# Patient Record
Sex: FEMALE | Race: OTHER | Employment: FULL TIME | URBAN - METROPOLITAN AREA
[De-identification: names, ages, dates, MRNs, and addresses within clinical notes are randomized per-mention and may not be internally consistent; named-entity substitution may affect disease eponyms.]

---

## 2017-01-05 ENCOUNTER — TELEPHONE (OUTPATIENT)
Dept: FAMILY MEDICINE CLINIC | Facility: CLINIC | Age: 52
End: 2017-01-05

## 2017-01-05 RX ORDER — ATORVASTATIN CALCIUM 20 MG/1
20 TABLET, FILM COATED ORAL NIGHTLY
Qty: 30 TABLET | Refills: 2 | Status: SHIPPED | OUTPATIENT
Start: 2017-01-05 | End: 2017-02-13

## 2017-02-13 ENCOUNTER — OFFICE VISIT (OUTPATIENT)
Dept: INTERNAL MEDICINE CLINIC | Facility: CLINIC | Age: 52
End: 2017-02-13

## 2017-02-13 VITALS
BODY MASS INDEX: 28.52 KG/M2 | DIASTOLIC BLOOD PRESSURE: 86 MMHG | HEART RATE: 70 BPM | WEIGHT: 155 LBS | SYSTOLIC BLOOD PRESSURE: 132 MMHG | OXYGEN SATURATION: 98 % | HEIGHT: 62 IN

## 2017-02-13 DIAGNOSIS — E78.2 MIXED HYPERLIPIDEMIA: ICD-10-CM

## 2017-02-13 DIAGNOSIS — Z00.00 ANNUAL PHYSICAL EXAM: Primary | ICD-10-CM

## 2017-02-13 DIAGNOSIS — K21.9 GASTROESOPHAGEAL REFLUX DISEASE, ESOPHAGITIS PRESENCE NOT SPECIFIED: ICD-10-CM

## 2017-02-13 DIAGNOSIS — Z12.39 SCREENING FOR BREAST CANCER: ICD-10-CM

## 2017-02-13 DIAGNOSIS — G43.109 MIGRAINE WITH AURA AND WITHOUT STATUS MIGRAINOSUS, NOT INTRACTABLE: ICD-10-CM

## 2017-02-13 DIAGNOSIS — Z12.11 COLON CANCER SCREENING: ICD-10-CM

## 2017-02-13 DIAGNOSIS — Z78.0 POST-MENOPAUSAL: ICD-10-CM

## 2017-02-13 PROCEDURE — 36415 COLL VENOUS BLD VENIPUNCTURE: CPT | Performed by: FAMILY MEDICINE

## 2017-02-13 PROCEDURE — 80061 LIPID PANEL: CPT | Performed by: FAMILY MEDICINE

## 2017-02-13 PROCEDURE — 99396 PREV VISIT EST AGE 40-64: CPT | Performed by: FAMILY MEDICINE

## 2017-02-13 PROCEDURE — 80053 COMPREHEN METABOLIC PANEL: CPT | Performed by: FAMILY MEDICINE

## 2017-02-13 RX ORDER — ALENDRONATE SODIUM 70 MG/1
70 TABLET ORAL WEEKLY
Qty: 12 TABLET | Refills: 3 | Status: SHIPPED | OUTPATIENT
Start: 2017-02-13 | End: 2018-03-12

## 2017-02-13 RX ORDER — LANSOPRAZOLE 30 MG/1
30 CAPSULE, DELAYED RELEASE ORAL DAILY PRN
Qty: 90 CAPSULE | Refills: 0 | Status: SHIPPED | OUTPATIENT
Start: 2017-02-13 | End: 2018-02-08

## 2017-02-13 RX ORDER — SUMATRIPTAN 100 MG/1
100 TABLET, FILM COATED ORAL EVERY 2 HOUR PRN
Qty: 9 TABLET | Refills: 5 | Status: SHIPPED | OUTPATIENT
Start: 2017-02-13 | End: 2018-02-10

## 2017-02-13 RX ORDER — AMITRIPTYLINE HYDROCHLORIDE 10 MG/1
10 TABLET, FILM COATED ORAL NIGHTLY
Qty: 90 TABLET | Refills: 3 | Status: SHIPPED | OUTPATIENT
Start: 2017-02-13 | End: 2018-01-19

## 2017-02-13 RX ORDER — ATORVASTATIN CALCIUM 20 MG/1
20 TABLET, FILM COATED ORAL NIGHTLY
Qty: 90 TABLET | Refills: 3 | Status: SHIPPED | OUTPATIENT
Start: 2017-02-13 | End: 2017-03-20

## 2017-02-13 NOTE — PROGRESS NOTES
HPI:   Isha Bhatti is a 45year old female who presents for a complete physical exam & BECOMING ESTABLISHED  Last pap:  2015-->NORMAL PER PATIENT  Last mammogram:  1/2016  Menses:  POST MENOPAUSAL X 2 YEARS  Previous colonoscopy:  NONE  Family hx of kaye Children:  Y .       REVIEW OF SYSTEMS:   GENERAL: feels well otherwise  SKIN: denies any unusual skin lesions  EYES:no vision problems--READING GLASSES  LUNGS: denies shortness of breath  CARDIOVASCULAR: denies chest pain  GI: denies abdominal pain,  N Take 1 tablet (20 mg total) by mouth nightly. SUMAtriptan Succinate (IMITREX) 100 MG Oral Tab 9 tablet 5      Sig: Take 1 tablet (100 mg total) by mouth every 2 (two) hours as needed for Migraine.  Use at onset; repeat once after 2 HRS-ONLY 2 IN 24 HR

## 2017-02-18 ENCOUNTER — NURSE ONLY (OUTPATIENT)
Dept: INTERNAL MEDICINE CLINIC | Facility: CLINIC | Age: 52
End: 2017-02-18

## 2017-02-18 DIAGNOSIS — E78.2 MIXED HYPERLIPIDEMIA: Primary | ICD-10-CM

## 2017-02-18 LAB
ALBUMIN SERPL BCP-MCNC: 4.3 G/DL (ref 3.5–4.8)
ALBUMIN/GLOB SERPL: 1.5 {RATIO} (ref 1–2)
ALP SERPL-CCNC: 108 U/L (ref 32–100)
ALT SERPL-CCNC: 19 U/L (ref 14–54)
ANION GAP SERPL CALC-SCNC: 9 MMOL/L (ref 0–18)
AST SERPL-CCNC: 19 U/L (ref 15–41)
BILIRUB SERPL-MCNC: 0.6 MG/DL (ref 0.3–1.2)
BUN SERPL-MCNC: 9 MG/DL (ref 8–20)
BUN/CREAT SERPL: 14.5 (ref 10–20)
CALCIUM SERPL-MCNC: 9.3 MG/DL (ref 8.5–10.5)
CHLORIDE SERPL-SCNC: 104 MMOL/L (ref 95–110)
CHOLEST SERPL-MCNC: 152 MG/DL (ref 110–200)
CO2 SERPL-SCNC: 27 MMOL/L (ref 22–32)
CREAT SERPL-MCNC: 0.62 MG/DL (ref 0.5–1.5)
GLOBULIN PLAS-MCNC: 2.9 G/DL (ref 2.5–3.7)
GLUCOSE SERPL-MCNC: 104 MG/DL (ref 70–99)
HDLC SERPL-MCNC: 51 MG/DL
LDLC SERPL CALC-MCNC: 82 MG/DL (ref 0–99)
NONHDLC SERPL-MCNC: 101 MG/DL
OSMOLALITY UR CALC.SUM OF ELEC: 289 MOSM/KG (ref 275–295)
POTASSIUM SERPL-SCNC: 4.2 MMOL/L (ref 3.3–5.1)
PROT SERPL-MCNC: 7.2 G/DL (ref 5.9–8.4)
SODIUM SERPL-SCNC: 140 MMOL/L (ref 136–144)
TRIGL SERPL-MCNC: 94 MG/DL (ref 1–149)

## 2017-02-18 PROCEDURE — 80053 COMPREHEN METABOLIC PANEL: CPT | Performed by: FAMILY MEDICINE

## 2017-02-18 PROCEDURE — 80061 LIPID PANEL: CPT | Performed by: FAMILY MEDICINE

## 2017-02-18 PROCEDURE — 36415 COLL VENOUS BLD VENIPUNCTURE: CPT | Performed by: FAMILY MEDICINE

## 2017-03-01 ENCOUNTER — HOSPITAL ENCOUNTER (OUTPATIENT)
Dept: MAMMOGRAPHY | Age: 52
Discharge: HOME | End: 2017-03-01
Attending: FAMILY MEDICINE
Payer: COMMERCIAL

## 2017-03-01 DIAGNOSIS — Z12.39 SCREENING FOR BREAST CANCER: ICD-10-CM

## 2017-03-01 PROCEDURE — 77067 SCR MAMMO BI INCL CAD: CPT

## 2017-03-20 ENCOUNTER — TELEPHONE (OUTPATIENT)
Dept: FAMILY MEDICINE CLINIC | Facility: CLINIC | Age: 52
End: 2017-03-20

## 2017-03-20 DIAGNOSIS — E78.2 MIXED HYPERLIPIDEMIA: Primary | ICD-10-CM

## 2017-03-20 RX ORDER — ATORVASTATIN CALCIUM 20 MG/1
20 TABLET, FILM COATED ORAL NIGHTLY
Qty: 90 TABLET | Refills: 1 | Status: SHIPPED | OUTPATIENT
Start: 2017-03-20 | End: 2017-03-22

## 2017-03-22 ENCOUNTER — TELEPHONE (OUTPATIENT)
Dept: FAMILY MEDICINE CLINIC | Facility: CLINIC | Age: 52
End: 2017-03-22

## 2017-03-22 DIAGNOSIS — E78.2 MIXED HYPERLIPIDEMIA: Primary | ICD-10-CM

## 2017-03-22 RX ORDER — ATORVASTATIN CALCIUM 20 MG/1
20 TABLET, FILM COATED ORAL NIGHTLY
Qty: 90 TABLET | Refills: 0 | Status: SHIPPED | OUTPATIENT
Start: 2017-03-22 | End: 2017-10-12

## 2017-05-22 ENCOUNTER — OFFICE VISIT (OUTPATIENT)
Dept: INTERNAL MEDICINE CLINIC | Facility: CLINIC | Age: 52
End: 2017-05-22

## 2017-05-22 VITALS
SYSTOLIC BLOOD PRESSURE: 132 MMHG | OXYGEN SATURATION: 97 % | HEART RATE: 80 BPM | BODY MASS INDEX: 29.08 KG/M2 | WEIGHT: 158 LBS | HEIGHT: 62 IN | TEMPERATURE: 98 F | DIASTOLIC BLOOD PRESSURE: 94 MMHG

## 2017-05-22 DIAGNOSIS — M26.609 TMJ DYSFUNCTION: Primary | ICD-10-CM

## 2017-05-22 PROCEDURE — 99212 OFFICE O/P EST SF 10 MIN: CPT | Performed by: FAMILY MEDICINE

## 2017-05-22 NOTE — PROGRESS NOTES
CC:  Ear Pain      Hx of CC:  3 WEEKS WITH LEFT JAW/EAR PAIN, WORSE WITH CHEWING OR MOUTH OPENING. NO TRAUMA.     Vitals:    05/22/17  1542   BP: 132/94   Pulse: 80   Temp: 98 °F (36.7 °C)   TempSrc: Oral   Height: 62\"   Weight: 158 lb   SpO2: 97%

## 2017-10-12 ENCOUNTER — OFFICE VISIT (OUTPATIENT)
Dept: INTERNAL MEDICINE CLINIC | Facility: CLINIC | Age: 52
End: 2017-10-12

## 2017-10-12 VITALS
BODY MASS INDEX: 29.04 KG/M2 | SYSTOLIC BLOOD PRESSURE: 132 MMHG | WEIGHT: 157.81 LBS | OXYGEN SATURATION: 98 % | DIASTOLIC BLOOD PRESSURE: 80 MMHG | TEMPERATURE: 98 F | HEART RATE: 69 BPM | HEIGHT: 62 IN | RESPIRATION RATE: 18 BRPM

## 2017-10-12 DIAGNOSIS — Z00.00 ANNUAL PHYSICAL EXAM: Primary | ICD-10-CM

## 2017-10-12 DIAGNOSIS — G43.109 MIGRAINE WITH AURA AND WITHOUT STATUS MIGRAINOSUS, NOT INTRACTABLE: ICD-10-CM

## 2017-10-12 DIAGNOSIS — Z23 INFLUENZA VACCINE NEEDED: ICD-10-CM

## 2017-10-12 DIAGNOSIS — H81.12 BENIGN POSITIONAL VERTIGO, LEFT: ICD-10-CM

## 2017-10-12 DIAGNOSIS — Z12.11 SCREENING FOR COLON CANCER: ICD-10-CM

## 2017-10-12 DIAGNOSIS — R73.01 ELEVATED FASTING GLUCOSE: ICD-10-CM

## 2017-10-12 DIAGNOSIS — E78.2 MIXED HYPERLIPIDEMIA: ICD-10-CM

## 2017-10-12 DIAGNOSIS — Z78.0 POST-MENOPAUSAL: ICD-10-CM

## 2017-10-12 PROCEDURE — 99396 PREV VISIT EST AGE 40-64: CPT | Performed by: FAMILY MEDICINE

## 2017-10-12 PROCEDURE — 90471 IMMUNIZATION ADMIN: CPT | Performed by: FAMILY MEDICINE

## 2017-10-12 PROCEDURE — 90686 IIV4 VACC NO PRSV 0.5 ML IM: CPT | Performed by: FAMILY MEDICINE

## 2017-10-12 RX ORDER — ATORVASTATIN CALCIUM 20 MG/1
20 TABLET, FILM COATED ORAL NIGHTLY
Qty: 90 TABLET | Refills: 3 | Status: SHIPPED | OUTPATIENT
Start: 2017-10-12 | End: 2018-10-31

## 2017-10-12 NOTE — PROGRESS NOTES
Pt's  verified  Per Dr Travon Tariq administered FLULAVAL in Pt's Rt arm. Pt tolerated injection well,signed consent sent to scanning.

## 2017-10-12 NOTE — PATIENT INSTRUCTIONS
Vértigo posicional paroxístico lucy  El vértigo posicional paroxístico lucy (“BPPV”, por aisha siglas en inglés) es un problema del oído interno. El oído interno contiene el sistema vestibular. Edith sistema es el que ayuda a mantener conrad equilibrio.  E El proveedor de [employer] preguntará sobre aisha síntomas e historia clínica (antecedentes de [facility]. Es posible que le romy pruebas de audición y equilibrio.  [place] del examen, conrad proveedor de atención médica puede hassan

## 2017-10-13 NOTE — PROGRESS NOTES
HPI:   Milo Logan is a 47year old female who presents for a complete physical exam.   Last pap:  2016 WITH OB/GYN-->NORMAL PER PATIENT  Last mammogram:  3/17 AT [facility]  Previous colonoscopy:  NONE  Family hx of breast, ovarian, cervical or colon CA: Smokeless tobacco: Never Used                      Alcohol use: No              Occ: . :Y . Children:Y .       REVIEW OF SYSTEMS:   GENERAL: feels well otherwise  SKIN: denies any unusual skin lesions  EYES:no vision problems--READING Sig: Take 1 tablet (20 mg total) by mouth nightly.            Imaging & Consults:  FLULAVAL INFLUENZA VACCINE QUAD PRESERVATIVE FREE 0.5 ML  GASTRO - INTERNAL

## 2017-10-21 ENCOUNTER — NURSE ONLY (OUTPATIENT)
Dept: INTERNAL MEDICINE CLINIC | Facility: CLINIC | Age: 52
End: 2017-10-21

## 2017-10-21 DIAGNOSIS — R73.01 ELEVATED FASTING GLUCOSE: ICD-10-CM

## 2017-10-21 DIAGNOSIS — Z00.00 ANNUAL PHYSICAL EXAM: ICD-10-CM

## 2017-10-21 DIAGNOSIS — E78.2 MIXED HYPERLIPIDEMIA: ICD-10-CM

## 2017-10-21 PROCEDURE — 36415 COLL VENOUS BLD VENIPUNCTURE: CPT | Performed by: FAMILY MEDICINE

## 2017-10-21 PROCEDURE — 80061 LIPID PANEL: CPT | Performed by: FAMILY MEDICINE

## 2017-10-21 PROCEDURE — 80053 COMPREHEN METABOLIC PANEL: CPT | Performed by: FAMILY MEDICINE

## 2017-11-16 ENCOUNTER — OFFICE VISIT (OUTPATIENT)
Dept: GASTROENTEROLOGY | Facility: CLINIC | Age: 52
End: 2017-11-16

## 2017-11-16 ENCOUNTER — TELEPHONE (OUTPATIENT)
Dept: GASTROENTEROLOGY | Facility: CLINIC | Age: 52
End: 2017-11-16

## 2017-11-16 VITALS
HEIGHT: 62 IN | SYSTOLIC BLOOD PRESSURE: 122 MMHG | BODY MASS INDEX: 29.01 KG/M2 | WEIGHT: 157.63 LBS | DIASTOLIC BLOOD PRESSURE: 80 MMHG | HEART RATE: 86 BPM

## 2017-11-16 DIAGNOSIS — Z12.12 SCREENING FOR COLORECTAL CANCER: Primary | ICD-10-CM

## 2017-11-16 DIAGNOSIS — K21.9 GASTROESOPHAGEAL REFLUX DISEASE, ESOPHAGITIS PRESENCE NOT SPECIFIED: ICD-10-CM

## 2017-11-16 DIAGNOSIS — Z12.11 SCREENING FOR COLORECTAL CANCER: Primary | ICD-10-CM

## 2017-11-16 PROCEDURE — 99203 OFFICE O/P NEW LOW 30 MIN: CPT | Performed by: INTERNAL MEDICINE

## 2017-11-16 NOTE — H&P
[address] - Gastroenterology                                                                                                  Clinic History and Physical needed for Migraine. Use at onset; repeat once after 2 HRS-ONLY 2 IN 24 HR MAX Disp: 9 tablet Rfl: 5   lansoprazole (PREVACID) 30 MG Oral Capsule Delayed Release Take 1 capsule (30 mg total) by mouth daily as needed (ACID REFLUX).  Disp: 90 capsule Rfl: 0 perforation, as well as the cardiopulmonary risks of anesthesia and all potentially leading to prolonged hospitalization or surgical intervention. I also mentioned the possibility of missed lesion. All questions were answered to the patient’s satisfaction.

## 2017-11-16 NOTE — TELEPHONE ENCOUNTER
Scheduled for:  Colon  Provider Name:   Date:  1-3-18  Location:  [facility]  Sedation:  IV sedation  Time:  Pt aware CF will call with arrival time the day prior to procedure  Prep: Suprep  Meds/Allergies Reconciled?:  yes  Diagnosis with codes:  Screening Z

## 2017-11-16 NOTE — PATIENT INSTRUCTIONS
1. Schedule colonoscopy with Iv/conscious sedation with Dr. Bobbi Johnston    2.  bowel prep from pharmacy ([employer] suprep )    3. Continue all medications for procedure    4. Read all bowel prep instructions carefully    5.  AVOID seeds, nuts, popcorn, raw

## 2018-01-03 ENCOUNTER — LAB REQUISITION (OUTPATIENT)
Dept: LAB | Facility: HOSPITAL | Age: 53
End: 2018-01-03
Payer: COMMERCIAL

## 2018-01-03 DIAGNOSIS — Z01.89 ENCOUNTER FOR OTHER SPECIFIED SPECIAL EXAMINATIONS: ICD-10-CM

## 2018-01-03 PROCEDURE — 88305 TISSUE EXAM BY PATHOLOGIST: CPT | Performed by: INTERNAL MEDICINE

## 2018-01-05 ENCOUNTER — TELEPHONE (OUTPATIENT)
Dept: GASTROENTEROLOGY | Facility: CLINIC | Age: 53
End: 2018-01-05

## 2018-01-05 NOTE — TELEPHONE ENCOUNTER
Juan Purdy MD  [facility] Clinical Staff             GI staff: please place recall for colonoscopy in 3 years     Letter mailed to pt    Recall colonoscopy entered in system for 1/3/2021

## 2018-01-19 DIAGNOSIS — G43.109 MIGRAINE WITH AURA AND WITHOUT STATUS MIGRAINOSUS, NOT INTRACTABLE: ICD-10-CM

## 2018-01-20 RX ORDER — AMITRIPTYLINE HYDROCHLORIDE 10 MG/1
10 TABLET, FILM COATED ORAL NIGHTLY
Qty: 90 TABLET | Refills: 3 | Status: SHIPPED | OUTPATIENT
Start: 2018-01-20 | End: 2019-01-31

## 2018-02-10 DIAGNOSIS — G43.109 MIGRAINE WITH AURA AND WITHOUT STATUS MIGRAINOSUS, NOT INTRACTABLE: ICD-10-CM

## 2018-02-12 RX ORDER — SUMATRIPTAN 100 MG/1
TABLET, FILM COATED ORAL
Qty: 9 TABLET | Refills: 5 | Status: SHIPPED | OUTPATIENT
Start: 2018-02-12 | End: 2019-01-03

## 2018-03-12 DIAGNOSIS — Z78.0 POST-MENOPAUSAL: ICD-10-CM

## 2018-03-12 RX ORDER — ALENDRONATE SODIUM 70 MG/1
70 TABLET ORAL WEEKLY
Qty: 12 TABLET | Refills: 1 | Status: SHIPPED | OUTPATIENT
Start: 2018-03-12 | End: 2018-08-01

## 2018-03-13 ENCOUNTER — OFFICE VISIT (OUTPATIENT)
Dept: INTERNAL MEDICINE CLINIC | Facility: CLINIC | Age: 53
End: 2018-03-13

## 2018-03-13 VITALS
OXYGEN SATURATION: 99 % | WEIGHT: 156 LBS | RESPIRATION RATE: 17 BRPM | SYSTOLIC BLOOD PRESSURE: 130 MMHG | HEIGHT: 62 IN | BODY MASS INDEX: 28.71 KG/M2 | DIASTOLIC BLOOD PRESSURE: 90 MMHG | HEART RATE: 81 BPM

## 2018-03-13 DIAGNOSIS — K62.5 BRIGHT RED RECTAL BLEEDING: ICD-10-CM

## 2018-03-13 DIAGNOSIS — G43.109 MIGRAINE WITH AURA AND WITHOUT STATUS MIGRAINOSUS, NOT INTRACTABLE: Primary | ICD-10-CM

## 2018-03-13 PROCEDURE — 99213 OFFICE O/P EST LOW 20 MIN: CPT | Performed by: FAMILY MEDICINE

## 2018-03-13 RX ORDER — POLYETHYLENE GLYCOL 3350 17 G/17G
17 POWDER, FOR SOLUTION ORAL DAILY
Qty: 100 EACH | Refills: 0 | Status: SHIPPED | OUTPATIENT
Start: 2018-03-13 | End: 2020-01-03 | Stop reason: ALTCHOICE

## 2018-03-13 RX ORDER — TOPIRAMATE 50 MG/1
50 TABLET, FILM COATED ORAL NIGHTLY
Qty: 90 TABLET | Refills: 0 | Status: SHIPPED | OUTPATIENT
Start: 2018-03-13 | End: 2018-06-09

## 2018-03-14 NOTE — PROGRESS NOTES
CC:  Rectal Bleeding (Pt c/o rectal bleeding x 2 wks with BM's-->recently had colonoscopy in January.) and Migraine (Worsening migraines-->Sumatriptan helps but only has 9 day supply per month. )      Hx of CC:  EPISODIC BRB WITH BM'S X 2 WEEKS. PAINLESS.

## 2018-06-09 DIAGNOSIS — G43.109 MIGRAINE WITH AURA AND WITHOUT STATUS MIGRAINOSUS, NOT INTRACTABLE: ICD-10-CM

## 2018-06-11 RX ORDER — TOPIRAMATE 50 MG/1
50 TABLET, FILM COATED ORAL NIGHTLY
Qty: 90 TABLET | Refills: 1 | Status: SHIPPED | OUTPATIENT
Start: 2018-06-11 | End: 2019-01-31

## 2018-08-01 DIAGNOSIS — Z78.0 POST-MENOPAUSAL: ICD-10-CM

## 2018-08-02 RX ORDER — ALENDRONATE SODIUM 70 MG/1
70 TABLET ORAL WEEKLY
Qty: 12 TABLET | Refills: 1 | Status: SHIPPED | OUTPATIENT
Start: 2018-08-02 | End: 2019-02-19

## 2018-09-25 ENCOUNTER — MED REC SCAN ONLY (OUTPATIENT)
Dept: INTERNAL MEDICINE CLINIC | Facility: CLINIC | Age: 53
End: 2018-09-25

## 2018-10-31 DIAGNOSIS — E78.2 MIXED HYPERLIPIDEMIA: ICD-10-CM

## 2018-10-31 RX ORDER — ATORVASTATIN CALCIUM 20 MG/1
TABLET, FILM COATED ORAL
Qty: 90 TABLET | Refills: 1 | Status: SHIPPED | OUTPATIENT
Start: 2018-10-31 | End: 2019-05-04

## 2019-01-03 ENCOUNTER — OFFICE VISIT (OUTPATIENT)
Dept: INTERNAL MEDICINE CLINIC | Facility: CLINIC | Age: 54
End: 2019-01-03
Payer: COMMERCIAL

## 2019-01-03 VITALS
RESPIRATION RATE: 17 BRPM | WEIGHT: 150 LBS | HEART RATE: 63 BPM | OXYGEN SATURATION: 99 % | SYSTOLIC BLOOD PRESSURE: 116 MMHG | DIASTOLIC BLOOD PRESSURE: 82 MMHG | BODY MASS INDEX: 27.6 KG/M2 | HEIGHT: 62 IN

## 2019-01-03 DIAGNOSIS — G43.109 MIGRAINE WITH AURA AND WITHOUT STATUS MIGRAINOSUS, NOT INTRACTABLE: ICD-10-CM

## 2019-01-03 DIAGNOSIS — Z12.39 BREAST CANCER SCREENING: ICD-10-CM

## 2019-01-03 DIAGNOSIS — E78.2 MIXED HYPERLIPIDEMIA: ICD-10-CM

## 2019-01-03 DIAGNOSIS — Z01.419 ENCOUNTER FOR GYNECOLOGICAL EXAMINATION WITHOUT ABNORMAL FINDING: ICD-10-CM

## 2019-01-03 DIAGNOSIS — Z00.00 ANNUAL PHYSICAL EXAM: Primary | ICD-10-CM

## 2019-01-03 PROCEDURE — 99396 PREV VISIT EST AGE 40-64: CPT | Performed by: FAMILY MEDICINE

## 2019-01-03 PROCEDURE — 88175 CYTOPATH C/V AUTO FLUID REDO: CPT | Performed by: FAMILY MEDICINE

## 2019-01-03 RX ORDER — SUMATRIPTAN 100 MG/1
TABLET, FILM COATED ORAL
Qty: 9 TABLET | Refills: 5 | Status: SHIPPED | OUTPATIENT
Start: 2019-01-03 | End: 2019-12-27

## 2019-01-04 LAB
LAST PAP RESULT: NORMAL
PAP HISTORY (OTHER THAN LAST PAP): NORMAL

## 2019-01-04 NOTE — PROGRESS NOTES
HPI:   Jose G Padgett is a 46year old female who presents for a complete physical exam.   Last pap:  3+ YEARS AGO  Last mammogram:  2 YEARS AGO    Previous colonoscopy:  NEVER  Family hx of breast, ovarian, cervical or colon CA:  NONE  Immunizations:  Tda Tobacco Use      Smoking status: Never Smoker      Smokeless tobacco: Never Used    Alcohol use: No      Alcohol/week: 0.0 oz    Drug use: No    Occ: . : Y. Children:Y .       REVIEW OF SYSTEMS:   GENERAL: feels well otherwise  SKIN: denies any unusu MIGRANE, REPEAT ONCE AFTER 2 HOURS, MAX 2 TABS IN 24 HOURS       Imaging & Consults:  [facility] SCREENING BILAT (CPT=77067)

## 2019-01-12 ENCOUNTER — NURSE ONLY (OUTPATIENT)
Dept: INTERNAL MEDICINE CLINIC | Facility: CLINIC | Age: 54
End: 2019-01-12
Payer: COMMERCIAL

## 2019-01-12 DIAGNOSIS — E78.2 MIXED HYPERLIPIDEMIA: ICD-10-CM

## 2019-01-12 DIAGNOSIS — Z00.00 ANNUAL PHYSICAL EXAM: ICD-10-CM

## 2019-01-12 LAB
ALBUMIN SERPL BCP-MCNC: 4 G/DL (ref 3.5–4.8)
ALBUMIN/GLOB SERPL: 1.7 {RATIO} (ref 1–2)
ALP SERPL-CCNC: 76 U/L (ref 32–100)
ALT SERPL-CCNC: 17 U/L (ref 14–54)
ANION GAP SERPL CALC-SCNC: 8 MMOL/L (ref 0–18)
AST SERPL-CCNC: 17 U/L (ref 15–41)
BILIRUB SERPL-MCNC: 0.7 MG/DL (ref 0.3–1.2)
BUN SERPL-MCNC: 14 MG/DL (ref 8–20)
BUN/CREAT SERPL: 21.2 (ref 10–20)
CALCIUM SERPL-MCNC: 9.2 MG/DL (ref 8.5–10.5)
CHLORIDE SERPL-SCNC: 112 MMOL/L (ref 95–110)
CHOLEST SERPL-MCNC: 164 MG/DL (ref 110–200)
CO2 SERPL-SCNC: 24 MMOL/L (ref 22–32)
CREAT SERPL-MCNC: 0.66 MG/DL (ref 0.5–1.5)
GLOBULIN PLAS-MCNC: 2.4 G/DL (ref 2.5–3.7)
GLUCOSE SERPL-MCNC: 81 MG/DL (ref 70–99)
HDLC SERPL-MCNC: 63 MG/DL
LDLC SERPL CALC-MCNC: 81 MG/DL (ref 0–99)
NONHDLC SERPL-MCNC: 101 MG/DL
OSMOLALITY UR CALC.SUM OF ELEC: 298 MOSM/KG (ref 275–295)
PATIENT FASTING: YES
POTASSIUM SERPL-SCNC: 3.4 MMOL/L (ref 3.3–5.1)
PROT SERPL-MCNC: 6.4 G/DL (ref 5.9–8.4)
SODIUM SERPL-SCNC: 144 MMOL/L (ref 136–144)
TRIGL SERPL-MCNC: 99 MG/DL (ref 1–149)

## 2019-01-12 PROCEDURE — 36415 COLL VENOUS BLD VENIPUNCTURE: CPT | Performed by: FAMILY MEDICINE

## 2019-01-12 PROCEDURE — 80053 COMPREHEN METABOLIC PANEL: CPT | Performed by: FAMILY MEDICINE

## 2019-01-12 PROCEDURE — 80061 LIPID PANEL: CPT | Performed by: FAMILY MEDICINE

## 2019-01-31 DIAGNOSIS — G43.109 MIGRAINE WITH AURA AND WITHOUT STATUS MIGRAINOSUS, NOT INTRACTABLE: ICD-10-CM

## 2019-01-31 RX ORDER — TOPIRAMATE 50 MG/1
50 TABLET, FILM COATED ORAL NIGHTLY
Qty: 90 TABLET | Refills: 3 | Status: SHIPPED | OUTPATIENT
Start: 2019-01-31 | End: 2020-01-03

## 2019-01-31 RX ORDER — AMITRIPTYLINE HYDROCHLORIDE 10 MG/1
10 TABLET, FILM COATED ORAL NIGHTLY
Qty: 90 TABLET | Refills: 3 | Status: SHIPPED | OUTPATIENT
Start: 2019-01-31 | End: 2019-12-27

## 2019-02-02 ENCOUNTER — HOSPITAL ENCOUNTER (OUTPATIENT)
Dept: MAMMOGRAPHY | Age: 54
Discharge: HOME | End: 2019-02-02
Attending: FAMILY MEDICINE
Payer: COMMERCIAL

## 2019-02-02 DIAGNOSIS — Z12.39 BREAST CANCER SCREENING: ICD-10-CM

## 2019-02-02 PROCEDURE — 77067 SCR MAMMO BI INCL CAD: CPT | Performed by: FAMILY MEDICINE

## 2019-02-02 PROCEDURE — 77063 BREAST TOMOSYNTHESIS BI: CPT | Performed by: FAMILY MEDICINE

## 2019-02-19 DIAGNOSIS — Z78.0 POST-MENOPAUSAL: ICD-10-CM

## 2019-02-19 RX ORDER — ALENDRONATE SODIUM 70 MG/1
70 TABLET ORAL WEEKLY
Qty: 12 TABLET | Refills: 1 | Status: SHIPPED | OUTPATIENT
Start: 2019-02-19 | End: 2019-09-16

## 2019-02-19 NOTE — TELEPHONE ENCOUNTER
Last OV 1/3/19. No future appt scheduled.       Osteoporosis Medication Protocol Failed2/19 12:52 AM   DEXA scan within past 2 years

## 2019-05-04 DIAGNOSIS — E78.2 MIXED HYPERLIPIDEMIA: ICD-10-CM

## 2019-05-04 RX ORDER — ATORVASTATIN CALCIUM 20 MG/1
TABLET, FILM COATED ORAL
Qty: 90 TABLET | Refills: 1 | Status: SHIPPED | OUTPATIENT
Start: 2019-05-04 | End: 2019-10-18

## 2019-09-16 DIAGNOSIS — Z78.0 POST-MENOPAUSAL: ICD-10-CM

## 2019-09-16 RX ORDER — ALENDRONATE SODIUM 70 MG/1
70 TABLET ORAL WEEKLY
Qty: 4 TABLET | Refills: 5 | Status: SHIPPED | OUTPATIENT
Start: 2019-09-16 | End: 2020-01-03

## 2019-10-18 DIAGNOSIS — E78.2 MIXED HYPERLIPIDEMIA: ICD-10-CM

## 2019-10-18 RX ORDER — ATORVASTATIN CALCIUM 20 MG/1
TABLET, FILM COATED ORAL
Qty: 30 TABLET | Refills: 5 | Status: SHIPPED | OUTPATIENT
Start: 2019-10-18 | End: 2020-01-03

## 2019-11-22 ENCOUNTER — OFFICE VISIT (OUTPATIENT)
Dept: INTERNAL MEDICINE CLINIC | Facility: CLINIC | Age: 54
End: 2019-11-22
Payer: OTHER MISCELLANEOUS

## 2019-11-22 VITALS
DIASTOLIC BLOOD PRESSURE: 82 MMHG | HEIGHT: 62 IN | SYSTOLIC BLOOD PRESSURE: 120 MMHG | OXYGEN SATURATION: 99 % | WEIGHT: 155 LBS | HEART RATE: 76 BPM | BODY MASS INDEX: 28.52 KG/M2

## 2019-11-22 DIAGNOSIS — S73.191A: ICD-10-CM

## 2019-11-22 DIAGNOSIS — G47.19 EXCESSIVE DAYTIME SLEEPINESS: ICD-10-CM

## 2019-11-22 DIAGNOSIS — J39.2 NARROW PHARYNGEAL AIRWAY: ICD-10-CM

## 2019-11-22 DIAGNOSIS — R51.9 MORNING HEADACHE: ICD-10-CM
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
## 2019-11-22 PROCEDURE — 99214 OFFICE O/P EST MOD 30 MIN: CPT | Performed by: FAMILY MEDICINE

## 2019-11-22 RX ORDER — MELOXICAM 15 MG/1
15 TABLET ORAL
Qty: 30 TABLET | Refills: 0 | Status: SHIPPED | OUTPATIENT
Start: 2019-11-22 | End: 2019-12-27

## 2019-11-22 RX ORDER — HYDROCODONE BITARTRATE AND ACETAMINOPHEN 5; 325 MG/1; MG/1
TABLET ORAL
Refills: 0 | COMMUNITY
Start: 2019-11-12 | End: 2020-01-03 | Stop reason: ALTCHOICE

## 2019-11-22 NOTE — PROGRESS NOTES
CC:  Back Pain (Pt presents to clinic for f/up on back pain post fall @ work on 11/12/19.)      Hx of CC:  SLIPPED AND FELL ON SLIPPERY PAVEMENT AT WORK WITH RIGHT BUTTOCK PAIN SINCE-->ABOUT 50% BETTER NOW    Had xr's at [facility] on 11/ [facility] REFERRAL HOME SLEEP APNEA TEST  - GENERAL SLEEP STUDY TRANSCRIPTION; Future    5. Narrow pharyngeal airway  SEE ABOVE  - [facility] REFERRAL HOME SLEEP APNEA TEST  - GENERAL SLEEP STUDY TRANSCRIPTION;  Future     [facility] [address] APN

## 2019-12-10 ENCOUNTER — TELEPHONE (OUTPATIENT)
Dept: INTERNAL MEDICINE CLINIC | Facility: CLINIC | Age: 54
End: 2019-12-10

## 2019-12-10 NOTE — TELEPHONE ENCOUNTER
Not A Covered Benefit Sleep Study     Patient made appt for January. Either cancel or pay out of pocket.

## 2019-12-27 DIAGNOSIS — G43.109 MIGRAINE WITH AURA AND WITHOUT STATUS MIGRAINOSUS, NOT INTRACTABLE: ICD-10-CM

## 2019-12-30 RX ORDER — SUMATRIPTAN 100 MG/1
TABLET, FILM COATED ORAL
Qty: 9 TABLET | Refills: 5 | Status: SHIPPED | OUTPATIENT
Start: 2019-12-30 | End: 2020-10-19

## 2019-12-30 RX ORDER — AMITRIPTYLINE HYDROCHLORIDE 10 MG/1
10 TABLET, FILM COATED ORAL NIGHTLY
Qty: 90 TABLET | Refills: 3 | Status: SHIPPED | OUTPATIENT
Start: 2019-12-30 | End: 2020-01-03 | Stop reason: ALTCHOICE

## 2019-12-30 RX ORDER — MELOXICAM 15 MG/1
15 TABLET ORAL
Qty: 30 TABLET | Refills: 0 | Status: SHIPPED | OUTPATIENT
Start: 2019-12-30 | End: 2021-01-08 | Stop reason: ALTCHOICE

## 2020-01-03 ENCOUNTER — OFFICE VISIT (OUTPATIENT)
Dept: INTERNAL MEDICINE CLINIC | Facility: CLINIC | Age: 55
End: 2020-01-03
Payer: COMMERCIAL

## 2020-01-03 VITALS
HEIGHT: 62 IN | DIASTOLIC BLOOD PRESSURE: 80 MMHG | BODY MASS INDEX: 28.59 KG/M2 | HEART RATE: 99 BPM | SYSTOLIC BLOOD PRESSURE: 124 MMHG | TEMPERATURE: 98 F | RESPIRATION RATE: 17 BRPM | WEIGHT: 155.38 LBS | OXYGEN SATURATION: 99 %

## 2020-01-03 DIAGNOSIS — Z78.0 POST-MENOPAUSAL: ICD-10-CM

## 2020-01-03 DIAGNOSIS — G43.109 MIGRAINE WITH AURA AND WITHOUT STATUS MIGRAINOSUS, NOT INTRACTABLE: ICD-10-CM

## 2020-01-03 DIAGNOSIS — Z00.00 ANNUAL PHYSICAL EXAM: Primary | ICD-10-CM

## 2020-01-03 DIAGNOSIS — I83.93 VARICOSE VEINS OF BOTH LOWER EXTREMITIES, UNSPECIFIED WHETHER COMPLICATED: ICD-10-CM

## 2020-01-03 DIAGNOSIS — E78.2 MIXED HYPERLIPIDEMIA: ICD-10-CM

## 2020-01-03 DIAGNOSIS — Z12.31 BREAST CANCER SCREENING BY MAMMOGRAM: ICD-10-CM

## 2020-01-03 LAB
ALBUMIN SERPL-MCNC: 3.9 G/DL (ref 3.4–5)
ALBUMIN/GLOB SERPL: 1.3 {RATIO} (ref 1–2)
ALP LIVER SERPL-CCNC: 87 U/L (ref 41–108)
ALT SERPL-CCNC: 22 U/L (ref 13–56)
ANION GAP SERPL CALC-SCNC: 4 MMOL/L (ref 0–18)
AST SERPL-CCNC: 7 U/L (ref 15–37)
BILIRUB SERPL-MCNC: 0.3 MG/DL (ref 0.1–2)
BUN BLD-MCNC: 17 MG/DL (ref 7–18)
BUN/CREAT SERPL: 21.5 (ref 10–20)
CALCIUM BLD-MCNC: 9.6 MG/DL (ref 8.5–10.1)
CHLORIDE SERPL-SCNC: 112 MMOL/L (ref 98–112)
CHOLEST SMN-MCNC: 212 MG/DL (ref ?–200)
CO2 SERPL-SCNC: 28 MMOL/L (ref 21–32)
CREAT BLD-MCNC: 0.79 MG/DL (ref 0.55–1.02)
DEPRECATED RDW RBC AUTO: 43.8 FL (ref 35.1–46.3)
ERYTHROCYTE [DISTWIDTH] IN BLOOD BY AUTOMATED COUNT: 12.7 % (ref 11–15)
GLOBULIN PLAS-MCNC: 3.1 G/DL (ref 2.8–4.4)
GLUCOSE BLD-MCNC: 82 MG/DL (ref 70–99)
HCT VFR BLD AUTO: 44.8 % (ref 35–48)
HDLC SERPL-MCNC: 65 MG/DL (ref 40–59)
HGB BLD-MCNC: 13.9 G/DL (ref 12–16)
LDLC SERPL CALC-MCNC: 113 MG/DL (ref ?–100)
M PROTEIN MFR SERPL ELPH: 7 G/DL (ref 6.4–8.2)
MCH RBC QN AUTO: 28.8 PG (ref 26–34)
MCHC RBC AUTO-ENTMCNC: 31 G/DL (ref 31–37)
MCV RBC AUTO: 92.9 FL (ref 80–100)
NONHDLC SERPL-MCNC: 147 MG/DL (ref ?–130)
OSMOLALITY SERPL CALC.SUM OF ELEC: 299 MOSM/KG (ref 275–295)
PATIENT FASTING Y/N/NP: YES
PATIENT FASTING Y/N/NP: YES
PLATELET # BLD AUTO: 322 10(3)UL (ref 150–450)
POTASSIUM SERPL-SCNC: 3.8 MMOL/L (ref 3.5–5.1)
RBC # BLD AUTO: 4.82 X10(6)UL (ref 3.8–5.3)
SODIUM SERPL-SCNC: 144 MMOL/L (ref 136–145)
T4 FREE SERPL-MCNC: 1.1 NG/DL (ref 0.8–1.7)
TRIGL SERPL-MCNC: 170 MG/DL (ref 30–149)
TSI SER-ACNC: 5.62 MIU/ML (ref 0.36–3.74)
VLDLC SERPL CALC-MCNC: 34 MG/DL (ref 0–30)
WBC # BLD AUTO: 7 X10(3) UL (ref 4–11)

## 2020-01-03 PROCEDURE — 99396 PREV VISIT EST AGE 40-64: CPT | Performed by: FAMILY MEDICINE

## 2020-01-03 PROCEDURE — 80061 LIPID PANEL: CPT | Performed by: FAMILY MEDICINE

## 2020-01-03 PROCEDURE — 80050 GENERAL HEALTH PANEL: CPT | Performed by: FAMILY MEDICINE

## 2020-01-03 PROCEDURE — 84439 ASSAY OF FREE THYROXINE: CPT | Performed by: FAMILY MEDICINE

## 2020-01-03 RX ORDER — ALENDRONATE SODIUM 70 MG/1
70 TABLET ORAL WEEKLY
Qty: 12 TABLET | Refills: 3 | Status: SHIPPED | OUTPATIENT
Start: 2020-01-03 | End: 2021-01-16

## 2020-01-03 RX ORDER — ATORVASTATIN CALCIUM 20 MG/1
TABLET, FILM COATED ORAL
Qty: 90 TABLET | Refills: 3 | Status: SHIPPED | OUTPATIENT
Start: 2020-01-03 | End: 2021-01-18

## 2020-01-03 RX ORDER — TOPIRAMATE 50 MG/1
50 TABLET, FILM COATED ORAL NIGHTLY
Qty: 90 TABLET | Refills: 3 | Status: SHIPPED | OUTPATIENT
Start: 2020-01-03 | End: 2021-01-08

## 2020-01-03 NOTE — PROGRESS NOTES
HPI:   Alivia Wall is a 53year old female who presents for a complete physical exam.   Last pap:  2019  Last mammogram:  1/2019  Previous colonoscopy:  2017 WITH POLYPECTOMY--DUE ON 2022  Family hx of breast, ovarian, cervical or colon CA:  Uriah Palacios Social History:   Social History    Tobacco Use      Smoking status: Never Smoker      Smokeless tobacco: Never Used    Alcohol use: No      Alcohol/week: 0.0 standard drinks    Drug use: No    Occ:  FACTORY WORK--STANDING ALL DAY . :Y .  Children: by mouth nightly. • atorvastatin 20 MG Oral Tab 90 tablet 3     Sig: TAKE 1 TABLET BY MOUTH AT BEDTIME   • alendronate 70 MG Oral Tab 12 tablet 3     Sig: Take 1 tablet (70 mg total) by mouth once a week.        Imaging & Consults:  DME - EXTERNAL   MARISOL S

## 2020-02-01 ENCOUNTER — HOSPITAL ENCOUNTER (OUTPATIENT)
Dept: MAMMOGRAPHY | Age: 55
Discharge: HOME | End: 2020-02-01
Attending: FAMILY MEDICINE
Payer: COMMERCIAL

## 2020-02-01 DIAGNOSIS — Z12.31 BREAST CANCER SCREENING BY MAMMOGRAM: ICD-10-CM

## 2020-02-01 PROCEDURE — 77063 BREAST TOMOSYNTHESIS BI: CPT | Performed by: FAMILY MEDICINE

## 2020-02-01 PROCEDURE — 77067 SCR MAMMO BI INCL CAD: CPT | Performed by: FAMILY MEDICINE

## 2020-08-23 DIAGNOSIS — G43.109 MIGRAINE WITH AURA AND WITHOUT STATUS MIGRAINOSUS, NOT INTRACTABLE: ICD-10-CM

## 2020-08-24 RX ORDER — SUMATRIPTAN 100 MG/1
TABLET, FILM COATED ORAL
Qty: 9 TABLET | Refills: 5 | OUTPATIENT
Start: 2020-08-24

## 2020-10-17 DIAGNOSIS — G43.109 MIGRAINE WITH AURA AND WITHOUT STATUS MIGRAINOSUS, NOT INTRACTABLE: ICD-10-CM

## 2020-10-19 RX ORDER — SUMATRIPTAN 100 MG/1
TABLET, FILM COATED ORAL
Qty: 9 TABLET | Refills: 2 | Status: SHIPPED | OUTPATIENT
Start: 2020-10-19 | End: 2021-01-16

## 2020-12-03 ENCOUNTER — TELEPHONE (OUTPATIENT)
Dept: GASTROENTEROLOGY | Facility: CLINIC | Age: 55
End: 2020-12-03

## 2020-12-03 NOTE — TELEPHONE ENCOUNTER
----- Message from Bakari Hughes, [address] sent at 11/5/2018  2:29 PM CST -----  Regarding: Recall Colon  Per TE 1/5/18 - recall colon for 3 years per MG.  Colon done 1/3/18

## 2021-01-08 ENCOUNTER — OFFICE VISIT (OUTPATIENT)
Dept: FAMILY MEDICINE CLINIC | Facility: CLINIC | Age: 56
End: 2021-01-08
Payer: COMMERCIAL

## 2021-01-08 VITALS
BODY MASS INDEX: 28.33 KG/M2 | HEIGHT: 61.5 IN | DIASTOLIC BLOOD PRESSURE: 64 MMHG | OXYGEN SATURATION: 99 % | WEIGHT: 152 LBS | HEART RATE: 68 BPM | SYSTOLIC BLOOD PRESSURE: 126 MMHG

## 2021-01-08 DIAGNOSIS — R06.83 SNORING: ICD-10-CM

## 2021-01-08 DIAGNOSIS — R51.9 NONINTRACTABLE HEADACHE, UNSPECIFIED CHRONICITY PATTERN, UNSPECIFIED HEADACHE TYPE: ICD-10-CM

## 2021-01-08 DIAGNOSIS — M81.0 OSTEOPOROSIS, UNSPECIFIED OSTEOPOROSIS TYPE, UNSPECIFIED PATHOLOGICAL FRACTURE PRESENCE: ICD-10-CM

## 2021-01-08 DIAGNOSIS — Z12.31 SCREENING MAMMOGRAM, ENCOUNTER FOR: ICD-10-CM

## 2021-01-08 DIAGNOSIS — IMO0002 CHRONIC MIGRAINE: Primary | ICD-10-CM

## 2021-01-08 DIAGNOSIS — Z11.59 NEED FOR HEPATITIS C SCREENING TEST: ICD-10-CM

## 2021-01-08 DIAGNOSIS — Z00.00 PREVENTATIVE HEALTH CARE: ICD-10-CM

## 2021-01-08 DIAGNOSIS — Z12.11 SCREENING FOR COLON CANCER: ICD-10-CM

## 2021-01-08 LAB
ALBUMIN SERPL-MCNC: 3.9 G/DL (ref 3.4–5)
ALBUMIN/GLOB SERPL: 1.1 {RATIO} (ref 1–2)
ALP LIVER SERPL-CCNC: 92 U/L
ALT SERPL-CCNC: 29 U/L
ANION GAP SERPL CALC-SCNC: 6 MMOL/L (ref 0–18)
AST SERPL-CCNC: 16 U/L (ref 15–37)
BASOPHILS # BLD AUTO: 0.03 X10(3) UL (ref 0–0.2)
BASOPHILS NFR BLD AUTO: 0.5 %
BILIRUB SERPL-MCNC: 0.5 MG/DL (ref 0.1–2)
BUN BLD-MCNC: 17 MG/DL (ref 7–18)
BUN/CREAT SERPL: 25 (ref 10–20)
CALCIUM BLD-MCNC: 9.2 MG/DL (ref 8.5–10.1)
CHLORIDE SERPL-SCNC: 112 MMOL/L (ref 98–112)
CHOLEST SMN-MCNC: 175 MG/DL (ref ?–200)
CO2 SERPL-SCNC: 26 MMOL/L (ref 21–32)
CREAT BLD-MCNC: 0.68 MG/DL
DEPRECATED RDW RBC AUTO: 44.7 FL (ref 35.1–46.3)
EOSINOPHIL # BLD AUTO: 0.17 X10(3) UL (ref 0–0.7)
EOSINOPHIL NFR BLD AUTO: 2.6 %
ERYTHROCYTE [DISTWIDTH] IN BLOOD BY AUTOMATED COUNT: 13.2 % (ref 11–15)
GLOBULIN PLAS-MCNC: 3.4 G/DL (ref 2.8–4.4)
GLUCOSE BLD-MCNC: 85 MG/DL (ref 70–99)
HCT VFR BLD AUTO: 42.2 %
HCV AB SERPL QL IA: NONREACTIVE
HDLC SERPL-MCNC: 75 MG/DL (ref 40–59)
HGB BLD-MCNC: 13.6 G/DL
IMM GRANULOCYTES # BLD AUTO: 0.01 X10(3) UL (ref 0–1)
IMM GRANULOCYTES NFR BLD: 0.2 %
LDLC SERPL CALC-MCNC: 78 MG/DL (ref ?–100)
LYMPHOCYTES # BLD AUTO: 2.38 X10(3) UL (ref 1–4)
LYMPHOCYTES NFR BLD AUTO: 36.7 %
M PROTEIN MFR SERPL ELPH: 7.3 G/DL (ref 6.4–8.2)
MCH RBC QN AUTO: 29.7 PG (ref 26–34)
MCHC RBC AUTO-ENTMCNC: 32.2 G/DL (ref 31–37)
MCV RBC AUTO: 92.1 FL
MONOCYTES # BLD AUTO: 0.41 X10(3) UL (ref 0.1–1)
MONOCYTES NFR BLD AUTO: 6.3 %
NEUTROPHILS # BLD AUTO: 3.48 X10 (3) UL (ref 1.5–7.7)
NEUTROPHILS # BLD AUTO: 3.48 X10(3) UL (ref 1.5–7.7)
NEUTROPHILS NFR BLD AUTO: 53.7 %
NONHDLC SERPL-MCNC: 100 MG/DL (ref ?–130)
OSMOLALITY SERPL CALC.SUM OF ELEC: 299 MOSM/KG (ref 275–295)
PATIENT FASTING Y/N/NP: YES
PATIENT FASTING Y/N/NP: YES
PLATELET # BLD AUTO: 305 10(3)UL (ref 150–450)
POTASSIUM SERPL-SCNC: 3.7 MMOL/L (ref 3.5–5.1)
RBC # BLD AUTO: 4.58 X10(6)UL
SODIUM SERPL-SCNC: 144 MMOL/L (ref 136–145)
TRIGL SERPL-MCNC: 108 MG/DL (ref 30–149)
VLDLC SERPL CALC-MCNC: 22 MG/DL (ref 0–30)
WBC # BLD AUTO: 6.5 X10(3) UL (ref 4–11)

## 2021-01-08 PROCEDURE — 99205 OFFICE O/P NEW HI 60 MIN: CPT | Performed by: INTERNAL MEDICINE

## 2021-01-08 PROCEDURE — 3008F BODY MASS INDEX DOCD: CPT | Performed by: INTERNAL MEDICINE

## 2021-01-08 PROCEDURE — 85025 COMPLETE CBC W/AUTO DIFF WBC: CPT | Performed by: INTERNAL MEDICINE

## 2021-01-08 PROCEDURE — 3074F SYST BP LT 130 MM HG: CPT | Performed by: INTERNAL MEDICINE

## 2021-01-08 PROCEDURE — 80053 COMPREHEN METABOLIC PANEL: CPT | Performed by: INTERNAL MEDICINE

## 2021-01-08 PROCEDURE — 36415 COLL VENOUS BLD VENIPUNCTURE: CPT | Performed by: INTERNAL MEDICINE

## 2021-01-08 PROCEDURE — 3078F DIAST BP <80 MM HG: CPT | Performed by: INTERNAL MEDICINE

## 2021-01-08 PROCEDURE — 86803 HEPATITIS C AB TEST: CPT | Performed by: INTERNAL MEDICINE

## 2021-01-08 PROCEDURE — 80061 LIPID PANEL: CPT | Performed by: INTERNAL MEDICINE

## 2021-01-08 RX ORDER — VENLAFAXINE 37.5 MG/1
37.5 TABLET ORAL NIGHTLY
Qty: 30 TABLET | Refills: 0 | Status: SHIPPED | OUTPATIENT
Start: 2021-01-08

## 2021-01-08 RX ORDER — NICOTINE POLACRILEX 4 MG/1
1 GUM, CHEWING ORAL DAILY
Qty: 30 TABLET | Refills: 1 | Status: SHIPPED | OUTPATIENT
Start: 2021-01-08 | End: 2021-01-25

## 2021-01-12 ENCOUNTER — TELEPHONE (OUTPATIENT)
Dept: GASTROENTEROLOGY | Facility: CLINIC | Age: 56
End: 2021-01-12

## 2021-01-12 DIAGNOSIS — Z86.010 HISTORY OF ADENOMATOUS POLYP OF COLON: Primary | ICD-10-CM

## 2021-01-12 NOTE — TELEPHONE ENCOUNTER
# [id number]  [address] [place] [address]. Please transfer to triage.     Last Procedure:  Colonoscopy 01/3/2018  Last Diagnosis:  Colon polyps  Recalled for (months or years): 3 years  Sedation used previously:  Versed and fentanyl  Last Prep Used (if known):  suprep  Q

## 2021-01-12 NOTE — TELEPHONE ENCOUNTER
Patient called in with [address] [facility]  to schedule the procedure. She does have a letter. Please follow up ( patient needs a ).  Please follow up

## 2021-01-15 ENCOUNTER — TELEPHONE (OUTPATIENT)
Dept: FAMILY MEDICINE CLINIC | Facility: CLINIC | Age: 56
End: 2021-01-15

## 2021-01-15 NOTE — TELEPHONE ENCOUNTER
Results were discussed with patient, she was also notified that her Mri was approved.     ----- Message from Demetri Kim MD sent at 1/15/2021  9:16 AM CST -----  Please inform her that all of her labs returned with no significant abnormality.

## 2021-01-16 DIAGNOSIS — Z78.0 POST-MENOPAUSAL: ICD-10-CM

## 2021-01-16 DIAGNOSIS — G43.109 MIGRAINE WITH AURA AND WITHOUT STATUS MIGRAINOSUS, NOT INTRACTABLE: ICD-10-CM

## 2021-01-16 DIAGNOSIS — E78.2 MIXED HYPERLIPIDEMIA: ICD-10-CM

## 2021-01-18 RX ORDER — ALENDRONATE SODIUM 70 MG/1
TABLET ORAL
Qty: 4 TABLET | Refills: 11 | OUTPATIENT
Start: 2021-01-18

## 2021-01-18 RX ORDER — ATORVASTATIN CALCIUM 20 MG/1
TABLET, FILM COATED ORAL
Qty: 90 TABLET | Refills: 1 | Status: SHIPPED | OUTPATIENT
Start: 2021-01-18 | End: 2021-08-17

## 2021-01-18 NOTE — TELEPHONE ENCOUNTER
Patient is calling because she needs the following medications refilled under her new PCP (Dr. Ellis De) and not Dr. Travon Tariq. The following medications are: atorvastatin 20 MG Oral Tab, alendronate 70 MG Oral Tab, SUMAtriptan Succinate 100 MG Oral Tab.  Addie

## 2021-01-18 NOTE — TELEPHONE ENCOUNTER
# [id number]    [facility]SFER TO TRI[facility]. NEED TO SPEAK WITH PATIENT WITH .  Gabriela Ordaz

## 2021-01-18 NOTE — TELEPHONE ENCOUNTER
Pt. Is returning the surgery sched call. I tried to reach but not avail., Pt. States that she is only avail. , btwn. 3-5pm. Every day.

## 2021-01-20 NOTE — TELEPHONE ENCOUNTER
Spoke with patient via language line . Screening questions answered below. Please advise on orders.     Last Procedure, Date, MD:  1/3/18, Eliza Catalan  Last Diagnosis:  polyps  Recalled for (mth/yrs): 3 years  Sedation used previously:  Fentanyl/

## 2021-01-23 DIAGNOSIS — Z78.0 POST-MENOPAUSAL: ICD-10-CM

## 2021-01-23 RX ORDER — ATORVASTATIN CALCIUM 20 MG/1
TABLET, FILM COATED ORAL
Qty: 90 TABLET | Refills: 3 | OUTPATIENT
Start: 2021-01-23

## 2021-01-23 RX ORDER — SUMATRIPTAN 100 MG/1
TABLET, FILM COATED ORAL
Qty: 9 TABLET | Refills: 2 | Status: SHIPPED | OUTPATIENT
Start: 2021-01-23 | End: 2021-04-08

## 2021-01-23 RX ORDER — ALENDRONATE SODIUM 70 MG/1
70 TABLET ORAL WEEKLY
Qty: 12 TABLET | Refills: 0 | Status: SHIPPED | OUTPATIENT
Start: 2021-01-23 | End: 2021-02-16

## 2021-01-23 NOTE — TELEPHONE ENCOUNTER
Please schedule patient for colonoscopy with MAC at [facility], [address] [facility], or the hospital    Diagnosis history of adenomatous colon polyps    Prep: suprep    Thanks    Yani Currie MD  [facility] - Gastroenterology  1/23/2021  9:11 AM

## 2021-01-23 NOTE — PROGRESS NOTES
[facility]  New Patient History and Physical      HPI:   Patient presents with:  Physical: yearly exam  Sleep Problem      Columbus Rubinstein is a 63year old female presenting for: establishment of care.     Has  has a past medical histo 92.1 80.0 - 100.0 fL    MCH 29.7 26.0 - 34.0 pg    MCHC 32.2 31.0 - 37.0 g/dL    RDW-SD 44.7 35.1 - 46.3 fL    RDW 13.2 11.0 - 15.0 %    .0 150.0 - 450.0 10(3)uL    Neutrophil Absolute Prelim 3.48 1.50 - 7.70 x10 (3) uL    Neutrophil Absolute 3.48 1 Dispense Refill   • Venlafaxine HCl 37.5 MG Oral Tab Take 1 tablet (37.5 mg total) by mouth nightly. 30 tablet 0   • Omeprazole 20 MG Oral Tab EC Take 1 tablet by mouth daily.  30 tablet 1   • SUMAtriptan Succinate 100 MG Oral Tab TAKE 1 TABLET BY MOUTH AT Negative for rash and wound. Allergic/Immunologic: Negative for food allergies and immunocompromised state. Neurological: Positive for headaches.  Negative for dizziness, seizures, facial asymmetry, speech difficulty, weakness, light-headedness and numb motion. She exhibits no tenderness. Lymphadenopathy:     She has no cervical adenopathy. Neurological: She is alert and oriented to person, place, and time. She has normal reflexes. No cranial nerve deficit. Skin: Skin is warm. No rash noted.  No eryt mouth daily.        Orders Placed This Encounter      CBC W Differential W Platelet [E]      Comp Metabolic Panel (14) [E]      Lipid Panel [E]      HCV Antibody [E]      Imaging & Consults:  PULMONARY - INTERNAL  GASTRO - INTERNAL  MRI BRAIN (CPT=70551)  M

## 2021-01-25 ENCOUNTER — TELEPHONE (OUTPATIENT)
Dept: FAMILY MEDICINE CLINIC | Facility: CLINIC | Age: 56
End: 2021-01-25

## 2021-01-25 RX ORDER — OMEPRAZOLE 20 MG/1
20 CAPSULE, DELAYED RELEASE ORAL DAILY
Qty: 30 CAPSULE | Refills: 1 | Status: SHIPPED | OUTPATIENT
Start: 2021-01-25 | End: 2021-03-25

## 2021-01-25 RX ORDER — ALENDRONATE SODIUM 70 MG/1
70 TABLET ORAL WEEKLY
Qty: 12 TABLET | Refills: 0 | OUTPATIENT
Start: 2021-01-25

## 2021-01-25 NOTE — TELEPHONE ENCOUNTER
A refill was authorized for a 90 day supply on 01/23/2021, she is to have a dexa scan before more refill can be authorized.

## 2021-01-25 NOTE — TELEPHONE ENCOUNTER
Omeprazole tablets not covered per fax sent by pharmacy. Will cover capsules. To please send alternative. Capsules e-rx to pharmacy.

## 2021-02-03 RX ORDER — VENLAFAXINE 37.5 MG/1
37.5 TABLET ORAL NIGHTLY
Qty: 30 TABLET | Refills: 0 | OUTPATIENT
Start: 2021-02-03

## 2021-02-09 NOTE — TELEPHONE ENCOUNTER
Patient returned call, please call with  [phone], or preferred to call daughter/Martha that speaks Samuel Matt at:[phone],thanks.

## 2021-02-10 RX ORDER — SODIUM, POTASSIUM,MAG SULFATES 17.5-3.13G
SOLUTION, RECONSTITUTED, ORAL ORAL
Qty: 1 BOTTLE | Refills: 0 | Status: ON HOLD | OUTPATIENT
Start: 2021-02-10 | End: 2021-03-23

## 2021-02-10 NOTE — TELEPHONE ENCOUNTER
Dr. Amanda Salcedo--    This patient is scheduled. Please send her Suprep to the pharmacy on file.  Thank you    You may close this TE when done :)

## 2021-02-10 NOTE — TELEPHONE ENCOUNTER
Scheduled for:  Colonoscopy 13827  Provider Name:  Dr. Fazal Esposito  Date:  3/23/21  Location:  [facility]    Sedation:  MAC  Time:  2746 (pt is aware to arrive at 1330)   Prep:  Suprep, sent Malaysian instructions via VHT  Meds/Allergies Reconciled?:  Physician

## 2021-02-12 ENCOUNTER — HOSPITAL ENCOUNTER (OUTPATIENT)
Dept: MRI IMAGING | Age: 56
Discharge: HOME | End: 2021-02-12
Attending: INTERNAL MEDICINE
Payer: COMMERCIAL

## 2021-02-12 DIAGNOSIS — IMO0002 CHRONIC MIGRAINE: ICD-10-CM

## 2021-02-12 PROCEDURE — 70551 MRI BRAIN STEM W/O DYE: CPT | Performed by: INTERNAL MEDICINE

## 2021-02-13 ENCOUNTER — HOSPITAL ENCOUNTER (OUTPATIENT)
Dept: BONE DENSITY | Age: 56
Discharge: HOME | End: 2021-02-13
Attending: INTERNAL MEDICINE
Payer: COMMERCIAL

## 2021-02-13 ENCOUNTER — HOSPITAL ENCOUNTER (OUTPATIENT)
Dept: MAMMOGRAPHY | Age: 56
Discharge: HOME | End: 2021-02-13
Attending: INTERNAL MEDICINE
Payer: COMMERCIAL

## 2021-02-13 DIAGNOSIS — Z12.31 SCREENING MAMMOGRAM, ENCOUNTER FOR: ICD-10-CM

## 2021-02-13 DIAGNOSIS — M81.0 OSTEOPOROSIS, UNSPECIFIED OSTEOPOROSIS TYPE, UNSPECIFIED PATHOLOGICAL FRACTURE PRESENCE: ICD-10-CM

## 2021-02-13 PROCEDURE — 77063 BREAST TOMOSYNTHESIS BI: CPT | Performed by: INTERNAL MEDICINE

## 2021-02-13 PROCEDURE — 77080 DXA BONE DENSITY AXIAL: CPT | Performed by: INTERNAL MEDICINE

## 2021-02-13 PROCEDURE — 77067 SCR MAMMO BI INCL CAD: CPT | Performed by: INTERNAL MEDICINE

## 2021-02-16 ENCOUNTER — TELEPHONE (OUTPATIENT)
Dept: FAMILY MEDICINE CLINIC | Facility: CLINIC | Age: 56
End: 2021-02-16

## 2021-02-16 DIAGNOSIS — Z78.0 POST-MENOPAUSAL: ICD-10-CM

## 2021-02-16 RX ORDER — ALENDRONATE SODIUM 70 MG/1
70 TABLET ORAL WEEKLY
Qty: 12 TABLET | Refills: 2 | Status: SHIPPED | OUTPATIENT
Start: 2021-02-16 | End: 2021-12-07

## 2021-02-16 NOTE — TELEPHONE ENCOUNTER
Please inform her that her MRI brain was reviewed. No actue intracranial abnormality. Pineal cyst unchanged sin eNov 2007. This does not explain her underlying symptoms. Mammogram with no sign findings. Recommendation for yearly screening.

## 2021-02-16 NOTE — TELEPHONE ENCOUNTER
Results were discussed with patient, she is aware of all of them and was looking to get a refill on her Alendronate that will be needing to fill in April, Rx sent in.  Valentina Sanchez was wondering about Venlafaxine, she is wondering about her side effects, nausea a

## 2021-03-08 NOTE — TELEPHONE ENCOUNTER
The diagnoses are history of colon polyp and colorectal cancer screening    I don't think typically a procedure like this its not covered at all its simply that there is difference on copayments/deductibles/insurance depending on how the patient's insuranc

## 2021-03-08 NOTE — TELEPHONE ENCOUNTER
I called [employer] - [phone] and spoke with Ash Black @9:57am to see if PA is needed. He stated that if CLN is for diagnostic - then pt's plan does not cover it. If for screening CLN, then No PA needed. Please advise.

## 2021-03-09 NOTE — TELEPHONE ENCOUNTER
Call conducted with Trudi Serrano  ID [id number], [address] [facility].   Left message to call back to discuss insurance coverage.

## 2021-03-09 NOTE — TELEPHONE ENCOUNTER
After reviewing pt's benefits, she has an unusual plan where services that are not preventative are not covered. Please let patient know that the procedure may not be fully covered.

## 2021-03-12 NOTE — TELEPHONE ENCOUNTER
[facility]   [place] [id number]. Left message to call back.

## 2021-03-13 DIAGNOSIS — Z23 NEED FOR VACCINATION: ICD-10-CM

## 2021-03-16 ENCOUNTER — TELEPHONE (OUTPATIENT)
Dept: GASTROENTEROLOGY | Facility: CLINIC | Age: 56
End: 2021-03-16

## 2021-03-16 NOTE — TELEPHONE ENCOUNTER
Pt states that she received a message from this office about a problem with her insurance for 3/23/21 procedure. See 1/12/21 closed TE. Please call.

## 2021-03-18 NOTE — PAT NURSING NOTE
I spoke toreviewed time and location of the procedure at [facility] with the patient.

## 2021-03-19 NOTE — TELEPHONE ENCOUNTER
[address] # [id number]    I gave the pt the CPT code and dx code for her procedure so she can call her insurance and be informed how much she may have to pay out of pocket.     She wants to proceed and she will set up payment plan for what

## 2021-03-20 ENCOUNTER — LAB ENCOUNTER (OUTPATIENT)
Dept: LAB | Age: 56
End: 2021-03-20
Attending: INTERNAL MEDICINE
Payer: COMMERCIAL

## 2021-03-20 DIAGNOSIS — Z01.818 PRE-OP TESTING: ICD-10-CM

## 2021-03-21 LAB — SARS-COV-2 RNA RESP QL NAA+PROBE: NOT DETECTED

## 2021-03-23 ENCOUNTER — HOSPITAL ENCOUNTER (OUTPATIENT)
Age: 56
Setting detail: HOSPITAL OUTPATIENT SURGERY
Discharge: HOME | End: 2021-03-23
Attending: INTERNAL MEDICINE | Admitting: INTERNAL MEDICINE
Payer: COMMERCIAL

## 2021-03-23 ENCOUNTER — ANESTHESIA EVENT (OUTPATIENT)
Dept: ENDOSCOPY | Age: 56
End: 2021-03-23
Payer: COMMERCIAL

## 2021-03-23 ENCOUNTER — ANESTHESIA (OUTPATIENT)
Dept: ENDOSCOPY | Age: 56
End: 2021-03-23
Payer: COMMERCIAL

## 2021-03-23 VITALS
BODY MASS INDEX: 28.7 KG/M2 | HEIGHT: 61 IN | RESPIRATION RATE: 19 BRPM | DIASTOLIC BLOOD PRESSURE: 62 MMHG | WEIGHT: 152 LBS | OXYGEN SATURATION: 100 % | TEMPERATURE: 98 F | SYSTOLIC BLOOD PRESSURE: 115 MMHG | HEART RATE: 60 BPM

## 2021-03-23 DIAGNOSIS — Z01.818 PRE-OP TESTING: Primary | ICD-10-CM

## 2021-03-23 DIAGNOSIS — Z86.010 HISTORY OF ADENOMATOUS POLYP OF COLON: ICD-10-CM

## 2021-03-23 PROCEDURE — 99070 SPECIAL SUPPLIES PHYS/QHP: CPT | Performed by: INTERNAL MEDICINE

## 2021-03-23 PROCEDURE — 45380 COLONOSCOPY AND BIOPSY: CPT | Performed by: INTERNAL MEDICINE

## 2021-03-23 PROCEDURE — 45385 COLONOSCOPY W/LESION REMOVAL: CPT | Performed by: INTERNAL MEDICINE

## 2021-03-23 PROCEDURE — 88305 TISSUE EXAM BY PATHOLOGIST: CPT | Performed by: INTERNAL MEDICINE

## 2021-03-23 RX ORDER — SODIUM CHLORIDE, SODIUM LACTATE, POTASSIUM CHLORIDE, CALCIUM CHLORIDE 600; 310; 30; 20 MG/100ML; MG/100ML; MG/100ML; MG/100ML
INJECTION, SOLUTION INTRAVENOUS CONTINUOUS
Status: DISCONTINUED | OUTPATIENT
Start: 2021-03-23 | End: 2021-03-23

## 2021-03-23 RX ORDER — LIDOCAINE HYDROCHLORIDE 10 MG/ML
INJECTION, SOLUTION EPIDURAL; INFILTRATION; INTRACAUDAL; PERINEURAL AS NEEDED
Status: DISCONTINUED | OUTPATIENT
Start: 2021-03-23 | End: 2021-03-23 | Stop reason: SURG

## 2021-03-23 RX ORDER — SODIUM CHLORIDE, SODIUM LACTATE, POTASSIUM CHLORIDE, CALCIUM CHLORIDE 600; 310; 30; 20 MG/100ML; MG/100ML; MG/100ML; MG/100ML
INJECTION, SOLUTION INTRAVENOUS CONTINUOUS
Status: CANCELLED | OUTPATIENT
Start: 2021-03-23

## 2021-03-23 RX ORDER — NALOXONE HYDROCHLORIDE 0.4 MG/ML
80 INJECTION, SOLUTION INTRAMUSCULAR; INTRAVENOUS; SUBCUTANEOUS AS NEEDED
Status: CANCELLED | OUTPATIENT
Start: 2021-03-23 | End: 2021-03-23

## 2021-03-23 RX ADMIN — SODIUM CHLORIDE, SODIUM LACTATE, POTASSIUM CHLORIDE, CALCIUM CHLORIDE: 600; 310; 30; 20 INJECTION, SOLUTION INTRAVENOUS at 14:49:00

## 2021-03-23 RX ADMIN — LIDOCAINE HYDROCHLORIDE 50 MG: 10 INJECTION, SOLUTION EPIDURAL; INFILTRATION; INTRACAUDAL; PERINEURAL at 14:31:00

## 2021-03-23 RX ADMIN — SODIUM CHLORIDE, SODIUM LACTATE, POTASSIUM CHLORIDE, CALCIUM CHLORIDE: 600; 310; 30; 20 INJECTION, SOLUTION INTRAVENOUS at 14:29:00

## 2021-03-23 NOTE — OPERATIVE REPORT
Colonoscopy Report    Donn Palomo     1965 Age 63year old   PCP An Durham MD Endoscopist Yousuf Sanderson MD     Date of procedure: 21    Procedure: Colonoscopy w/ biopsy and snare polypectomy    Pre-operative diagnosis: history monitored throughout the procedure and remained stable. Estimated blood loss: insignificant    Specimens collected:  Colon polyps    Complications: none     Colonoscopy findings:    Cecum: there was a 4-5 mm polyp removed by cold snare polypectomy.  Othe

## 2021-03-23 NOTE — ANESTHESIA PREPROCEDURE EVALUATION
Anesthesia PreOp Note    HPI:     Gerhardt Guile is a 63year old female who presents for preoperative consultation requested by: Yenifer Delarosa MD    Date of Surgery: 3/23/2021    Procedure(s):  COLONOSCOPY  Indication: History of adenomatous polyp o 3/18/2021  Na Sulfate-K Sulfate-Mg Sulf (SUPREP BOWEL PREP KIT) 17.5-3.13-1.6 GM/177ML Oral Solution, Take as directed, Disp: 1 Bottle, Rfl: 0  hydrocortisone 2.5 % Rectal Cream, Place 1 Application rectally 2 (two) times daily. , Disp: 28.35 g, Rfl: 1, prn Clubs or Organizations:       Attends Club or Organization Meetings:       Marital Status:   Intimate Partner Violence:       Fear of Current or Ex-Partner:       Emotionally Abused:       Physically Abused:       Sexually Abused:     Available pre-op labs  via language line of the nature of the anesthetic plan, benefits, risks including possible dental damage if relevant, major complications, and any alternative forms of anesthetic management.    All of the patient's questions were answered to the

## 2021-03-23 NOTE — H&P
History & Physical Examination    Patient Name: Brock Lara  MRN: [id number]  CSN: [id number]  YOB: 1965    Diagnosis: history of adenomatous colon polyps    alendronate 70 MG Oral Tab, Take 1 tablet (70 mg total) by mouth once a week., Leopold Martens the History and Physical done within the last 30 days. Any changes noted above.   Medina Rosa MD  [facility] - Gastroenterology  3/23/2021  2:29 PM

## 2021-03-23 NOTE — ANESTHESIA POSTPROCEDURE EVALUATION
Patient: Eulogio Dacosta    Procedure Summary     Date: 03/23/21 Room / Location: [facility] / [address] [facility] ENDO    Anesthesia Start: 5609 Anesthesia Stop: 9248    Procedure: COLONOSCOPY (N/A ) Diagnosis:       History of adenomatous polyp of colon      (p

## 2021-03-25 RX ORDER — OMEPRAZOLE 20 MG/1
CAPSULE, DELAYED RELEASE ORAL
Qty: 30 CAPSULE | Refills: 1 | Status: SHIPPED | OUTPATIENT
Start: 2021-03-25 | End: 2021-05-25

## 2021-03-26 ENCOUNTER — TELEPHONE (OUTPATIENT)
Dept: GASTROENTEROLOGY | Facility: CLINIC | Age: 56
End: 2021-03-26

## 2021-03-26 NOTE — TELEPHONE ENCOUNTER
I mailed out colonoscopy results letter (Libyan) to pt  Updated health maintenance  Entered into 5 yr CLN recall  Recall colon in 5 years per.  Colon done 3/23/2021    Therese Arauz MD  [facility] Clinical Staff  GI staff: please place recall for colono

## 2021-03-31 ENCOUNTER — TELEPHONE (OUTPATIENT)
Dept: FAMILY MEDICINE CLINIC | Facility: CLINIC | Age: 56
End: 2021-03-31

## 2021-03-31 NOTE — TELEPHONE ENCOUNTER
Patient is calling stating she still continues with bad migraines. She states the medication prescribed are not working and only received 9 tablets and finished the extra refills.      Patient wants to know if there is something strong she can be prescribed

## 2021-04-08 ENCOUNTER — OFFICE VISIT (OUTPATIENT)
Dept: FAMILY MEDICINE CLINIC | Facility: CLINIC | Age: 56
End: 2021-04-08
Payer: COMMERCIAL

## 2021-04-08 VITALS
SYSTOLIC BLOOD PRESSURE: 144 MMHG | OXYGEN SATURATION: 99 % | DIASTOLIC BLOOD PRESSURE: 70 MMHG | WEIGHT: 156 LBS | HEART RATE: 65 BPM | BODY MASS INDEX: 29 KG/M2

## 2021-04-08 DIAGNOSIS — G43.109 MIGRAINE WITH AURA AND WITHOUT STATUS MIGRAINOSUS, NOT INTRACTABLE: ICD-10-CM

## 2021-04-08 DIAGNOSIS — G43.809 OTHER MIGRAINE WITHOUT STATUS MIGRAINOSUS, NOT INTRACTABLE: Primary | ICD-10-CM

## 2021-04-08 PROCEDURE — 3077F SYST BP >= 140 MM HG: CPT | Performed by: INTERNAL MEDICINE

## 2021-04-08 PROCEDURE — 99214 OFFICE O/P EST MOD 30 MIN: CPT | Performed by: INTERNAL MEDICINE

## 2021-04-08 PROCEDURE — 3078F DIAST BP <80 MM HG: CPT | Performed by: INTERNAL MEDICINE

## 2021-04-08 RX ORDER — SUMATRIPTAN 100 MG/1
TABLET, FILM COATED ORAL
Qty: 9 TABLET | Refills: 2 | Status: SHIPPED | OUTPATIENT
Start: 2021-04-08 | End: 2021-12-07

## 2021-04-08 RX ORDER — BUTALBITAL, ACETAMINOPHEN AND CAFFEINE 300; 40; 50 MG/1; MG/1; MG/1
1 CAPSULE ORAL 3 TIMES DAILY PRN
Qty: 60 CAPSULE | Refills: 0 | Status: SHIPPED | OUTPATIENT
Start: 2021-04-08

## 2021-04-23 NOTE — PROGRESS NOTES
[facility]  New Patient History and Physical      HPI:   Patient presents with:  Headache      Louisa Bee is a 63year old female presenting for: establishment of care.     Has  has a past medical history of Colon adenomas (01/03/ A1.    Specimen B is submitted in formalin labeled “Livier, ascending colon polyp” and consists of two fragments of pink-tan soft tissue measuring in aggregate 0.3 x 0.2 x 0.2 cm.  The specimen is submitted entirely in cassette B1. (jq)    Keyon Messer M.D./ 30 capsule 1   • alendronate 70 MG Oral Tab Take 1 tablet (70 mg total) by mouth once a week.  12 tablet 2   • atorvastatin 20 MG Oral Tab TAKE 1 TABLET BY MOUTH EVERYDAY AT BEDTIME 90 tablet 1   • Venlafaxine HCl 37.5 MG Oral Tab Take 1 tablet (37.5 mg tot Positive for headaches. Negative for dizziness, seizures, facial asymmetry, speech difficulty, weakness, light-headedness and numbness. Hematological: Negative for adenopathy. Does not bruise/bleed easily.    Psychiatric/Behavioral: Negative for suicidal cranial nerve deficit. Skin: Skin is warm. No rash noted. No erythema. Psychiatric: She has a normal mood and affect.  Her behavior is normal. Judgment and thought content normal.           ASSESSMENT AND PLAN:   Patient is a 63year old female who pres answered. Notified to call with any questions, complications, allergies, or worsening or changing symptoms as well as any side effects or complications from the treatments . Red flags/ ER precautions discussed.       Mercedez Lancaster MD  Internal Medicine/

## 2021-05-25 RX ORDER — OMEPRAZOLE 20 MG/1
CAPSULE, DELAYED RELEASE ORAL
Qty: 30 CAPSULE | Refills: 1 | Status: SHIPPED | OUTPATIENT
Start: 2021-05-25

## 2021-08-15 DIAGNOSIS — E78.2 MIXED HYPERLIPIDEMIA: ICD-10-CM

## 2021-08-17 RX ORDER — ATORVASTATIN CALCIUM 20 MG/1
TABLET, FILM COATED ORAL
Qty: 90 TABLET | Refills: 0 | Status: SHIPPED | OUTPATIENT
Start: 2021-08-17 | End: 2021-11-15

## 2021-11-05 DIAGNOSIS — G43.109 MIGRAINE WITH AURA AND WITHOUT STATUS MIGRAINOSUS, NOT INTRACTABLE: ICD-10-CM

## 2021-11-05 RX ORDER — SUMATRIPTAN 100 MG/1
TABLET, FILM COATED ORAL
Qty: 9 TABLET | Refills: 2 | OUTPATIENT
Start: 2021-11-05

## 2021-11-10 DIAGNOSIS — E78.2 MIXED HYPERLIPIDEMIA: ICD-10-CM

## 2021-11-15 RX ORDER — ATORVASTATIN CALCIUM 20 MG/1
TABLET, FILM COATED ORAL
Qty: 30 TABLET | Refills: 2 | Status: SHIPPED | OUTPATIENT
Start: 2021-11-15

## 2021-12-03 DIAGNOSIS — Z78.0 POST-MENOPAUSAL: ICD-10-CM

## 2021-12-05 DIAGNOSIS — G43.109 MIGRAINE WITH AURA AND WITHOUT STATUS MIGRAINOSUS, NOT INTRACTABLE: ICD-10-CM

## 2021-12-07 RX ORDER — ALENDRONATE SODIUM 70 MG/1
70 TABLET ORAL WEEKLY
Qty: 12 TABLET | Refills: 0 | Status: SHIPPED | OUTPATIENT
Start: 2021-12-07 | End: 2022-03-16

## 2021-12-07 RX ORDER — SUMATRIPTAN 100 MG/1
TABLET, FILM COATED ORAL
Qty: 9 TABLET | Refills: 0 | Status: SHIPPED | OUTPATIENT
Start: 2021-12-07 | End: 2022-02-02

## 2022-01-08 DIAGNOSIS — G43.109 MIGRAINE WITH AURA AND WITHOUT STATUS MIGRAINOSUS, NOT INTRACTABLE: ICD-10-CM

## 2022-01-08 RX ORDER — SUMATRIPTAN 100 MG/1
TABLET, FILM COATED ORAL
Qty: 9 TABLET | Refills: 0 | OUTPATIENT
Start: 2022-01-08

## 2022-02-02 RX ORDER — SUMATRIPTAN 100 MG/1
TABLET, FILM COATED ORAL
Qty: 4 TABLET | Refills: 0 | Status: SHIPPED | OUTPATIENT
Start: 2022-02-02 | End: 2022-03-16

## 2022-02-02 NOTE — TELEPHONE ENCOUNTER
Pt called requesting refills for SUMAtriptan Succinate 100 MG Oral Tab  Pt is aware that she needs to be seen but is unable to come because she has a really bad debt due to having tests that where not covered by insurance and pt was never told  Pt wants to prevent getting charge more and just wants to get refills for this    Pt wants to be informed if doctor will be able to refill her prescription

## 2022-02-12 DIAGNOSIS — E78.2 MIXED HYPERLIPIDEMIA: ICD-10-CM

## 2022-02-15 RX ORDER — ATORVASTATIN CALCIUM 20 MG/1
TABLET, FILM COATED ORAL
Qty: 30 TABLET | Refills: 0 | Status: SHIPPED | OUTPATIENT
Start: 2022-02-15 | End: 2022-03-16

## 2022-02-20 DIAGNOSIS — Z78.0 POST-MENOPAUSAL: ICD-10-CM

## 2022-02-21 RX ORDER — ALENDRONATE SODIUM 70 MG/1
70 TABLET ORAL WEEKLY
Qty: 4 TABLET | Refills: 2 | OUTPATIENT
Start: 2022-02-21

## 2022-03-09 DIAGNOSIS — E78.2 MIXED HYPERLIPIDEMIA: ICD-10-CM

## 2022-03-09 RX ORDER — ATORVASTATIN CALCIUM 20 MG/1
TABLET, FILM COATED ORAL
Qty: 30 TABLET | Refills: 0 | OUTPATIENT
Start: 2022-03-09

## 2022-03-16 ENCOUNTER — OFFICE VISIT (OUTPATIENT)
Dept: FAMILY MEDICINE CLINIC | Facility: CLINIC | Age: 57
End: 2022-03-16
Payer: COMMERCIAL

## 2022-03-16 VITALS
HEIGHT: 61 IN | WEIGHT: 152 LBS | HEART RATE: 69 BPM | OXYGEN SATURATION: 99 % | TEMPERATURE: 98 F | BODY MASS INDEX: 28.7 KG/M2 | DIASTOLIC BLOOD PRESSURE: 90 MMHG | SYSTOLIC BLOOD PRESSURE: 150 MMHG

## 2022-03-16 DIAGNOSIS — E78.2 MIXED HYPERLIPIDEMIA: ICD-10-CM

## 2022-03-16 DIAGNOSIS — Z12.4 PAP SMEAR FOR CERVICAL CANCER SCREENING: ICD-10-CM

## 2022-03-16 DIAGNOSIS — Z00.00 PREVENTATIVE HEALTH CARE: ICD-10-CM

## 2022-03-16 DIAGNOSIS — Z12.31 SCREENING MAMMOGRAM FOR BREAST CANCER: ICD-10-CM

## 2022-03-16 DIAGNOSIS — M81.0 OSTEOPOROSIS, UNSPECIFIED OSTEOPOROSIS TYPE, UNSPECIFIED PATHOLOGICAL FRACTURE PRESENCE: ICD-10-CM

## 2022-03-16 DIAGNOSIS — G43.109 MIGRAINE WITH AURA AND WITHOUT STATUS MIGRAINOSUS, NOT INTRACTABLE: ICD-10-CM

## 2022-03-16 DIAGNOSIS — Z00.00 ANNUAL PHYSICAL EXAM: Primary | ICD-10-CM

## 2022-03-16 DIAGNOSIS — E03.8 SUBCLINICAL HYPOTHYROIDISM: ICD-10-CM

## 2022-03-16 PROCEDURE — 3008F BODY MASS INDEX DOCD: CPT | Performed by: INTERNAL MEDICINE

## 2022-03-16 PROCEDURE — 3080F DIAST BP >= 90 MM HG: CPT | Performed by: INTERNAL MEDICINE

## 2022-03-16 PROCEDURE — 3077F SYST BP >= 140 MM HG: CPT | Performed by: INTERNAL MEDICINE

## 2022-03-16 PROCEDURE — 99396 PREV VISIT EST AGE 40-64: CPT | Performed by: INTERNAL MEDICINE

## 2022-03-16 RX ORDER — ATORVASTATIN CALCIUM 20 MG/1
TABLET, FILM COATED ORAL
Qty: 30 TABLET | Refills: 0 | Status: SHIPPED | OUTPATIENT
Start: 2022-03-16

## 2022-03-16 RX ORDER — ALENDRONATE SODIUM 70 MG/1
70 TABLET ORAL WEEKLY
Qty: 12 TABLET | Refills: 0 | Status: SHIPPED | OUTPATIENT
Start: 2022-03-16

## 2022-03-16 RX ORDER — OMEPRAZOLE 20 MG/1
20 CAPSULE, DELAYED RELEASE ORAL DAILY
Qty: 90 CAPSULE | Refills: 1 | Status: SHIPPED | OUTPATIENT
Start: 2022-03-16

## 2022-03-16 RX ORDER — SUMATRIPTAN 100 MG/1
TABLET, FILM COATED ORAL
Qty: 4 TABLET | Refills: 0 | Status: SHIPPED | OUTPATIENT
Start: 2022-03-16

## 2022-04-11 RX ORDER — ATORVASTATIN CALCIUM 20 MG/1
TABLET, FILM COATED ORAL
Qty: 30 TABLET | Refills: 0 | Status: SHIPPED | OUTPATIENT
Start: 2022-04-11

## 2022-04-20 DIAGNOSIS — G43.109 MIGRAINE WITH AURA AND WITHOUT STATUS MIGRAINOSUS, NOT INTRACTABLE: ICD-10-CM

## 2022-04-21 RX ORDER — SUMATRIPTAN 100 MG/1
TABLET, FILM COATED ORAL
Qty: 10 TABLET | Refills: 0 | Status: SHIPPED | OUTPATIENT
Start: 2022-04-21 | End: 2022-05-17

## 2022-05-10 DIAGNOSIS — E78.2 MIXED HYPERLIPIDEMIA: ICD-10-CM

## 2022-05-11 RX ORDER — ATORVASTATIN CALCIUM 20 MG/1
TABLET, FILM COATED ORAL
Qty: 30 TABLET | Refills: 0 | OUTPATIENT
Start: 2022-05-11

## 2022-05-16 DIAGNOSIS — G43.109 MIGRAINE WITH AURA AND WITHOUT STATUS MIGRAINOSUS, NOT INTRACTABLE: ICD-10-CM

## 2022-05-17 RX ORDER — SUMATRIPTAN 100 MG/1
TABLET, FILM COATED ORAL
Qty: 10 TABLET | Refills: 0 | Status: SHIPPED | OUTPATIENT
Start: 2022-05-17

## 2022-05-28 DIAGNOSIS — E78.2 MIXED HYPERLIPIDEMIA: ICD-10-CM

## 2022-05-31 RX ORDER — ATORVASTATIN CALCIUM 20 MG/1
TABLET, FILM COATED ORAL
Qty: 30 TABLET | Refills: 0 | OUTPATIENT
Start: 2022-05-31

## 2022-06-07 DIAGNOSIS — M81.0 OSTEOPOROSIS, UNSPECIFIED OSTEOPOROSIS TYPE, UNSPECIFIED PATHOLOGICAL FRACTURE PRESENCE: ICD-10-CM

## 2022-06-07 RX ORDER — ALENDRONATE SODIUM 70 MG/1
70 TABLET ORAL WEEKLY
Qty: 4 TABLET | Refills: 2 | OUTPATIENT
Start: 2022-06-07

## 2022-07-01 DIAGNOSIS — G43.109 MIGRAINE WITH AURA AND WITHOUT STATUS MIGRAINOSUS, NOT INTRACTABLE: ICD-10-CM

## 2022-07-01 RX ORDER — SUMATRIPTAN 100 MG/1
TABLET, FILM COATED ORAL
Qty: 10 TABLET | Refills: 0 | Status: SHIPPED | OUTPATIENT
Start: 2022-07-01

## 2022-07-03 DIAGNOSIS — M81.0 OSTEOPOROSIS, UNSPECIFIED OSTEOPOROSIS TYPE, UNSPECIFIED PATHOLOGICAL FRACTURE PRESENCE: ICD-10-CM

## 2022-07-05 RX ORDER — ALENDRONATE SODIUM 70 MG/1
70 TABLET ORAL WEEKLY
Qty: 4 TABLET | Refills: 2 | Status: SHIPPED
Start: 2022-07-05

## 2022-12-03 ENCOUNTER — HOSPITAL ENCOUNTER (OUTPATIENT)
Facility: HOSPITAL | Age: 57
Setting detail: OBSERVATION
Discharge: HOME | End: 2022-12-05
Attending: EMERGENCY MEDICINE | Admitting: HOSPITALIST
Payer: COMMERCIAL

## 2022-12-03 DIAGNOSIS — K25.3 ACUTE GASTRIC ULCER, UNSPECIFIED WHETHER GASTRIC ULCER HEMORRHAGE OR PERFORATION PRESENT: Primary | ICD-10-CM

## 2022-12-03 RX ORDER — MECLIZINE HYDROCHLORIDE 25 MG/1
25 TABLET ORAL 3 TIMES DAILY PRN
COMMUNITY

## 2022-12-04 ENCOUNTER — ANESTHESIA EVENT (OUTPATIENT)
Dept: ENDOSCOPY | Facility: HOSPITAL | Age: 57
End: 2022-12-04
Payer: COMMERCIAL

## 2022-12-04 ENCOUNTER — APPOINTMENT (OUTPATIENT)
Dept: ULTRASOUND IMAGING | Facility: HOSPITAL | Age: 57
End: 2022-12-04
Attending: EMERGENCY MEDICINE
Payer: COMMERCIAL

## 2022-12-04 ENCOUNTER — APPOINTMENT (OUTPATIENT)
Dept: CT IMAGING | Facility: HOSPITAL | Age: 57
End: 2022-12-04
Attending: EMERGENCY MEDICINE
Payer: COMMERCIAL

## 2022-12-04 PROBLEM — K25.3 ACUTE GASTRIC ULCER, UNSPECIFIED WHETHER GASTRIC ULCER HEMORRHAGE OR PERFORATION PRESENT: Status: ACTIVE | Noted: 2022-12-04

## 2022-12-04 LAB
ALBUMIN SERPL-MCNC: 4.1 G/DL (ref 3.4–5)
ALP LIVER SERPL-CCNC: 103 U/L
ALT SERPL-CCNC: 25 U/L
ANION GAP SERPL CALC-SCNC: 5 MMOL/L (ref 0–18)
AST SERPL-CCNC: 13 U/L (ref 15–37)
BASOPHILS # BLD AUTO: 0.05 X10(3) UL (ref 0–0.2)
BASOPHILS NFR BLD AUTO: 0.5 %
BILIRUB DIRECT SERPL-MCNC: <0.1 MG/DL (ref 0–0.2)
BILIRUB SERPL-MCNC: 0.3 MG/DL (ref 0.1–2)
BILIRUB UR QL: NEGATIVE
BUN BLD-MCNC: 16 MG/DL (ref 7–18)
BUN/CREAT SERPL: 25.4 (ref 10–20)
CALCIUM BLD-MCNC: 9.2 MG/DL (ref 8.5–10.1)
CHLORIDE SERPL-SCNC: 107 MMOL/L (ref 98–112)
CLARITY UR: CLEAR
CO2 SERPL-SCNC: 29 MMOL/L (ref 21–32)
COLOR UR: YELLOW
CREAT BLD-MCNC: 0.63 MG/DL
DEPRECATED RDW RBC AUTO: 45 FL (ref 35.1–46.3)
EOSINOPHIL # BLD AUTO: 0.38 X10(3) UL (ref 0–0.7)
EOSINOPHIL NFR BLD AUTO: 4.2 %
ERYTHROCYTE [DISTWIDTH] IN BLOOD BY AUTOMATED COUNT: 13.2 % (ref 11–15)
GFR SERPLBLD BASED ON 1.73 SQ M-ARVRAT: 103 ML/MIN/1.73M2 (ref 60–?)
GLUCOSE BLD-MCNC: 111 MG/DL (ref 70–99)
GLUCOSE UR-MCNC: NEGATIVE MG/DL
HCT VFR BLD AUTO: 41.8 %
HGB BLD-MCNC: 13.3 G/DL
IMM GRANULOCYTES # BLD AUTO: 0.02 X10(3) UL (ref 0–1)
IMM GRANULOCYTES NFR BLD: 0.2 %
KETONES UR-MCNC: NEGATIVE MG/DL
LEUKOCYTE ESTERASE UR QL STRIP.AUTO: NEGATIVE
LIPASE SERPL-CCNC: 163 U/L (ref 73–393)
LYMPHOCYTES # BLD AUTO: 2.83 X10(3) UL (ref 1–4)
LYMPHOCYTES NFR BLD AUTO: 31 %
MCH RBC QN AUTO: 29.2 PG (ref 26–34)
MCHC RBC AUTO-ENTMCNC: 31.8 G/DL (ref 31–37)
MCV RBC AUTO: 91.9 FL
MONOCYTES # BLD AUTO: 0.59 X10(3) UL (ref 0.1–1)
MONOCYTES NFR BLD AUTO: 6.5 %
NEUTROPHILS # BLD AUTO: 5.25 X10 (3) UL (ref 1.5–7.7)
NEUTROPHILS # BLD AUTO: 5.25 X10(3) UL (ref 1.5–7.7)
NEUTROPHILS NFR BLD AUTO: 57.6 %
NITRITE UR QL STRIP.AUTO: NEGATIVE
OSMOLALITY SERPL CALC.SUM OF ELEC: 294 MOSM/KG (ref 275–295)
PH UR: 6 [PH] (ref 5–8)
PLATELET # BLD AUTO: 362 10(3)UL (ref 150–450)
POTASSIUM SERPL-SCNC: 3.5 MMOL/L (ref 3.5–5.1)
PROT SERPL-MCNC: 7.1 G/DL (ref 6.4–8.2)
PROT UR-MCNC: NEGATIVE MG/DL
RBC # BLD AUTO: 4.55 X10(6)UL
SARS-COV-2 RNA RESP QL NAA+PROBE: NOT DETECTED
SODIUM SERPL-SCNC: 141 MMOL/L (ref 136–145)
SP GR UR STRIP: 1 (ref 1–1.03)
UROBILINOGEN UR STRIP-ACNC: <2
VIT C UR-MCNC: NEGATIVE MG/DL
WBC # BLD AUTO: 9.1 X10(3) UL (ref 4–11)

## 2022-12-04 PROCEDURE — 99220 INITIAL OBSERVATION CARE,LEVL III: CPT | Performed by: HOSPITALIST

## 2022-12-04 PROCEDURE — 99244 OFF/OP CNSLTJ NEW/EST MOD 40: CPT | Performed by: INTERNAL MEDICINE

## 2022-12-04 PROCEDURE — 76705 ECHO EXAM OF ABDOMEN: CPT | Performed by: EMERGENCY MEDICINE

## 2022-12-04 PROCEDURE — 74177 CT ABD & PELVIS W/CONTRAST: CPT | Performed by: EMERGENCY MEDICINE

## 2022-12-04 RX ORDER — MORPHINE SULFATE 2 MG/ML
1 INJECTION, SOLUTION INTRAMUSCULAR; INTRAVENOUS EVERY 2 HOUR PRN
Status: DISCONTINUED | OUTPATIENT
Start: 2022-12-04 | End: 2022-12-05

## 2022-12-04 RX ORDER — ONDANSETRON 2 MG/ML
4 INJECTION INTRAMUSCULAR; INTRAVENOUS EVERY 6 HOURS PRN
Status: DISCONTINUED | OUTPATIENT
Start: 2022-12-04 | End: 2022-12-05

## 2022-12-04 RX ORDER — MORPHINE SULFATE 2 MG/ML
2 INJECTION, SOLUTION INTRAMUSCULAR; INTRAVENOUS EVERY 2 HOUR PRN
Status: DISCONTINUED | OUTPATIENT
Start: 2022-12-04 | End: 2022-12-05

## 2022-12-04 RX ORDER — ONDANSETRON 2 MG/ML
4 INJECTION INTRAMUSCULAR; INTRAVENOUS ONCE
Status: COMPLETED | OUTPATIENT
Start: 2022-12-04 | End: 2022-12-04

## 2022-12-04 RX ORDER — MORPHINE SULFATE 2 MG/ML
0.5 INJECTION, SOLUTION INTRAMUSCULAR; INTRAVENOUS EVERY 2 HOUR PRN
Status: DISCONTINUED | OUTPATIENT
Start: 2022-12-04 | End: 2022-12-05

## 2022-12-04 RX ORDER — ACETAMINOPHEN 325 MG/1
650 TABLET ORAL EVERY 6 HOURS PRN
Status: DISCONTINUED | OUTPATIENT
Start: 2022-12-04 | End: 2022-12-05

## 2022-12-04 RX ORDER — SODIUM CHLORIDE 9 MG/ML
INJECTION, SOLUTION INTRAVENOUS CONTINUOUS
Status: DISCONTINUED | OUTPATIENT
Start: 2022-12-04 | End: 2022-12-05

## 2022-12-04 RX ORDER — ONDANSETRON 2 MG/ML
4 INJECTION INTRAMUSCULAR; INTRAVENOUS EVERY 6 HOURS PRN
Status: DISCONTINUED | OUTPATIENT
Start: 2022-12-04 | End: 2022-12-04

## 2022-12-04 NOTE — ED QUICK NOTES
Orders for admission, patient is aware of plan and ready to go upstairs. Any questions, please call ED RN Juan Duenas  at extension [phone]. Type of COVID test sent: Rapid   COVID Suspicion level: Low    Titratable drug(s) infusing: NA  Rate:    LOC at time of transport:AOx4    Other pertinent information: Primarily Setswana speaking patient, yet understands some english and speaks some english. Patient adult children just left for the night.      CIWA score=  NIH score=

## 2022-12-04 NOTE — ED INITIAL ASSESSMENT (HPI)
Abdominal pain and nausea for 2 weeks, with the pain sometimes radiating to the flanks. Today the patient's pain increased, and she also had one episode of vomiting after eating. Patient is able to drink water without issues. Denies diarrhea, denies dysuria. Denies chest pain, denies difficulty breathing.

## 2022-12-04 NOTE — PLAN OF CARE
IVF infusing. Seen by GI, plans for EGD tomorrow. Consents signed. Started on clear liquids, tolerating well. NPO at midnight. Morphine given 1x for pain with relief. Ambulating independently, voiding freely . Vitals remain stable.      Problem: Patient Centered Care  Goal: Patient preferences are identified and integrated in the patient's plan of care  Description: Interventions:  - What would you like us to know as we care for you?   - Provide timely, complete, and accurate information to patient/family  - Incorporate patient and family knowledge, values, beliefs, and cultural backgrounds into the planning and delivery of care  - Encourage patient/family to participate in care and decision-making at the level they choose  - Honor patient and family perspectives and choices  12/4/2022 1011 by Aisha Prabhakar RN  Outcome: Progressing  12/4/2022 1009 by Aisha Prabhakar RN  Outcome: Progressing     Problem: Patient/Family Goals  Goal: Patient/Family Long Term Goal  Description: Patient's Long Term Goal: go home    Interventions:  - prn meds for nausea/pain  -scheduled acid reducer  -IVF  - See additional Care Plan goals for specific interventions  12/4/2022 1011 by Aisha Prabhakar RN  Outcome: Progressing  12/4/2022 1009 by Aisha Prabhakar RN  Outcome: Progressing  Goal: Patient/Family Short Term Goal  Description: Patient's Short Term Goal: resolve pain    Interventions:   - prn medications  - See additional Care Plan goals for specific interventions  12/4/2022 1011 by Aisha Prabhakar RN  Outcome: Progressing  12/4/2022 1009 by Aisha Prabhakar RN  Outcome: Progressing     Problem: PAIN - ADULT  Goal: Verbalizes/displays adequate comfort level or patient's stated pain goal  Description: INTERVENTIONS:  - Encourage pt to monitor pain and request assistance  - Assess pain using appropriate pain scale  - Administer analgesics based on type and severity of pain and evaluate response  - Implement non-pharmacological measures as appropriate and evaluate response  - Consider cultural and social influences on pain and pain management  - Manage/alleviate anxiety  - Utilize distraction and/or relaxation techniques  - Monitor for opioid side effects  - Notify MD/LIP if interventions unsuccessful or patient reports new pain  - Anticipate increased pain with activity and pre-medicate as appropriate  Outcome: Progressing     Problem: RISK FOR INFECTION - ADULT  Goal: Absence of fever/infection during anticipated neutropenic period  Description: INTERVENTIONS  - Monitor WBC  - Administer growth factors as ordered  - Implement neutropenic guidelines  Outcome: Progressing     Problem: SAFETY ADULT - FALL  Goal: Free from fall injury  Description: INTERVENTIONS:  - Assess pt frequently for physical needs  - Identify cognitive and physical deficits and behaviors that affect risk of falls.   - [place] fall precautions as indicated by assessment.  - Educate pt/family on patient safety including physical limitations  - Instruct pt to call for assistance with activity based on assessment  - Modify environment to reduce risk of injury  - Provide assistive devices as appropriate  - Consider OT/PT consult to assist with strengthening/mobility  - Encourage toileting schedule  Outcome: Progressing     Problem: GASTROINTESTINAL - ADULT  Goal: Minimal or absence of nausea and vomiting  Description: INTERVENTIONS:  - Maintain adequate hydration with IV or PO as ordered and tolerated  - Nasogastric tube to low intermittent suction as ordered  - Evaluate effectiveness of ordered antiemetic medications  - Provide nonpharmacologic comfort measures as appropriate  - Advance diet as tolerated, if ordered  - Obtain nutritional consult as needed  - Evaluate fluid balance  Outcome: Progressing  Goal: Maintains or returns to baseline bowel function  Description: INTERVENTIONS:  - Assess bowel function  - Maintain adequate hydration with IV or PO as ordered and tolerated  - Evaluate effectiveness of GI medications  - Encourage mobilization and activity  - Obtain nutritional consult as needed  - Establish a toileting routine/schedule  - Consider collaborating with pharmacy to review patient's medication profile  Outcome: Progressing  Goal: Maintains adequate nutritional intake (undernourished)  Description: INTERVENTIONS:  - Monitor percentage of each meal consumed  - Identify factors contributing to decreased intake, treat as appropriate  - Assist with meals as needed  - Monitor I&O, WT and lab values  - Obtain nutritional consult as needed  - Optimize oral hygiene and moisture  - Encourage food from home; allow for food preferences  - Enhance eating environment  Outcome: Progressing

## 2022-12-05 ENCOUNTER — TELEPHONE (OUTPATIENT)
Facility: CLINIC | Age: 57
End: 2022-12-05

## 2022-12-05 ENCOUNTER — ANESTHESIA (OUTPATIENT)
Dept: ENDOSCOPY | Facility: HOSPITAL | Age: 57
End: 2022-12-05
Payer: COMMERCIAL

## 2022-12-05 VITALS
TEMPERATURE: 98 F | HEART RATE: 74 BPM | BODY MASS INDEX: 25.93 KG/M2 | DIASTOLIC BLOOD PRESSURE: 84 MMHG | SYSTOLIC BLOOD PRESSURE: 144 MMHG | OXYGEN SATURATION: 100 % | WEIGHT: 146.31 LBS | RESPIRATION RATE: 20 BRPM | HEIGHT: 63 IN

## 2022-12-05 LAB
ANION GAP SERPL CALC-SCNC: 6 MMOL/L (ref 0–18)
BASOPHILS # BLD AUTO: 0.03 X10(3) UL (ref 0–0.2)
BASOPHILS NFR BLD AUTO: 0.5 %
BUN BLD-MCNC: 5 MG/DL (ref 7–18)
BUN/CREAT SERPL: 9.6 (ref 10–20)
CALCIUM BLD-MCNC: 8.6 MG/DL (ref 8.5–10.1)
CHLORIDE SERPL-SCNC: 111 MMOL/L (ref 98–112)
CO2 SERPL-SCNC: 28 MMOL/L (ref 21–32)
CREAT BLD-MCNC: 0.52 MG/DL
DEPRECATED RDW RBC AUTO: 44.1 FL (ref 35.1–46.3)
EOSINOPHIL # BLD AUTO: 0.15 X10(3) UL (ref 0–0.7)
EOSINOPHIL NFR BLD AUTO: 2.5 %
ERYTHROCYTE [DISTWIDTH] IN BLOOD BY AUTOMATED COUNT: 12.9 % (ref 11–15)
GFR SERPLBLD BASED ON 1.73 SQ M-ARVRAT: 108 ML/MIN/1.73M2 (ref 60–?)
GLUCOSE BLD-MCNC: 92 MG/DL (ref 70–99)
HCT VFR BLD AUTO: 41.8 %
HGB BLD-MCNC: 13.2 G/DL
IMM GRANULOCYTES # BLD AUTO: 0.02 X10(3) UL (ref 0–1)
IMM GRANULOCYTES NFR BLD: 0.3 %
LYMPHOCYTES # BLD AUTO: 1.61 X10(3) UL (ref 1–4)
LYMPHOCYTES NFR BLD AUTO: 26.9 %
MCH RBC QN AUTO: 29.4 PG (ref 26–34)
MCHC RBC AUTO-ENTMCNC: 31.6 G/DL (ref 31–37)
MCV RBC AUTO: 93.1 FL
MONOCYTES # BLD AUTO: 0.42 X10(3) UL (ref 0.1–1)
MONOCYTES NFR BLD AUTO: 7 %
NEUTROPHILS # BLD AUTO: 3.75 X10 (3) UL (ref 1.5–7.7)
NEUTROPHILS # BLD AUTO: 3.75 X10(3) UL (ref 1.5–7.7)
NEUTROPHILS NFR BLD AUTO: 62.8 %
OSMOLALITY SERPL CALC.SUM OF ELEC: 297 MOSM/KG (ref 275–295)
PLATELET # BLD AUTO: 303 10(3)UL (ref 150–450)
POTASSIUM SERPL-SCNC: 3.4 MMOL/L (ref 3.5–5.1)
RBC # BLD AUTO: 4.49 X10(6)UL
SODIUM SERPL-SCNC: 145 MMOL/L (ref 136–145)
WBC # BLD AUTO: 6 X10(3) UL (ref 4–11)

## 2022-12-05 PROCEDURE — 43239 EGD BIOPSY SINGLE/MULTIPLE: CPT | Performed by: INTERNAL MEDICINE

## 2022-12-05 PROCEDURE — 99217 OBSERVATION CARE DISCHARGE: CPT | Performed by: HOSPITALIST

## 2022-12-05 PROCEDURE — 0DB68ZX EXCISION OF STOMACH, VIA NATURAL OR ARTIFICIAL OPENING ENDOSCOPIC, DIAGNOSTIC: ICD-10-PCS | Performed by: INTERNAL MEDICINE

## 2022-12-05 PROCEDURE — 0DB78ZX EXCISION OF STOMACH, PYLORUS, VIA NATURAL OR ARTIFICIAL OPENING ENDOSCOPIC, DIAGNOSTIC: ICD-10-PCS | Performed by: INTERNAL MEDICINE

## 2022-12-05 RX ORDER — SODIUM CHLORIDE, SODIUM LACTATE, POTASSIUM CHLORIDE, CALCIUM CHLORIDE 600; 310; 30; 20 MG/100ML; MG/100ML; MG/100ML; MG/100ML
INJECTION, SOLUTION INTRAVENOUS CONTINUOUS
OUTPATIENT
Start: 2022-12-05

## 2022-12-05 RX ORDER — PROCHLORPERAZINE EDISYLATE 5 MG/ML
5 INJECTION INTRAMUSCULAR; INTRAVENOUS ONCE AS NEEDED
OUTPATIENT
Start: 2022-12-05 | End: 2022-12-05

## 2022-12-05 RX ORDER — LIDOCAINE HYDROCHLORIDE 10 MG/ML
INJECTION, SOLUTION EPIDURAL; INFILTRATION; INTRACAUDAL; PERINEURAL AS NEEDED
Status: DISCONTINUED | OUTPATIENT
Start: 2022-12-05 | End: 2022-12-05 | Stop reason: SURG

## 2022-12-05 RX ORDER — PANTOPRAZOLE SODIUM 40 MG/1
40 TABLET, DELAYED RELEASE ORAL
Qty: 60 TABLET | Refills: 0 | Status: SHIPPED | OUTPATIENT
Start: 2022-12-05 | End: 2023-01-04

## 2022-12-05 RX ORDER — NALOXONE HYDROCHLORIDE 0.4 MG/ML
80 INJECTION, SOLUTION INTRAMUSCULAR; INTRAVENOUS; SUBCUTANEOUS AS NEEDED
OUTPATIENT
Start: 2022-12-05 | End: 2022-12-05

## 2022-12-05 RX ORDER — SODIUM CHLORIDE, SODIUM LACTATE, POTASSIUM CHLORIDE, CALCIUM CHLORIDE 600; 310; 30; 20 MG/100ML; MG/100ML; MG/100ML; MG/100ML
INJECTION, SOLUTION INTRAVENOUS CONTINUOUS PRN
Status: DISCONTINUED | OUTPATIENT
Start: 2022-12-05 | End: 2022-12-05 | Stop reason: SURG

## 2022-12-05 RX ORDER — ONDANSETRON 2 MG/ML
4 INJECTION INTRAMUSCULAR; INTRAVENOUS ONCE AS NEEDED
OUTPATIENT
Start: 2022-12-05 | End: 2022-12-05

## 2022-12-05 RX ORDER — POTASSIUM CHLORIDE 20 MEQ/1
40 TABLET, EXTENDED RELEASE ORAL ONCE
Status: COMPLETED | OUTPATIENT
Start: 2022-12-05 | End: 2022-12-05

## 2022-12-05 RX ADMIN — LIDOCAINE HYDROCHLORIDE 80 MG: 10 INJECTION, SOLUTION EPIDURAL; INFILTRATION; INTRACAUDAL; PERINEURAL at 13:33:00

## 2022-12-05 RX ADMIN — SODIUM CHLORIDE, SODIUM LACTATE, POTASSIUM CHLORIDE, CALCIUM CHLORIDE: 600; 310; 30; 20 INJECTION, SOLUTION INTRAVENOUS at 13:33:00

## 2022-12-05 NOTE — OPERATIVE REPORT
ESOPHAGOGASTRODUODENOSCOPY (EGD) REPORT    Radha Pena     1965 Age 61year old   PCP Isadroa Rojas MD Endoscopist Blanche Bee MD     Date of procedure: 22    Procedure: EGD w/biopsy    Pre-operative diagnosis: epigastric abd pain    Post-operative diagnosis: see impression    Medications: MAC    Complications: none    Procedure:  Informed consent was obtained from the patient after the risks of the procedure were discussed, including but not limited to bleeding, perforation, aspiration, infection, or possibility of a missed lesion. After discussions of the risks/benefits and alternatives to this procedure, as well as the planned sedation, the patient was placed in the left lateral decubitus position and begun on continuous blood pressure pulse oximetry and EKG monitoring and this was maintained throughout the procedure. Once an adequate level of sedation was obtained a bite block was placed. Then the lubricated tip of the [id number] diagnostic video upper endoscope was inserted and advanced using direct visualization into the posterior pharynx and ultimately into the esophagus, stomach, and duodenum. Complications: None    Findings:      1. Esophagus: The squamocolumnar junction was noted at 35 cm and appeared normal. The diaphragmatic pinch was noted noted at 35 cm from the incisors. The esophagus appeared normal throughout its entire length with no evidence of rings, webs or luminal narrowing. There was no evidence of erosions, ulcerations, varices, or masses. 2. Stomach: We then entered the stomach. A 6-8 mm clean based ulcer in the antrum, non-bleeding. Remainder of gastric mucosa appeared normal. There was no evidence of any luminal or submucosal masses. A retroflexed view allowed examination of the angularis, cardia and fundus and these areas also appeared normal with a non-patulous cardia. No hiatal hernia seen.  Random biopsies of the stomach (body, antrum, cardia, and incisura) were taken with cold forceps for histology. 3. Duodenum: The duodenal mucosa appeared normal in the 1st and 2nd portion of the duodenum. We then withdrew the instrument from the patient who tolerated the procedure well. Impression:   1. Small clean based gastric ulcer. Gastric biopsies obtained    Recommend:  1. Continue ppi 40mg twice daily x 8 weeks  2. Follow-up pathology to exclude h pylori infection  3. Follow-up gi clinic upon discharge    >>>If tissue was sampled/removed and you have not received your pathology results either by phone or letter within 2 weeks, please call our office at [phone].     Specimens:  Gastric     Blood loss: <1 ml      Marie Bruno MD  [facility], [facility]astro[facility]

## 2022-12-05 NOTE — PROGRESS NOTES
Patient is tolerating diet and is ready to go home. Family at bedside to drive her. Patient leaving with all belongings. Discharge instructions and follow up instructions given and patient and family verbalized their understanding.

## 2022-12-05 NOTE — ANESTHESIA POSTPROCEDURE EVALUATION
Patient: Emeli Maria    Procedure Summary     Date: 12/05/22 Room / Location: [address] [place] [address] ENDOSCOPY 05 / [facility] ENDOSCOPY    Anesthesia Start: 3882 Anesthesia Stop: 1178    Procedure: ESOPHAGOGASTRODUODENOSCOPY (EGD) Diagnosis: (gastric ulcer; fundic gland polyps)    Surgeons: Harris Doan MD Anesthesiologist: Luis Alberto Patel MD    Anesthesia Type: MAC ASA Status: 2          Anesthesia Type: MAC    Vitals Value Taken Time   BP 94/77 12/05/22 1345   Temp 97.5F 12/05/22 1347   Pulse 82 12/05/22 1346   Resp 18 12/05/22 1346   SpO2 96 % 12/05/22 1346   Vitals shown include unvalidated device data.     [address] [place] [address] AN Post Evaluation:   Patient Evaluated in PACU  Patient Participation: complete - patient participated  Level of Consciousness: sleepy but conscious  Pain Management: satisfactory to patient  Airway Patency:patent  Dental exam unchanged from preop  Yes    Cardiovascular Status: hemodynamically stable  Respiratory Status: nonlabored ventilation and spontaneous ventilation  Postoperative Hydration balanced      Rex Lesch, MD  12/5/2022 1:47 PM

## 2022-12-05 NOTE — DISCHARGE INSTRUCTIONS
1. Continue ppi 40mg twice daily x 8 weeks  2. Follow-up pathology to exclude h pylori infection  3.  Follow-up gi clinic upon discharge     >>>If tissue was sampled/removed and you have not received your pathology results either by phone or letter within 2 weeks, please call our office at [phone]

## 2022-12-05 NOTE — INTERVAL H&P NOTE
Pre-op Diagnosis: Epigastric pain, abnormal CT abdomen    The above referenced H&P was reviewed by Artur Pittman MD on 12/5/2022, the patient was examined and no significant changes have occurred in the patient's condition since the H&P was performed. I discussed with the patient and/or legal representative the potential benefits, risks and side effects of this procedure; the likelihood of the patient achieving goals; and potential problems that might occur during recuperation. I discussed reasonable alternatives to the procedure, including risks, benefits and side effects related to the alternatives and risks related to not receiving this procedure. We will proceed with procedure as planned.

## 2022-12-05 NOTE — PLAN OF CARE
No acute changes overnight. C/o of headache pain managed with tylenol. No complaints if abd pain. NPO after midnight maintained. IVF running. Up independently. Voiding freely. Plan for EGD today.      Problem: Patient Centered Care  Goal: Patient preferences are identified and integrated in the patient's plan of care  Description: Interventions:  - What would you like us to know as we care for you?   - Provide timely, complete, and accurate information to patient/family  - Incorporate patient and family knowledge, values, beliefs, and cultural backgrounds into the planning and delivery of care  - Encourage patient/family to participate in care and decision-making at the level they choose  - Honor patient and family perspectives and choices  Outcome: Progressing     Problem: Patient/Family Goals  Goal: Patient/Family Long Term Goal  Description: Patient's Long Term Goal: go home    Interventions:  - prn meds for nausea/pain  -scheduled acid reducer  -IVF  - See additional Care Plan goals for specific interventions  Outcome: Progressing  Goal: Patient/Family Short Term Goal  Description: Patient's Short Term Goal: resolve pain    Interventions:   - prn medications  - See additional Care Plan goals for specific interventions  Outcome: Progressing     Problem: PAIN - ADULT  Goal: Verbalizes/displays adequate comfort level or patient's stated pain goal  Description: INTERVENTIONS:  - Encourage pt to monitor pain and request assistance  - Assess pain using appropriate pain scale  - Administer analgesics based on type and severity of pain and evaluate response  - Implement non-pharmacological measures as appropriate and evaluate response  - Consider cultural and social influences on pain and pain management  - Manage/alleviate anxiety  - Utilize distraction and/or relaxation techniques  - Monitor for opioid side effects  - Notify MD/LIP if interventions unsuccessful or patient reports new pain  - Anticipate increased pain with activity and pre-medicate as appropriate  Outcome: Progressing     Problem: RISK FOR INFECTION - ADULT  Goal: Absence of fever/infection during anticipated neutropenic period  Description: INTERVENTIONS  - Monitor WBC  - Administer growth factors as ordered  - Implement neutropenic guidelines  Outcome: Progressing     Problem: SAFETY ADULT - FALL  Goal: Free from fall injury  Description: INTERVENTIONS:  - Assess pt frequently for physical needs  - Identify cognitive and physical deficits and behaviors that affect risk of falls.   - [place] fall precautions as indicated by assessment.  - Educate pt/family on patient safety including physical limitations  - Instruct pt to call for assistance with activity based on assessment  - Modify environment to reduce risk of injury  - Provide assistive devices as appropriate  - Consider OT/PT consult to assist with strengthening/mobility  - Encourage toileting schedule  Outcome: Progressing     Problem: GASTROINTESTINAL - ADULT  Goal: Minimal or absence of nausea and vomiting  Description: INTERVENTIONS:  - Maintain adequate hydration with IV or PO as ordered and tolerated  - Nasogastric tube to low intermittent suction as ordered  - Evaluate effectiveness of ordered antiemetic medications  - Provide nonpharmacologic comfort measures as appropriate  - Advance diet as tolerated, if ordered  - Obtain nutritional consult as needed  - Evaluate fluid balance  Outcome: Progressing  Goal: Maintains or returns to baseline bowel function  Description: INTERVENTIONS:  - Assess bowel function  - Maintain adequate hydration with IV or PO as ordered and tolerated  - Evaluate effectiveness of GI medications  - Encourage mobilization and activity  - Obtain nutritional consult as needed  - Establish a toileting routine/schedule  - Consider collaborating with pharmacy to review patient's medication profile  Outcome: Progressing  Goal: Maintains adequate nutritional intake (undernourished)  Description: INTERVENTIONS:  - Monitor percentage of each meal consumed  - Identify factors contributing to decreased intake, treat as appropriate  - Assist with meals as needed  - Monitor I&O, WT and lab values  - Obtain nutritional consult as needed  - Optimize oral hygiene and moisture  - Encourage food from home; allow for food preferences  - Enhance eating environment  Outcome: Progressing

## 2022-12-06 ENCOUNTER — PATIENT OUTREACH (OUTPATIENT)
Dept: CASE MANAGEMENT | Age: 57
End: 2022-12-06

## 2022-12-06 NOTE — TELEPHONE ENCOUNTER
Pts daughter also called to schedule hosp follow up appt with Dr. Felicita Lopez. No appts available in one month.   Please call [phone] or call [phone]

## 2022-12-06 NOTE — TELEPHONE ENCOUNTER
Dr. Albert Conklin,    Per Dr. Hesham Khan, patient to follow up in 1 month for hospital follow up. Please advise, thank you.

## 2022-12-08 ENCOUNTER — MED REC SCAN ONLY (OUTPATIENT)
Facility: CLINIC | Age: 57
End: 2022-12-08

## 2022-12-08 NOTE — TELEPHONE ENCOUNTER
Attempted to reach daughter Brittany aDwkins, went straight to voicemail but unable to leave message.  used #[phone]  Left message for patient to call back. If patient/daughter returns call please offer appt on 12/20 at 9am with Dr. Katlyn Lynn.

## 2022-12-08 NOTE — TELEPHONE ENCOUNTER
Can offer at 9 AM appt    Thanks    Latoya Preston MD  [address] - Gastroenterology  12/8/2022  11:59 AM

## 2022-12-09 NOTE — TELEPHONE ENCOUNTER
Your appointments     Date & Time Appointment Department [facility])    Dec 20, 2022  9:00 AM CST Follow Up Visit with Aliya Christensen MD [address] [place], [address], [place] ([address])

## 2022-12-15 ENCOUNTER — HOSPITAL ENCOUNTER (OUTPATIENT)
Age: 57
Discharge: HOME | End: 2022-12-15
Payer: COMMERCIAL

## 2022-12-15 ENCOUNTER — TELEPHONE (OUTPATIENT)
Dept: CARDIOLOGY UNIT | Facility: HOSPITAL | Age: 57
End: 2022-12-15

## 2022-12-15 VITALS
TEMPERATURE: 98 F | SYSTOLIC BLOOD PRESSURE: 145 MMHG | RESPIRATION RATE: 18 BRPM | HEART RATE: 76 BPM | DIASTOLIC BLOOD PRESSURE: 77 MMHG | OXYGEN SATURATION: 99 %

## 2022-12-15 DIAGNOSIS — G43.809 OTHER MIGRAINE WITHOUT STATUS MIGRAINOSUS, NOT INTRACTABLE: Primary | ICD-10-CM

## 2022-12-15 RX ORDER — KETOROLAC TROMETHAMINE 30 MG/ML
30 INJECTION, SOLUTION INTRAMUSCULAR; INTRAVENOUS ONCE
Status: COMPLETED | OUTPATIENT
Start: 2022-12-15 | End: 2022-12-15

## 2022-12-15 RX ORDER — DIPHENHYDRAMINE HYDROCHLORIDE 50 MG/ML
25 INJECTION INTRAMUSCULAR; INTRAVENOUS ONCE
Status: COMPLETED | OUTPATIENT
Start: 2022-12-15 | End: 2022-12-15

## 2022-12-15 RX ORDER — SODIUM CHLORIDE 9 MG/ML
1000 INJECTION, SOLUTION INTRAVENOUS ONCE
Status: COMPLETED | OUTPATIENT
Start: 2022-12-15 | End: 2022-12-15

## 2022-12-15 NOTE — ED INITIAL ASSESSMENT (HPI)
Pt reports headache 10/10 pain beginning 10pm last night. Hx of migraines, took sumatriptan last night. Emesis at 10am. +dizziness.

## 2022-12-16 ENCOUNTER — TELEPHONE (OUTPATIENT)
Dept: INTERNAL MEDICINE CLINIC | Facility: CLINIC | Age: 57
End: 2022-12-16

## 2022-12-16 NOTE — TELEPHONE ENCOUNTER
Patient is requesting ED appt for this month. Patient is aware pcp does not have anything available until next year. Please call patient back.

## 2022-12-20 ENCOUNTER — OFFICE VISIT (OUTPATIENT)
Dept: GASTROENTEROLOGY | Facility: CLINIC | Age: 57
End: 2022-12-20
Payer: COMMERCIAL

## 2022-12-20 ENCOUNTER — TELEPHONE (OUTPATIENT)
Dept: GASTROENTEROLOGY | Facility: CLINIC | Age: 57
End: 2022-12-20

## 2022-12-20 VITALS
HEIGHT: 63 IN | WEIGHT: 154.19 LBS | BODY MASS INDEX: 27.32 KG/M2 | HEART RATE: 61 BPM | DIASTOLIC BLOOD PRESSURE: 78 MMHG | SYSTOLIC BLOOD PRESSURE: 145 MMHG

## 2022-12-20 DIAGNOSIS — K25.9 GASTRIC ULCER WITHOUT HEMORRHAGE OR PERFORATION, UNSPECIFIED CHRONICITY: Primary | ICD-10-CM

## 2022-12-20 PROCEDURE — 3008F BODY MASS INDEX DOCD: CPT | Performed by: INTERNAL MEDICINE

## 2022-12-20 PROCEDURE — 3077F SYST BP >= 140 MM HG: CPT | Performed by: INTERNAL MEDICINE

## 2022-12-20 PROCEDURE — 99214 OFFICE O/P EST MOD 30 MIN: CPT | Performed by: INTERNAL MEDICINE

## 2022-12-20 PROCEDURE — 3078F DIAST BP <80 MM HG: CPT | Performed by: INTERNAL MEDICINE

## 2022-12-20 NOTE — TELEPHONE ENCOUNTER
Scheduled for:  Yumiko Rincon   Provider Name:  Janey Shelton  Date:  2/22/2023  Location:  [facility]  Sedation:  Mac   Time:  1:00 Pm(pt is aware that Chad Levin will call the day before to confirm arrival time)     Prep:  Egd -Npo 3 hours before prior to procedure arrival Prep instructions were given to pt in the office, pt verbalized understanding. Meds/Allergies Reconciled?:  Physician reviewed     Diagnosis with codes:  Gastric ulcer w/o hemorrhage or perforation - K25.9  Was patient informed to call insurance with codes (Y/N):  Yes, I confirmed [address] [place] [address]  insurance with the patient. The patient also verbally understands to call her  insurance to check for pre-cert, codes were given on prep instructions. Referral sent?:  Yes  [facility] or [address] notified?: Electronic case request was sent to Chad Levin via [employer]. Medication Orders: This patient verbally confirmed that she is not taking:   Iron, blood thinners, BP meds, and is not diabetic   Not latex allergy, Not PCN allergy and does not have a pacemaker Pt is aware to NOT take iron pills, herbal meds and diet supplements for 7 days before exam. Also to NOT take any form of alcohol, recreational drugs and any forms of ED meds 24 hours before exam.    Misc Orders:  Patient was informed that they will need a COVID 19 test prior to their procedure. Patient verbally understood & will await a phone call from [facility] to schedule.       Further instructions given by staff:

## 2022-12-22 ENCOUNTER — OFFICE VISIT (OUTPATIENT)
Dept: INTERNAL MEDICINE CLINIC | Facility: CLINIC | Age: 57
End: 2022-12-22
Payer: COMMERCIAL

## 2022-12-22 VITALS
TEMPERATURE: 98 F | BODY MASS INDEX: 27 KG/M2 | HEART RATE: 74 BPM | SYSTOLIC BLOOD PRESSURE: 126 MMHG | OXYGEN SATURATION: 98 % | DIASTOLIC BLOOD PRESSURE: 70 MMHG | WEIGHT: 153 LBS

## 2022-12-22 DIAGNOSIS — G43.809 OTHER MIGRAINE WITHOUT STATUS MIGRAINOSUS, NOT INTRACTABLE: Primary | ICD-10-CM

## 2022-12-22 PROCEDURE — 3074F SYST BP LT 130 MM HG: CPT | Performed by: INTERNAL MEDICINE

## 2022-12-22 PROCEDURE — 3078F DIAST BP <80 MM HG: CPT | Performed by: INTERNAL MEDICINE

## 2022-12-22 PROCEDURE — 99214 OFFICE O/P EST MOD 30 MIN: CPT | Performed by: INTERNAL MEDICINE

## 2023-01-09 DIAGNOSIS — M81.0 OSTEOPOROSIS, UNSPECIFIED OSTEOPOROSIS TYPE, UNSPECIFIED PATHOLOGICAL FRACTURE PRESENCE: ICD-10-CM

## 2023-01-10 NOTE — TELEPHONE ENCOUNTER
Patient is requesting the following medication. Patient wants to know if pcp can send her medication for her fett states she feels a burning sensation. Please call and advise.              meclizine 25 MG Oral Tab

## 2023-01-12 RX ORDER — PANTOPRAZOLE SODIUM 40 MG/1
40 TABLET, DELAYED RELEASE ORAL
Qty: 60 TABLET | Refills: 2 | Status: SHIPPED | OUTPATIENT
Start: 2023-01-12

## 2023-01-12 NOTE — TELEPHONE ENCOUNTER
Dr. Kayleigh Breaux,    Patient is requesting refill for pantoprazole. Last filled by MD in ER. Pended orders below, please review and sign if appropriate.

## 2023-01-18 RX ORDER — ALENDRONATE SODIUM 70 MG/1
TABLET ORAL
Qty: 4 TABLET | Refills: 2 | Status: SHIPPED | OUTPATIENT
Start: 2023-01-18

## 2023-02-22 ENCOUNTER — MED REC SCAN ONLY (OUTPATIENT)
Facility: CLINIC | Age: 58
End: 2023-02-22

## 2023-02-22 ENCOUNTER — SURGERY CENTER DOCUMENTATION (OUTPATIENT)
Dept: SURGERY | Age: 58
End: 2023-02-22

## 2023-02-22 ENCOUNTER — LAB REQUISITION (OUTPATIENT)
Dept: SURGERY | Age: 58
End: 2023-02-22
Payer: COMMERCIAL

## 2023-02-22 DIAGNOSIS — K25.9 GASTRIC ULCER WITHOUT HEMORRHAGE OR PERFORATION, UNSPECIFIED CHRONICITY: ICD-10-CM

## 2023-02-22 PROCEDURE — 88312 SPECIAL STAINS GROUP 1: CPT | Performed by: INTERNAL MEDICINE

## 2023-02-22 PROCEDURE — 88305 TISSUE EXAM BY PATHOLOGIST: CPT | Performed by: INTERNAL MEDICINE

## 2023-02-22 PROCEDURE — 43239 EGD BIOPSY SINGLE/MULTIPLE: CPT | Performed by: INTERNAL MEDICINE

## 2023-02-22 NOTE — PROCEDURES
Esophagogastroduodenoscopy (EGD) Report    Burton Joel     1965 Age 61year old   PCP Ochoa Sales MD Endoscopist Ebony Leon MD     Date of procedure: 23    Procedure: EGD w/ biopsy     Pre-operative diagnosis: gastric ulcer    History of hospitalization in 2022 for abdominal pain found to have a sub-centimeter gastric ulcer on EGD    Post-operative diagnosis: see impression    Sedation:  monitored anesthesia care (MAC)    Consent: We discussed the risks/benefits and alternatives to this procedure, as well as the planned sedation. Informed consent was obtained from the patient after the risks of the procedure were discussed, including but not limited to bleeding, perforation, aspiration, infection, or possibility of a missed lesion as well as the risks of anesthesia including but not limited to cardiopulmonary complications. The patient signed informed consent and elected to proceed with EGD with intervention [i.e. Biopsy, control of bleeding, dilatation, polypectomy, endoscopic mucosal resection, etc.] as indicated. EGD procedure: The patient was placed in the left lateral decubitus position and begun on continuous blood pressure pulse oximetry and EKG monitoring and this was maintained throughout the procedure. Once an adequate level of sedation was obtained a bite block was placed. Then the lubricated tip of the [id number] diagnostic video upper endoscope was carefully inserted and advanced using direct visualization into the posterior pharynx and ultimately into the esophagus, stomach, and duodenum. Air was then withdrawn and the endoscope was removed. The patient tolerated the procedure well. Estimated blood loss: insignificant    Specimens collected: gastric biopsies    Complications: none    EGD findings:      1. Esophagus: The squamocolumnar junction was noted at 34 cm and appeared regular. The esophageal mucosa appeared unremarkable throughout.   2. Stomach: The stomach distended normally. Normal rugal folds were seen. The pylorus was patent. There were multiple superficial serpiginous and linear ulcerations in the antrum. Multiple cold forceps biopsies were obtained. The gastric mucosa appeared unremarkable otherwise. Retroflexion revealed a normal fundus and cardia. 3. Duodenum: The duodenal mucosa appeared normal in the 1st and 2nd portion of the duodenum. Impression:  1. Multiple superficial serpiginous and linear ulcerations in the antrum  2. Otherwise, unremarkable upper endoscopy    Recommend:  1. Await pathology results  2. Avoid NSAIDs  3. Continue proton pump inhibitor twice daily (after the procedure, she noted taking it sometimes)  4. Follow up in GI clinic on a routine basis    >>>If biopsies were performed and you have not received your pathology results either by phone or letter within 2 weeks, please call our office at [phone].     Dorien Mcburney, MD  [address] - Gastroenterology  2/22/2023

## 2023-02-26 ENCOUNTER — APPOINTMENT (OUTPATIENT)
Dept: CT IMAGING | Facility: HOSPITAL | Age: 58
End: 2023-02-26
Attending: EMERGENCY MEDICINE
Payer: COMMERCIAL

## 2023-02-26 ENCOUNTER — HOSPITAL ENCOUNTER (EMERGENCY)
Facility: HOSPITAL | Age: 58
Discharge: HOME | End: 2023-02-26
Attending: EMERGENCY MEDICINE
Payer: COMMERCIAL

## 2023-02-26 ENCOUNTER — HOSPITAL ENCOUNTER (OUTPATIENT)
Age: 58
Discharge: ANOTHER HOSPITAL | End: 2023-02-26
Payer: COMMERCIAL

## 2023-02-26 VITALS
DIASTOLIC BLOOD PRESSURE: 71 MMHG | HEART RATE: 56 BPM | BODY MASS INDEX: 29.27 KG/M2 | WEIGHT: 155 LBS | TEMPERATURE: 98 F | HEIGHT: 61 IN | SYSTOLIC BLOOD PRESSURE: 145 MMHG | OXYGEN SATURATION: 98 % | RESPIRATION RATE: 16 BRPM

## 2023-02-26 VITALS
RESPIRATION RATE: 18 BRPM | OXYGEN SATURATION: 99 % | SYSTOLIC BLOOD PRESSURE: 143 MMHG | DIASTOLIC BLOOD PRESSURE: 87 MMHG | HEART RATE: 66 BPM | TEMPERATURE: 98 F

## 2023-02-26 DIAGNOSIS — R10.9 ABDOMINAL PAIN OF UNKNOWN ETIOLOGY: Primary | ICD-10-CM

## 2023-02-26 DIAGNOSIS — R10.13 ABDOMINAL PAIN, EPIGASTRIC: Primary | ICD-10-CM

## 2023-02-26 LAB
ALBUMIN SERPL-MCNC: 4 G/DL (ref 3.4–5)
ALBUMIN/GLOB SERPL: 1.1 {RATIO} (ref 1–2)
ALP LIVER SERPL-CCNC: 122 U/L
ALT SERPL-CCNC: 26 U/L
ANION GAP SERPL CALC-SCNC: 5 MMOL/L (ref 0–18)
AST SERPL-CCNC: 17 U/L (ref 15–37)
ATRIAL RATE: 63 BPM
BASOPHILS # BLD AUTO: 0.03 X10(3) UL (ref 0–0.2)
BASOPHILS NFR BLD AUTO: 0.4 %
BILIRUB SERPL-MCNC: 0.5 MG/DL (ref 0.1–2)
BILIRUB UR QL: NEGATIVE
BUN BLD-MCNC: 8 MG/DL (ref 7–18)
BUN/CREAT SERPL: 12.5 (ref 10–20)
CALCIUM BLD-MCNC: 9.4 MG/DL (ref 8.5–10.1)
CHLORIDE SERPL-SCNC: 105 MMOL/L (ref 98–112)
CO2 SERPL-SCNC: 30 MMOL/L (ref 21–32)
CREAT BLD-MCNC: 0.64 MG/DL
DEPRECATED RDW RBC AUTO: 42.2 FL (ref 35.1–46.3)
EOSINOPHIL # BLD AUTO: 0.07 X10(3) UL (ref 0–0.7)
EOSINOPHIL NFR BLD AUTO: 0.9 %
ERYTHROCYTE [DISTWIDTH] IN BLOOD BY AUTOMATED COUNT: 12.8 % (ref 11–15)
GFR SERPLBLD BASED ON 1.73 SQ M-ARVRAT: 102 ML/MIN/1.73M2 (ref 60–?)
GLOBULIN PLAS-MCNC: 3.6 G/DL (ref 2.8–4.4)
GLUCOSE BLD-MCNC: 107 MG/DL (ref 70–99)
GLUCOSE UR-MCNC: NORMAL MG/DL
HCT VFR BLD AUTO: 45.2 %
HGB BLD-MCNC: 14.7 G/DL
HYALINE CASTS #/AREA URNS AUTO: PRESENT /LPF
IMM GRANULOCYTES # BLD AUTO: 0.02 X10(3) UL (ref 0–1)
IMM GRANULOCYTES NFR BLD: 0.2 %
KETONES UR-MCNC: NEGATIVE MG/DL
LEUKOCYTE ESTERASE UR QL STRIP.AUTO: NEGATIVE
LIPASE SERPL-CCNC: 105 U/L (ref 73–393)
LIPASE SERPL-CCNC: 27 U/L (ref 13–75)
LYMPHOCYTES # BLD AUTO: 1.54 X10(3) UL (ref 1–4)
LYMPHOCYTES NFR BLD AUTO: 19.2 %
MAGNESIUM SERPL-MCNC: 2.1 MG/DL (ref 1.6–2.6)
MCH RBC QN AUTO: 29.3 PG (ref 26–34)
MCHC RBC AUTO-ENTMCNC: 32.5 G/DL (ref 31–37)
MCV RBC AUTO: 90 FL
MONOCYTES # BLD AUTO: 0.39 X10(3) UL (ref 0.1–1)
MONOCYTES NFR BLD AUTO: 4.9 %
NEUTROPHILS # BLD AUTO: 5.96 X10 (3) UL (ref 1.5–7.7)
NEUTROPHILS # BLD AUTO: 5.96 X10(3) UL (ref 1.5–7.7)
NEUTROPHILS NFR BLD AUTO: 74.4 %
NITRITE UR QL STRIP.AUTO: NEGATIVE
OSMOLALITY SERPL CALC.SUM OF ELEC: 289 MOSM/KG (ref 275–295)
P AXIS: 14 DEGREES
P-R INTERVAL: 106 MS
PH UR: 6.5 [PH] (ref 5–8)
PLATELET # BLD AUTO: 355 10(3)UL (ref 150–450)
POTASSIUM SERPL-SCNC: 3.4 MMOL/L (ref 3.5–5.1)
PROT SERPL-MCNC: 7.6 G/DL (ref 6.4–8.2)
PROT UR-MCNC: NEGATIVE MG/DL
Q-T INTERVAL: 414 MS
QRS DURATION: 84 MS
QTC CALCULATION (BEZET): 423 MS
R AXIS: 62 DEGREES
RBC # BLD AUTO: 5.02 X10(6)UL
SODIUM SERPL-SCNC: 140 MMOL/L (ref 136–145)
SP GR UR STRIP: <1.005 (ref 1–1.03)
T AXIS: 68 DEGREES
UROBILINOGEN UR STRIP-ACNC: NORMAL
VENTRICULAR RATE: 63 BPM
WBC # BLD AUTO: 8 X10(3) UL (ref 4–11)

## 2023-02-26 PROCEDURE — S0028 INJECTION, FAMOTIDINE, 20 MG: HCPCS | Performed by: EMERGENCY MEDICINE

## 2023-02-26 PROCEDURE — 93010 ELECTROCARDIOGRAM REPORT: CPT

## 2023-02-26 PROCEDURE — 96374 THER/PROPH/DIAG INJ IV PUSH: CPT

## 2023-02-26 PROCEDURE — 99285 EMERGENCY DEPT VISIT HI MDM: CPT

## 2023-02-26 PROCEDURE — 74177 CT ABD & PELVIS W/CONTRAST: CPT | Performed by: EMERGENCY MEDICINE

## 2023-02-26 PROCEDURE — 85025 COMPLETE CBC W/AUTO DIFF WBC: CPT | Performed by: EMERGENCY MEDICINE

## 2023-02-26 PROCEDURE — 96375 TX/PRO/DX INJ NEW DRUG ADDON: CPT

## 2023-02-26 PROCEDURE — 93005 ELECTROCARDIOGRAM TRACING: CPT

## 2023-02-26 PROCEDURE — 81001 URINALYSIS AUTO W/SCOPE: CPT | Performed by: EMERGENCY MEDICINE

## 2023-02-26 PROCEDURE — 99212 OFFICE O/P EST SF 10 MIN: CPT | Performed by: PHYSICIAN ASSISTANT

## 2023-02-26 PROCEDURE — 83690 ASSAY OF LIPASE: CPT | Performed by: EMERGENCY MEDICINE

## 2023-02-26 PROCEDURE — 96361 HYDRATE IV INFUSION ADD-ON: CPT

## 2023-02-26 PROCEDURE — 80053 COMPREHEN METABOLIC PANEL: CPT | Performed by: EMERGENCY MEDICINE

## 2023-02-26 PROCEDURE — 83735 ASSAY OF MAGNESIUM: CPT | Performed by: EMERGENCY MEDICINE

## 2023-02-26 RX ORDER — FAMOTIDINE 10 MG/ML
20 INJECTION, SOLUTION INTRAVENOUS ONCE
Status: COMPLETED | OUTPATIENT
Start: 2023-02-26 | End: 2023-02-26

## 2023-02-26 RX ORDER — ONDANSETRON 2 MG/ML
4 INJECTION INTRAMUSCULAR; INTRAVENOUS ONCE
Status: COMPLETED | OUTPATIENT
Start: 2023-02-26 | End: 2023-02-26

## 2023-02-26 RX ORDER — RIBOFLAVIN (VITAMIN B2) 400 MG
1 TABLET ORAL DAILY
Qty: 30 TABLET | Refills: 0 | Status: SHIPPED | OUTPATIENT
Start: 2023-02-26 | End: 2023-03-28

## 2023-02-26 RX ORDER — MAGNESIUM OXIDE 400 MG/1
400 TABLET ORAL DAILY
Qty: 30 TABLET | Refills: 0 | Status: SHIPPED | OUTPATIENT
Start: 2023-02-26 | End: 2023-03-28

## 2023-02-26 NOTE — ED INITIAL ASSESSMENT (HPI)
Pt reports mid upper abdominal pain beginning yesterday with vomiting. Last emesis 1 hour PTA. Diarrhea x 5 times yesterday. Had endoscopy on Wednesday.

## 2023-02-26 NOTE — ED INITIAL ASSESSMENT (HPI)
Patient from [address] [place] [address] with c/o epigastric pain, nausea, vomiting, migraine and intermittent dizziness that began yesterday morning.

## 2023-02-26 NOTE — DISCHARGE INSTRUCTIONS
Please return to the emergency department for any worsening symptoms including but not limited to fevers of 100.4, worsening abdominal pain, decreased desire to eat, weakness, numbness, losing stool/urine on yourself, blood in vomit/stool, chest pain. Return to emergency department for worsening headache, vomiting, changes in mentation, weakness, numbness, losing stool/urine on yourself. Please follow with your primary care provider in the next few days for reevaluation  The Emergency Department is not intended to be a substitute for an effort to provide complete medical care. The imaging, if any, have often been interpreted on a preliminary basis pending final reading by the radiologist. If your blood pressure was greater than 140/90, please have this blood pressure rechecked by your primary care provider wihtin the next few days. You will benefit from a further discussion of lifestyle modifications that include Weight Reduction - Dietary Sodium Restriction - Increased Physical Activity and Moderation in alcohol (ETOH) Consumption. If possible check your pressure at home and keep a blood pressure log to bring to your physician.

## 2023-02-27 ENCOUNTER — PATIENT OUTREACH (OUTPATIENT)
Dept: CASE MANAGEMENT | Age: 58
End: 2023-02-27

## 2023-02-27 NOTE — PROGRESS NOTES
1st attempt; pt had recent ED visit, calling to offer PCP f/u apt (dc 2/26)      Dr. Kristie Mansfield PCP---appt scheduled 2/28 @ 3:00 PM   [address] [phone]      Dr. Abdirashid Vincent Santy---Pt declined at this time   [facility]  [phone]    Closing encounter

## 2023-02-28 ENCOUNTER — TELEPHONE (OUTPATIENT)
Dept: GASTROENTEROLOGY | Facility: CLINIC | Age: 58
End: 2023-02-28

## 2023-02-28 ENCOUNTER — OFFICE VISIT (OUTPATIENT)
Dept: INTERNAL MEDICINE CLINIC | Facility: CLINIC | Age: 58
End: 2023-02-28
Payer: COMMERCIAL

## 2023-02-28 ENCOUNTER — TELEPHONE (OUTPATIENT)
Dept: INTERNAL MEDICINE CLINIC | Facility: CLINIC | Age: 58
End: 2023-02-28

## 2023-02-28 VITALS
HEART RATE: 86 BPM | BODY MASS INDEX: 29 KG/M2 | OXYGEN SATURATION: 97 % | TEMPERATURE: 99 F | DIASTOLIC BLOOD PRESSURE: 70 MMHG | SYSTOLIC BLOOD PRESSURE: 136 MMHG | WEIGHT: 155 LBS

## 2023-02-28 DIAGNOSIS — M79.671 FOOT PAIN, BILATERAL: ICD-10-CM

## 2023-02-28 DIAGNOSIS — R51.9 FREQUENT HEADACHES: ICD-10-CM

## 2023-02-28 DIAGNOSIS — M79.672 FOOT PAIN, BILATERAL: ICD-10-CM

## 2023-02-28 DIAGNOSIS — R51.9 HEADACHE, WORSENING: ICD-10-CM

## 2023-02-28 DIAGNOSIS — K29.50 CHRONIC GASTRITIS WITHOUT BLEEDING, UNSPECIFIED GASTRITIS TYPE: ICD-10-CM

## 2023-02-28 DIAGNOSIS — G43.801 OTHER MIGRAINE WITH STATUS MIGRAINOSUS, NOT INTRACTABLE: Primary | ICD-10-CM

## 2023-02-28 PROCEDURE — 3075F SYST BP GE 130 - 139MM HG: CPT | Performed by: INTERNAL MEDICINE

## 2023-02-28 PROCEDURE — 99214 OFFICE O/P EST MOD 30 MIN: CPT | Performed by: INTERNAL MEDICINE

## 2023-02-28 PROCEDURE — 3078F DIAST BP <80 MM HG: CPT | Performed by: INTERNAL MEDICINE

## 2023-02-28 RX ORDER — PROPRANOLOL HYDROCHLORIDE 10 MG/1
10 TABLET ORAL 2 TIMES DAILY
Qty: 60 TABLET | Refills: 1 | Status: SHIPPED | OUTPATIENT
Start: 2023-02-28

## 2023-02-28 NOTE — TELEPHONE ENCOUNTER
GI staff:    Please let the patient know her biopsies were benign. She should continue to avoid NSAIDs and should be taking the pantoprazole twice a day every day. If she is out or needs more, please let me know so I can refill it. Because of the persistent ulcerations, I recommend we repeat her upper endoscopy in 2-3 months. Hopefully, if healed at that time we can go down on the medication and stop monitoring.     If she is ok with above, please send to scheduling for EGD with MAC at [address] or [place] [address] for gastric ulcers    Thanks    Jorge Luis Oneil MD  [address] - Gastroenterology  2/28/2023  2:44 PM

## 2023-02-28 NOTE — TELEPHONE ENCOUNTER
Faxed medical records release of information form to Dr Manjit Rodriguez at [facility] requesting pts mammogram/pap results/flu shot record    Confirmation received    2nd attempt sent 02/28/2023

## 2023-03-17 ENCOUNTER — HOSPITAL ENCOUNTER (OUTPATIENT)
Dept: MRI IMAGING | Facility: HOSPITAL | Age: 58
Discharge: HOME | End: 2023-03-17
Attending: INTERNAL MEDICINE
Payer: COMMERCIAL

## 2023-03-17 DIAGNOSIS — R51.9 FREQUENT HEADACHES: ICD-10-CM

## 2023-03-17 DIAGNOSIS — R51.9 HEADACHE, WORSENING: ICD-10-CM

## 2023-03-17 DIAGNOSIS — G43.801 OTHER MIGRAINE WITH STATUS MIGRAINOSUS, NOT INTRACTABLE: ICD-10-CM

## 2023-03-17 PROCEDURE — A9575 INJ GADOTERATE MEGLUMI 0.1ML: HCPCS | Performed by: INTERNAL MEDICINE

## 2023-03-17 PROCEDURE — 70553 MRI BRAIN STEM W/O & W/DYE: CPT | Performed by: INTERNAL MEDICINE

## 2023-03-17 PROCEDURE — 70544 MR ANGIOGRAPHY HEAD W/O DYE: CPT | Performed by: INTERNAL MEDICINE

## 2023-03-17 RX ORDER — GADOTERATE MEGLUMINE 376.9 MG/ML
15 INJECTION INTRAVENOUS
Status: COMPLETED | OUTPATIENT
Start: 2023-03-17 | End: 2023-03-17

## 2023-03-17 RX ADMIN — GADOTERATE MEGLUMINE 15 ML: 376.9 INJECTION INTRAVENOUS at 08:56:00

## 2023-03-17 NOTE — TELEPHONE ENCOUNTER
Patient called back with  [phone] to patient and reviewed note below. Patient verbalized understanding and ok with scheduling procedure     GI Schedulers--    Please schedule for EGD with MAC at [address] or [address] for gastric ulcers in the next 2-3 months. Patient prefers morning appt and on a Friday if possible.

## 2023-03-22 ENCOUNTER — OFFICE VISIT (OUTPATIENT)
Dept: INTERNAL MEDICINE CLINIC | Facility: CLINIC | Age: 58
End: 2023-03-22
Payer: COMMERCIAL

## 2023-03-22 VITALS
DIASTOLIC BLOOD PRESSURE: 70 MMHG | OXYGEN SATURATION: 98 % | BODY MASS INDEX: 29.27 KG/M2 | WEIGHT: 155 LBS | TEMPERATURE: 98 F | HEIGHT: 61 IN | HEART RATE: 76 BPM | SYSTOLIC BLOOD PRESSURE: 130 MMHG

## 2023-03-22 DIAGNOSIS — Z00.00 ANNUAL PHYSICAL EXAM: Primary | ICD-10-CM

## 2023-03-22 DIAGNOSIS — S86.891A RIGHT MEDIAL TIBIAL STRESS SYNDROME, INITIAL ENCOUNTER: ICD-10-CM

## 2023-03-22 DIAGNOSIS — M72.2 PLANTAR FASCIITIS: ICD-10-CM

## 2023-03-22 DIAGNOSIS — I67.1 POSTERIOR COMMUNICATING ARTERY ANEURYSM: ICD-10-CM

## 2023-03-22 DIAGNOSIS — Z12.31 SCREENING MAMMOGRAM FOR BREAST CANCER: ICD-10-CM

## 2023-03-22 PROCEDURE — 99213 OFFICE O/P EST LOW 20 MIN: CPT | Performed by: INTERNAL MEDICINE

## 2023-03-22 PROCEDURE — 3078F DIAST BP <80 MM HG: CPT | Performed by: INTERNAL MEDICINE

## 2023-03-22 PROCEDURE — 99396 PREV VISIT EST AGE 40-64: CPT | Performed by: INTERNAL MEDICINE

## 2023-03-22 PROCEDURE — 3008F BODY MASS INDEX DOCD: CPT | Performed by: INTERNAL MEDICINE

## 2023-03-22 PROCEDURE — 3075F SYST BP GE 130 - 139MM HG: CPT | Performed by: INTERNAL MEDICINE

## 2023-03-22 RX ORDER — CELECOXIB 200 MG/1
200 CAPSULE ORAL 2 TIMES DAILY PRN
Qty: 30 CAPSULE | Refills: 0 | Status: SHIPPED | OUTPATIENT
Start: 2023-03-22

## 2023-03-24 NOTE — TELEPHONE ENCOUNTER
CBLM using language line to schedule pt's procedure. Please transfer to [facility] at ext [phone] for scheduling.

## 2023-03-25 RX ORDER — PROPRANOLOL HYDROCHLORIDE 10 MG/1
TABLET ORAL
Qty: 60 TABLET | Refills: 1 | Status: SHIPPED | OUTPATIENT
Start: 2023-03-25

## 2023-03-28 NOTE — TELEPHONE ENCOUNTER
This is the third attempt to reach this pt in regards to scheduling with NO success scheduling Upper Endoscopy. Letter sent via [employer] and mail. TE closed.

## 2023-04-07 ENCOUNTER — OFFICE VISIT (OUTPATIENT)
Dept: NEUROLOGY | Facility: CLINIC | Age: 58
End: 2023-04-07
Payer: COMMERCIAL

## 2023-04-07 VITALS — WEIGHT: 155 LBS | HEIGHT: 61 IN | BODY MASS INDEX: 29.27 KG/M2 | HEART RATE: 76 BPM

## 2023-04-07 DIAGNOSIS — G43.009 MIGRAINE WITHOUT AURA AND WITHOUT STATUS MIGRAINOSUS, NOT INTRACTABLE: Primary | ICD-10-CM

## 2023-04-07 DIAGNOSIS — H81.12 BPPV (BENIGN PAROXYSMAL POSITIONAL VERTIGO), LEFT: ICD-10-CM

## 2023-04-07 DIAGNOSIS — R51.9 MORNING HEADACHE: ICD-10-CM

## 2023-04-07 PROCEDURE — 3008F BODY MASS INDEX DOCD: CPT | Performed by: OTHER

## 2023-04-07 PROCEDURE — 99205 OFFICE O/P NEW HI 60 MIN: CPT | Performed by: OTHER

## 2023-04-07 RX ORDER — PREDNISONE 20 MG/1
TABLET ORAL
Qty: 24 TABLET | Refills: 0 | Status: SHIPPED | OUTPATIENT
Start: 2023-04-07 | End: 2023-04-19

## 2023-04-07 RX ORDER — SUMATRIPTAN 100 MG/1
TABLET, FILM COATED ORAL
Qty: 9 TABLET | Refills: 11 | Status: SHIPPED | OUTPATIENT
Start: 2023-04-07

## 2023-04-07 RX ORDER — PROPRANOLOL HYDROCHLORIDE 20 MG/1
20 TABLET ORAL 2 TIMES DAILY
Qty: 180 TABLET | Refills: 0 | Status: SHIPPED | OUTPATIENT
Start: 2023-04-07 | End: 2023-07-06

## 2023-04-07 RX ORDER — DIVALPROEX SODIUM 500 MG/1
TABLET, EXTENDED RELEASE ORAL
Qty: 21 TABLET | Refills: 0 | Status: SHIPPED | OUTPATIENT
Start: 2023-04-07 | End: 2023-04-21

## 2023-04-21 ENCOUNTER — HOSPITAL ENCOUNTER (OUTPATIENT)
Dept: GENERAL RADIOLOGY | Facility: HOSPITAL | Age: 58
Discharge: HOME | End: 2023-04-21
Attending: PODIATRIST
Payer: COMMERCIAL

## 2023-04-21 ENCOUNTER — OFFICE VISIT (OUTPATIENT)
Dept: PODIATRY CLINIC | Facility: CLINIC | Age: 58
End: 2023-04-21

## 2023-04-21 DIAGNOSIS — M20.41 ACQUIRED HAMMERTOE OF RIGHT FOOT: Primary | ICD-10-CM

## 2023-04-21 DIAGNOSIS — M77.41 METATARSALGIA, RIGHT FOOT: ICD-10-CM

## 2023-04-21 DIAGNOSIS — M20.41 ACQUIRED HAMMERTOE OF RIGHT FOOT: ICD-10-CM

## 2023-04-21 DIAGNOSIS — M77.51 CAPSULITIS OF METATARSOPHALANGEAL (MTP) JOINT OF RIGHT FOOT: ICD-10-CM

## 2023-04-21 PROCEDURE — 99203 OFFICE O/P NEW LOW 30 MIN: CPT | Performed by: PODIATRIST

## 2023-04-21 PROCEDURE — 73630 X-RAY EXAM OF FOOT: CPT | Performed by: PODIATRIST

## 2023-05-08 RX ORDER — PANTOPRAZOLE SODIUM 40 MG/1
TABLET, DELAYED RELEASE ORAL
Qty: 60 TABLET | Refills: 2 | Status: SHIPPED | OUTPATIENT
Start: 2023-05-08

## 2023-05-08 NOTE — TELEPHONE ENCOUNTER
I've refilled the med    Was outlined back in February to schedule EGD which I don't see scheduled.      Please assist with this    Thanks    Miryam Lu MD  [address] - Gastroenterology  5/8/2023  3:10 PM

## 2023-05-09 ENCOUNTER — TELEPHONE (OUTPATIENT)
Dept: GASTROENTEROLOGY | Facility: CLINIC | Age: 58
End: 2023-05-09

## 2023-05-14 DIAGNOSIS — M81.0 OSTEOPOROSIS, UNSPECIFIED OSTEOPOROSIS TYPE, UNSPECIFIED PATHOLOGICAL FRACTURE PRESENCE: ICD-10-CM

## 2023-05-15 NOTE — TELEPHONE ENCOUNTER
#[id number]    [facility] Been [facility]ops[facility]?: has been previously biopsied

## 2023-05-16 RX ORDER — ALENDRONATE SODIUM 70 MG/1
70 TABLET ORAL WEEKLY
Qty: 4 TABLET | Refills: 0 | Status: SHIPPED | OUTPATIENT
Start: 2023-05-16

## 2023-06-01 ENCOUNTER — TELEPHONE (OUTPATIENT)
Dept: INTERNAL MEDICINE CLINIC | Facility: CLINIC | Age: 58
End: 2023-06-01

## 2023-06-01 DIAGNOSIS — M77.51 CAPSULITIS OF METATARSOPHALANGEAL (MTP) JOINT OF RIGHT FOOT: ICD-10-CM

## 2023-06-01 DIAGNOSIS — M77.41 METATARSALGIA, RIGHT FOOT: ICD-10-CM

## 2023-06-01 DIAGNOSIS — M20.41 ACQUIRED HAMMERTOE OF RIGHT FOOT: Primary | ICD-10-CM

## 2023-06-01 NOTE — TELEPHONE ENCOUNTER
Pt saw Dr. Addison Neal 2/28/23, referred to podiatry. Saw Dr. Taisha Marques 4/21/23.  Referral pended, dx per last visit with Dr. Taisha Marques.

## 2023-06-02 ENCOUNTER — TELEPHONE (OUTPATIENT)
Dept: PODIATRY CLINIC | Facility: CLINIC | Age: 58
End: 2023-06-02

## 2023-06-02 ENCOUNTER — OFFICE VISIT (OUTPATIENT)
Dept: PODIATRY CLINIC | Facility: CLINIC | Age: 58
End: 2023-06-02

## 2023-06-02 DIAGNOSIS — M77.51 CAPSULITIS OF METATARSOPHALANGEAL (MTP) JOINT OF RIGHT FOOT: ICD-10-CM

## 2023-06-02 DIAGNOSIS — M77.41 METATARSALGIA, RIGHT FOOT: ICD-10-CM

## 2023-06-02 DIAGNOSIS — M20.41 ACQUIRED HAMMERTOE OF RIGHT FOOT: Primary | ICD-10-CM

## 2023-06-02 PROCEDURE — 99213 OFFICE O/P EST LOW 20 MIN: CPT | Performed by: PODIATRIST

## 2023-06-02 NOTE — TELEPHONE ENCOUNTER
Patient in clinic today 6/2/23 inquiring about custom orthotics referral request placed 4/21/23 [facility] [address] [id number]     Looks like referral auto authorized-managed care can you please provide an update to status of referral.

## 2023-06-08 DIAGNOSIS — S86.891A RIGHT MEDIAL TIBIAL STRESS SYNDROME, INITIAL ENCOUNTER: ICD-10-CM

## 2023-06-08 DIAGNOSIS — M72.2 PLANTAR FASCIITIS: ICD-10-CM

## 2023-06-11 DIAGNOSIS — M81.0 OSTEOPOROSIS, UNSPECIFIED OSTEOPOROSIS TYPE, UNSPECIFIED PATHOLOGICAL FRACTURE PRESENCE: ICD-10-CM

## 2023-06-12 RX ORDER — CELECOXIB 200 MG/1
200 CAPSULE ORAL 2 TIMES DAILY PRN
Qty: 30 CAPSULE | Refills: 0 | Status: SHIPPED | OUTPATIENT
Start: 2023-06-12

## 2023-06-13 RX ORDER — ALENDRONATE SODIUM 70 MG/1
70 TABLET ORAL WEEKLY
Qty: 4 TABLET | Refills: 0 | Status: SHIPPED | OUTPATIENT
Start: 2023-06-13

## 2023-06-14 ENCOUNTER — OFFICE VISIT (OUTPATIENT)
Dept: PULMONOLOGY | Facility: CLINIC | Age: 58
End: 2023-06-14

## 2023-06-14 VITALS
DIASTOLIC BLOOD PRESSURE: 83 MMHG | OXYGEN SATURATION: 98 % | SYSTOLIC BLOOD PRESSURE: 133 MMHG | HEART RATE: 61 BPM | BODY MASS INDEX: 29 KG/M2 | RESPIRATION RATE: 20 BRPM | WEIGHT: 156 LBS

## 2023-06-14 DIAGNOSIS — G47.33 OSA (OBSTRUCTIVE SLEEP APNEA): Primary | ICD-10-CM

## 2023-06-14 PROCEDURE — 3079F DIAST BP 80-89 MM HG: CPT | Performed by: INTERNAL MEDICINE

## 2023-06-14 PROCEDURE — 3075F SYST BP GE 130 - 139MM HG: CPT | Performed by: INTERNAL MEDICINE

## 2023-06-14 PROCEDURE — 99204 OFFICE O/P NEW MOD 45 MIN: CPT | Performed by: INTERNAL MEDICINE

## 2023-06-22 ENCOUNTER — OFFICE VISIT (OUTPATIENT)
Dept: SLEEP CENTER | Age: 58
End: 2023-06-22
Attending: INTERNAL MEDICINE
Payer: COMMERCIAL

## 2023-06-22 DIAGNOSIS — R51.9 MORNING HEADACHE: ICD-10-CM

## 2023-06-22 DIAGNOSIS — G43.009 MIGRAINE WITHOUT AURA AND WITHOUT STATUS MIGRAINOSUS, NOT INTRACTABLE: ICD-10-CM

## 2023-06-22 PROCEDURE — 95806 SLEEP STUDY UNATT&RESP EFFT: CPT

## 2023-06-23 DIAGNOSIS — G47.33 OSA (OBSTRUCTIVE SLEEP APNEA): Primary | ICD-10-CM

## 2023-06-24 NOTE — PROCEDURES
[address] [facility]  Accredited by the [facility] [employer] [facility] ([facility])    PATIENT'S NAME: Junior Hartmann   ATTENDING PHYSICIAN: Callie Burns MD   REFERRING PHYSICIAN: Dimitris Choi DO   PATIENT ACCOUNT #: [de-identified] LOCATION: [address] #: [id number] YOB: 1965   DATE OF STUDY: 06/22/2023       SLEEP STUDY REPORT    STUDY TYPE:  Home sleep test.    INDICATION:  Suspected obstructive sleep apnea (ICD-10 code G47.33) in patient who has headache, nocturnal awakenings, snoring such that others complain, constant wakening gasping for breath in the middle of the night, an [place] Sleepiness Scale score of 2/24 and a body mass index 29.5. RESULTS:  The patient underwent home sleep test with measurement of her nasal air flow, nasal air pressure, snoring, chest and abdominal wall motion, oximetry, and body position. I have reviewed the entirety of the raw data of this study. During this study, the total recording time is 536 minutes. The lights-out clock time is 10:03 p.m., lights-on clock time is 6:59 a.m. There were 80 obstructive apneic events for an apnea index of 9 events per hour. There were 117 hypopneic events for a hypopnea index of 13.1 events per hour. The combined apnea plus hypopnea index is 22.1 events per hour. There was no significant hypoventilation, Cheyne-Katz breathing, or periodic breathing. The average oxygen saturation is 97%, the lowest oxygen saturation is 81%, the average desaturation is 5%. The oxygen desaturation index is 20.7 events per hour. The patient spent 137 minutes in the supine position, equivalent to 26% of the testing and 397 minutes in the nonsupine position, equivalent to 74% of the testing. The average heart rate is 66 beats per minute. INTERPRETATION:  The data generated from this study is consistent with moderate obstructive sleep apnea (ICD-10 G47.33). RECOMMENDATIONS:    1. CPAP titration.    2. Weight loss. 3.   Avoid alcohol. 4.   Avoid sedating drug. 5.   The patient should not drive if at all sleepy. Please do not hesitate to contact me if there is any question whatsoever regarding interpretation of this study.     Dictated By Odette Byrne MD  d: 06/23/2023 19:27:28  t: 06/23/2023 19:32:04  [facility] [id number]  KIB/    cc: MD Albina Ulloa, DO

## 2023-06-26 ENCOUNTER — TELEPHONE (OUTPATIENT)
Dept: INTERNAL MEDICINE CLINIC | Facility: CLINIC | Age: 58
End: 2023-06-26

## 2023-06-26 ENCOUNTER — TELEPHONE (OUTPATIENT)
Dept: NEUROLOGY | Facility: CLINIC | Age: 58
End: 2023-06-26

## 2023-06-26 DIAGNOSIS — M20.41 ACQUIRED HAMMER TOE OF RIGHT FOOT: Primary | ICD-10-CM

## 2023-06-26 DIAGNOSIS — M77.41 METATARSALGIA OF RIGHT FOOT: ICD-10-CM

## 2023-06-26 DIAGNOSIS — M77.51 CAPSULITIS OF METATARSOPHALANGEAL (MTP) JOINT OF RIGHT FOOT: ICD-10-CM

## 2023-06-26 NOTE — TELEPHONE ENCOUNTER
----- Message from Faheem Patel DO sent at 6/23/2023 10:27 PM CDT -----  Hi,  Please call the patient and let them know that they have sleep apnea. This means that they stop breathing while they are sleeping. They would benefit from having a CPAP mask. They need to go for another study to determine the right settings for the mask. I have placed an order for CPAP titration. Please give him the phone number to call to schedule it. They will need to follow-up with pulmonology if they have any questions. I have ordered a pulmonology referral   If the sleep center tells them they need to contact neurology then please inform the patient that they need to speak to the pulmonologists/lung doctors. Any issues regarding scheduling, questions about CPAP, and neck steps will be dealt with pulmonology.

## 2023-06-28 ENCOUNTER — TELEPHONE (OUTPATIENT)
Dept: INTERNAL MEDICINE CLINIC | Facility: CLINIC | Age: 58
End: 2023-06-28

## 2023-06-28 NOTE — TELEPHONE ENCOUNTER
Pt is calling stating that has been having bodyache for about 2 weeks. Pt states having more pain on hands. Pt also mention is taking tylenol but is not helping.       Please call and advise

## 2023-07-02 DIAGNOSIS — M72.2 PLANTAR FASCIITIS: ICD-10-CM

## 2023-07-02 DIAGNOSIS — G43.009 MIGRAINE WITHOUT AURA AND WITHOUT STATUS MIGRAINOSUS, NOT INTRACTABLE: ICD-10-CM

## 2023-07-02 DIAGNOSIS — S86.891A RIGHT MEDIAL TIBIAL STRESS SYNDROME, INITIAL ENCOUNTER: ICD-10-CM

## 2023-07-03 RX ORDER — CELECOXIB 200 MG/1
200 CAPSULE ORAL 2 TIMES DAILY PRN
Qty: 30 CAPSULE | Refills: 0 | OUTPATIENT
Start: 2023-07-03

## 2023-07-03 NOTE — TELEPHONE ENCOUNTER
LOV 4/7/23   NOV 7/5/23    Refill request for pt  propranolol 20 MG Oral Tab, reviewed by RN and routed to provider for review.

## 2023-07-05 ENCOUNTER — OFFICE VISIT (OUTPATIENT)
Dept: INTERNAL MEDICINE CLINIC | Facility: CLINIC | Age: 58
End: 2023-07-05
Payer: COMMERCIAL

## 2023-07-05 VITALS
WEIGHT: 153 LBS | OXYGEN SATURATION: 98 % | BODY MASS INDEX: 29 KG/M2 | SYSTOLIC BLOOD PRESSURE: 120 MMHG | DIASTOLIC BLOOD PRESSURE: 70 MMHG | HEART RATE: 84 BPM | TEMPERATURE: 98 F

## 2023-07-05 DIAGNOSIS — E78.5 HYPERLIPIDEMIA, UNSPECIFIED HYPERLIPIDEMIA TYPE: ICD-10-CM

## 2023-07-05 DIAGNOSIS — M79.10 MYALGIA: Primary | ICD-10-CM

## 2023-07-05 PROCEDURE — 3078F DIAST BP <80 MM HG: CPT | Performed by: INTERNAL MEDICINE

## 2023-07-05 PROCEDURE — 99214 OFFICE O/P EST MOD 30 MIN: CPT | Performed by: INTERNAL MEDICINE

## 2023-07-05 PROCEDURE — 3074F SYST BP LT 130 MM HG: CPT | Performed by: INTERNAL MEDICINE

## 2023-07-05 RX ORDER — MELOXICAM 15 MG/1
15 TABLET ORAL DAILY
COMMUNITY
Start: 2023-01-11

## 2023-07-06 RX ORDER — PROPRANOLOL HYDROCHLORIDE 20 MG/1
20 TABLET ORAL 2 TIMES DAILY
Qty: 60 TABLET | Refills: 2 | Status: SHIPPED | OUTPATIENT
Start: 2023-07-06

## 2023-07-09 DIAGNOSIS — M81.0 OSTEOPOROSIS, UNSPECIFIED OSTEOPOROSIS TYPE, UNSPECIFIED PATHOLOGICAL FRACTURE PRESENCE: ICD-10-CM

## 2023-07-11 RX ORDER — ALENDRONATE SODIUM 70 MG/1
70 TABLET ORAL WEEKLY
Qty: 4 TABLET | Refills: 0 | Status: SHIPPED | OUTPATIENT
Start: 2023-07-11

## 2023-07-12 DIAGNOSIS — G47.33 OSA (OBSTRUCTIVE SLEEP APNEA): Primary | ICD-10-CM

## 2023-07-12 DIAGNOSIS — G43.009 MIGRAINE WITHOUT AURA AND WITHOUT STATUS MIGRAINOSUS, NOT INTRACTABLE: ICD-10-CM

## 2023-07-13 ENCOUNTER — TELEPHONE (OUTPATIENT)
Dept: NEUROLOGY | Facility: CLINIC | Age: 58
End: 2023-07-13

## 2023-07-13 NOTE — TELEPHONE ENCOUNTER
----- Message from [employer] DO Sarath sent at 7/12/2023  4:30 PM CDT -----  Hi,  Please call the patient and let them know that they have sleep apnea. This means that they stop breathing while they are sleeping. They would benefit from having a CPAP mask. They need to go for another study to determine the right settings for the mask. I have placed an order for CPAP titration. Please give him the phone number to call to schedule it. They will need to follow-up with pulmonology if they have any questions. I have ordered a pulmonology referral   If the sleep center tells them they need to contact neurology then please inform the patient that they need to speak to the pulmonologists/lung doctors. Any issues regarding scheduling, questions about CPAP, and neck steps will be dealt with pulmonology.

## 2023-08-01 RX ORDER — PANTOPRAZOLE SODIUM 40 MG/1
40 TABLET, DELAYED RELEASE ORAL
Qty: 60 TABLET | Refills: 2 | Status: SHIPPED | OUTPATIENT
Start: 2023-08-01

## 2023-08-01 NOTE — TELEPHONE ENCOUNTER
Requested Prescriptions     Pending Prescriptions Disp Refills    PANTOPRAZOLE 40 MG Oral Tab EC [Pharmacy Med Name: PANTOPRAZOLE SOD DR 40 MG TAB] 60 tablet 2     Sig: TAKE 1 TABLET BY MOUTH TWICE A DAY BEFORE MEALS        LOV  10/20/2022    LR   5/8/2023    Follow up - 10/23/2023    sb

## 2023-08-11 ENCOUNTER — OFFICE VISIT (OUTPATIENT)
Dept: SLEEP CENTER | Age: 58
End: 2023-08-11
Attending: Other
Payer: COMMERCIAL

## 2023-08-11 DIAGNOSIS — M81.0 OSTEOPOROSIS, UNSPECIFIED OSTEOPOROSIS TYPE, UNSPECIFIED PATHOLOGICAL FRACTURE PRESENCE: ICD-10-CM

## 2023-08-11 DIAGNOSIS — G47.33 OSA (OBSTRUCTIVE SLEEP APNEA): ICD-10-CM

## 2023-08-11 PROCEDURE — 95811 POLYSOM 6/>YRS CPAP 4/> PARM: CPT

## 2023-08-11 RX ORDER — ALENDRONATE SODIUM 70 MG/1
70 TABLET ORAL WEEKLY
Qty: 4 TABLET | Refills: 0 | OUTPATIENT
Start: 2023-08-11

## 2023-09-01 ENCOUNTER — TELEPHONE (OUTPATIENT)
Dept: PULMONOLOGY | Facility: CLINIC | Age: 58
End: 2023-09-01

## 2023-09-01 DIAGNOSIS — G47.33 OSA (OBSTRUCTIVE SLEEP APNEA): Primary | ICD-10-CM

## 2023-09-01 NOTE — TELEPHONE ENCOUNTER
Spoke with patient's daughter, Kiara Sánchez states she would like Dr. Tahmina Landon to order patient's CPAP machine, daughter aware Dr. Tahmina Landon not in office until next week, verbalized understanding.     Dr. Tahmina Landon - Please review/sign pended CPAP order per CPAP titration results

## 2023-09-08 NOTE — TELEPHONE ENCOUNTER
CPAP order, face sheet, home sleep study, CPAP titration & office visit notes faxed to [facility] at [phone]. Fax confirmation received. [facility] message sent to patient with HME's phone number and to return to clinic 1-3 months of starting CPAP therapy.

## 2023-09-13 ENCOUNTER — OFFICE VISIT (OUTPATIENT)
Dept: PULMONOLOGY | Facility: CLINIC | Age: 58
End: 2023-09-13

## 2023-09-13 VITALS
BODY MASS INDEX: 29.07 KG/M2 | WEIGHT: 154 LBS | RESPIRATION RATE: 14 BRPM | DIASTOLIC BLOOD PRESSURE: 77 MMHG | OXYGEN SATURATION: 97 % | SYSTOLIC BLOOD PRESSURE: 128 MMHG | HEIGHT: 61 IN | HEART RATE: 66 BPM

## 2023-09-13 DIAGNOSIS — G47.33 OSA ON CPAP: Primary | ICD-10-CM

## 2023-09-13 PROCEDURE — 99213 OFFICE O/P EST LOW 20 MIN: CPT | Performed by: INTERNAL MEDICINE

## 2023-09-13 PROCEDURE — 3078F DIAST BP <80 MM HG: CPT | Performed by: INTERNAL MEDICINE

## 2023-09-13 PROCEDURE — 3008F BODY MASS INDEX DOCD: CPT | Performed by: INTERNAL MEDICINE

## 2023-09-13 PROCEDURE — 3074F SYST BP LT 130 MM HG: CPT | Performed by: INTERNAL MEDICINE

## 2023-09-27 ENCOUNTER — TELEPHONE (OUTPATIENT)
Dept: PULMONOLOGY | Facility: CLINIC | Age: 58
End: 2023-09-27

## 2023-09-27 NOTE — TELEPHONE ENCOUNTER
Kayley Santizo daughter states pt got a referral from Dr. Franky Webster to make an appt but the number is invalid please follow up

## 2023-10-04 ENCOUNTER — TELEPHONE (OUTPATIENT)
Dept: PULMONOLOGY | Facility: CLINIC | Age: 58
End: 2023-10-04

## 2023-10-04 ENCOUNTER — OFFICE VISIT (OUTPATIENT)
Dept: INTERNAL MEDICINE CLINIC | Facility: CLINIC | Age: 58
End: 2023-10-04
Payer: COMMERCIAL

## 2023-10-04 VITALS
SYSTOLIC BLOOD PRESSURE: 110 MMHG | TEMPERATURE: 99 F | DIASTOLIC BLOOD PRESSURE: 70 MMHG | BODY MASS INDEX: 29 KG/M2 | OXYGEN SATURATION: 98 % | HEART RATE: 69 BPM | WEIGHT: 152 LBS

## 2023-10-04 DIAGNOSIS — E78.5 HYPERLIPIDEMIA, UNSPECIFIED HYPERLIPIDEMIA TYPE: Primary | ICD-10-CM

## 2023-10-04 DIAGNOSIS — Z12.31 ENCOUNTER FOR SCREENING MAMMOGRAM FOR BREAST CANCER: ICD-10-CM

## 2023-10-04 PROCEDURE — 3074F SYST BP LT 130 MM HG: CPT | Performed by: INTERNAL MEDICINE

## 2023-10-04 PROCEDURE — 3078F DIAST BP <80 MM HG: CPT | Performed by: INTERNAL MEDICINE

## 2023-10-04 PROCEDURE — 90686 IIV4 VACC NO PRSV 0.5 ML IM: CPT | Performed by: INTERNAL MEDICINE

## 2023-10-04 PROCEDURE — 99214 OFFICE O/P EST MOD 30 MIN: CPT | Performed by: INTERNAL MEDICINE

## 2023-10-04 NOTE — TELEPHONE ENCOUNTER
Detailed written order for CPAP supplies received from [facility] and placed in Dr. Odilon Greenfield folder for signature.

## 2023-10-06 ENCOUNTER — LAB ENCOUNTER (OUTPATIENT)
Dept: LAB | Age: 58
End: 2023-10-06
Attending: INTERNAL MEDICINE
Payer: COMMERCIAL

## 2023-10-06 DIAGNOSIS — E78.5 HYPERLIPIDEMIA, UNSPECIFIED HYPERLIPIDEMIA TYPE: ICD-10-CM

## 2023-10-06 LAB
CHOLEST SERPL-MCNC: 296 MG/DL (ref ?–200)
FASTING PATIENT LIPID ANSWER: YES
HDLC SERPL-MCNC: 58 MG/DL (ref 40–59)
LDLC SERPL CALC-MCNC: 212 MG/DL (ref ?–100)
NONHDLC SERPL-MCNC: 238 MG/DL (ref ?–130)
TRIGL SERPL-MCNC: 143 MG/DL (ref 30–149)
VLDLC SERPL CALC-MCNC: 31 MG/DL (ref 0–30)

## 2023-10-06 PROCEDURE — 36415 COLL VENOUS BLD VENIPUNCTURE: CPT

## 2023-10-06 PROCEDURE — 80061 LIPID PANEL: CPT

## 2023-10-13 ENCOUNTER — ANESTHESIA (OUTPATIENT)
Dept: ENDOSCOPY | Age: 58
End: 2023-10-13
Payer: COMMERCIAL

## 2023-10-13 ENCOUNTER — HOSPITAL ENCOUNTER (OUTPATIENT)
Age: 58
Setting detail: HOSPITAL OUTPATIENT SURGERY
Discharge: HOME | End: 2023-10-13
Attending: INTERNAL MEDICINE | Admitting: INTERNAL MEDICINE
Payer: COMMERCIAL

## 2023-10-13 ENCOUNTER — ANESTHESIA EVENT (OUTPATIENT)
Dept: ENDOSCOPY | Age: 58
End: 2023-10-13
Payer: COMMERCIAL

## 2023-10-13 VITALS
HEART RATE: 58 BPM | BODY MASS INDEX: 29.07 KG/M2 | HEIGHT: 61 IN | RESPIRATION RATE: 14 BRPM | OXYGEN SATURATION: 99 % | WEIGHT: 154 LBS | SYSTOLIC BLOOD PRESSURE: 143 MMHG | DIASTOLIC BLOOD PRESSURE: 75 MMHG

## 2023-10-13 DIAGNOSIS — K25.9 GASTRIC ULCER, UNSPECIFIED CHRONICITY, UNSPECIFIED WHETHER GASTRIC ULCER HEMORRHAGE OR PERFORATION PRESENT: ICD-10-CM

## 2023-10-13 PROBLEM — Z87.11 HISTORY OF GASTRIC ULCER: Status: ACTIVE | Noted: 2023-10-13

## 2023-10-13 PROCEDURE — 88312 SPECIAL STAINS GROUP 1: CPT | Performed by: INTERNAL MEDICINE

## 2023-10-13 PROCEDURE — 88305 TISSUE EXAM BY PATHOLOGIST: CPT | Performed by: INTERNAL MEDICINE

## 2023-10-13 RX ORDER — SODIUM CHLORIDE, SODIUM LACTATE, POTASSIUM CHLORIDE, CALCIUM CHLORIDE 600; 310; 30; 20 MG/100ML; MG/100ML; MG/100ML; MG/100ML
INJECTION, SOLUTION INTRAVENOUS CONTINUOUS
Status: DISCONTINUED | OUTPATIENT
Start: 2023-10-13 | End: 2023-10-13

## 2023-10-13 RX ORDER — LIDOCAINE HYDROCHLORIDE 10 MG/ML
INJECTION, SOLUTION EPIDURAL; INFILTRATION; INTRACAUDAL; PERINEURAL AS NEEDED
Status: DISCONTINUED | OUTPATIENT
Start: 2023-10-13 | End: 2023-10-13 | Stop reason: SURG

## 2023-10-13 RX ADMIN — LIDOCAINE HYDROCHLORIDE 100 MG: 10 INJECTION, SOLUTION EPIDURAL; INFILTRATION; INTRACAUDAL; PERINEURAL at 10:18:00

## 2023-10-13 RX ADMIN — SODIUM CHLORIDE, SODIUM LACTATE, POTASSIUM CHLORIDE, CALCIUM CHLORIDE: 600; 310; 30; 20 INJECTION, SOLUTION INTRAVENOUS at 10:15:00

## 2023-10-13 NOTE — H&P
History & Physical Examination    Patient Name: Estephania Pizarro  MRN: [id number]  [facility]: [id number]  YOB: 1965    Diagnosis: gastric ulcer     History of hospitalization in December 2022 for abdominal pain found to have a sub-centimeter gastric ulcer on EGD. EGD in February 2023 showed multiple superficial serpiginous and linear ulcerations in the gastric antrum. pantoprazole 40 MG Oral Tab EC, Take 1 tablet (40 mg total) by mouth 2 (two) times daily before meals. , Disp: 60 tablet, Rfl: 2, 10/12/2023 at 1800  alendronate 70 MG Oral Tab, Take 1 tablet (70 mg total) by mouth once a week., Disp: 4 tablet, Rfl: 0  SUMAtriptan Succinate 100 MG Oral Tab, Use at onset; repeat once after 2 HRS-ONLY 2 IN 24 HR MAX. This is a 30 day supply. , Disp: 9 tablet, Rfl: 11,  at prn  propranolol 20 MG Oral Tab, Take 1 tablet (20 mg total) by mouth 2 (two) times daily. (Patient not taking: Reported on 10/6/2023), Disp: 60 tablet, Rfl: 2, Not Taking      lactated ringers infusion, , Intravenous, Continuous        Allergies:   No Known Allergies      MYALGIA    Past Medical History:   Diagnosis Date    Colon adenomas 01/03/2018    repeat colonoscopy 1/2021    High cholesterol     Hyperlipidemia     Migraine     LULU (obstructive sleep apnea) 06/23/2023    Sleep apnea      Past Surgical History:   Procedure Laterality Date    COLONOSCOPY N/A 3/23/2021    Procedure: COLONOSCOPY;  Surgeon: Mahamed Rondon MD;  Location: [facility]     Family History   Problem Relation Age of Onset    Cancer Father      Social History    Tobacco Use      Smoking status: Never      Smokeless tobacco: Never    Alcohol use: No      Alcohol/week: 0.0 standard drinks of alcohol      SYSTEM Check if Physical Exam is Normal If not normal, please explain:   HECARLOS [ Denzel Hussein  [ Isaiah  [ Baljitda Lisaran [ Mary Gaspar [ Ryan Simon [ Deann Raygozaer      I have discussed the risks and benefits and alternatives of the procedure with the patient/family. They understand and agree to proceed with plan of care. I have reviewed the History and Physical done within the last 30 days. Any changes noted above.   Armando Correia MD  [facility] - Gastroenterology  10/13/2023  10:13 AM

## 2023-10-13 NOTE — OPERATIVE REPORT
Esophagogastroduodenoscopy (EGD) Report    Karrie Oliveira     1965 Age 60year old   PCP Afia Lyles MD Endoscopist Andres Friedman MD     Date of procedure: 10/13/23    Procedure: EGD w/ biopsy     Pre-operative diagnosis:  gastric ulcer     History of hospitalization in 2022 for abdominal pain found to have a sub-centimeter gastric ulcer on EGD. EGD in 2023 showed multiple superficial serpiginous and linear ulcerations in the gastric antrum. Post-operative diagnosis: see impression    Sedation:  monitored anesthesia care (MAC)    Consent: We discussed the risks/benefits and alternatives to this procedure, as well as the planned sedation. Informed consent was obtained from the patient after the risks of the procedure were discussed, including but not limited to bleeding, perforation, aspiration, infection, or possibility of a missed lesion as well as the risks of anesthesia including but not limited to cardiopulmonary complications. The patient signed informed consent and elected to proceed with EGD with intervention [i.e. Biopsy, control of bleeding, dilatation, polypectomy, endoscopic mucosal resection, etc.] as indicated. EGD procedure: The patient was placed in the left lateral decubitus position and begun on continuous blood pressure pulse oximetry and EKG monitoring and this was maintained throughout the procedure. Once an adequate level of sedation was obtained a bite block was placed. Then the lubricated tip of the [id number] diagnostic video upper endoscope was carefully inserted and advanced using direct visualization into the posterior pharynx and ultimately into the esophagus, stomach, and duodenum. Air was then withdrawn and the endoscope was removed. The patient tolerated the procedure well. Estimated blood loss: insignificant    Specimens collected:  gastric biopsies    Complications: none    EGD findings:      1. Esophagus:  The squamocolumnar junction was noted at 35 cm and appeared regular. The esophageal mucosa appeared unremarkable. 2. Stomach: The stomach distended normally. Normal rugal folds were seen. The pylorus was patent. There was mild to moderate erythema in the antrum with some mild erosions noted. Multiple cold forceps biopsies were obtained. The gastric mucosa appeared unremarkable otherwise. Retroflexion revealed a normal fundus and cardia. 3. Duodenum: The duodenal mucosa appeared normal in the 1st and 2nd portion of the duodenum. Impression:  Mild to moderate gastric erythema with some erosions in the gastric antrum overall improved in appearance from prior endoscopic evaluations  Otherwise, unremarkable upper endoscopy    Recommend:  Await pathology results  Decrease proton pump inhibitor to once daily  Continue to avoid NSAIDs  Follow up in GI clinic on a routine basis    >>>If biopsies were performed and you have not received your pathology results either by phone or letter within 2 weeks, please call our office at [phone].     Krishan Crandall MD  [address] - Gastroenterology  10/13/2023

## 2023-10-13 NOTE — ANESTHESIA POSTPROCEDURE EVALUATION
Patient: Linda Markham    Procedure Summary       Date: 10/13/23 Room / Location: [facility] ENDOSCOPY 01 / [facility] ENDO    Anesthesia Start: 5710 Anesthesia Stop: 1024    Procedure: ESOPHAGOGASTRODUODENOSCOPY Diagnosis:       Gastric ulcer, unspecified chronicity, unspecified whether gastric ulcer hemorrhage or perforation present      (gastric erythema and erosions)    Surgeons: Razia Catherine MD Anesthesiologist: Abram Oneill MD    Anesthesia Type: MAC ASA Status: 2            Anesthesia Type: MAC    Vitals Value Taken Time   /79 10/13/23 1024   Temp  10/13/23 1025   Pulse 66 10/13/23 1024   Resp 14 10/13/23 1024   SpO2 96 % 10/13/23 1024       [facility] AN Post Evaluation:   Patient Evaluated in PACU  Patient Participation: waiting for patient participation  Pain Management: adequate  Airway Patency:patent  Yes    Cardiovascular Status: acceptable  Respiratory Status: acceptable and room air  Postoperative Hydration acceptable      Michael Abrams MD  10/13/2023 10:25 AM

## 2023-10-16 ENCOUNTER — TELEPHONE (OUTPATIENT)
Dept: INTERNAL MEDICINE CLINIC | Facility: CLINIC | Age: 58
End: 2023-10-16

## 2023-10-16 RX ORDER — EZETIMIBE 10 MG/1
10 TABLET ORAL NIGHTLY
Qty: 90 TABLET | Refills: 0 | Status: SHIPPED | OUTPATIENT
Start: 2023-10-16

## 2023-10-16 RX ORDER — PRAVASTATIN SODIUM 10 MG
10 TABLET ORAL NIGHTLY
Qty: 90 TABLET | Refills: 0 | Status: SHIPPED | OUTPATIENT
Start: 2023-10-16

## 2023-10-23 NOTE — TELEPHONE ENCOUNTER
Patient called back and results were discussed, she picked up the medications and is willing to start them she was asked to start with one at a time to see if she has no side effects,she verbalize understanding.

## 2023-10-23 NOTE — TELEPHONE ENCOUNTER
Jaquelin Kirk MD sent to [facility]  Please inform her that LDL is elevated significantly. Above 200. I recommend restarting statin. See if she agree to trial a low dose statin such as pravastatin 10 mg and ezetimibe 10mg.   She was intolerant of high dose statins in the past.  Repeast lipi panel in 3 months      Left message to call back, does she wants to try cholesterol med????

## 2023-11-11 ENCOUNTER — APPOINTMENT (OUTPATIENT)
Dept: ULTRASOUND IMAGING | Facility: HOSPITAL | Age: 58
End: 2023-11-11
Attending: NURSE PRACTITIONER
Payer: COMMERCIAL

## 2023-11-11 ENCOUNTER — HOSPITAL ENCOUNTER (EMERGENCY)
Facility: HOSPITAL | Age: 58
Discharge: HOME | End: 2023-11-11
Payer: COMMERCIAL

## 2023-11-11 ENCOUNTER — APPOINTMENT (OUTPATIENT)
Dept: CT IMAGING | Facility: HOSPITAL | Age: 58
End: 2023-11-11
Attending: NURSE PRACTITIONER
Payer: COMMERCIAL

## 2023-11-11 VITALS
OXYGEN SATURATION: 96 % | SYSTOLIC BLOOD PRESSURE: 119 MMHG | TEMPERATURE: 99 F | WEIGHT: 154.13 LBS | HEART RATE: 71 BPM | BODY MASS INDEX: 29 KG/M2 | RESPIRATION RATE: 18 BRPM | DIASTOLIC BLOOD PRESSURE: 70 MMHG

## 2023-11-11 DIAGNOSIS — R11.2 NAUSEA VOMITING AND DIARRHEA: Primary | ICD-10-CM

## 2023-11-11 DIAGNOSIS — R19.7 NAUSEA VOMITING AND DIARRHEA: Primary | ICD-10-CM

## 2023-11-11 DIAGNOSIS — R10.9 ABDOMINAL PAIN, ACUTE: ICD-10-CM

## 2023-11-11 LAB
ALBUMIN SERPL-MCNC: 4.7 G/DL (ref 3.2–4.8)
ALBUMIN/GLOB SERPL: 1.8 {RATIO} (ref 1–2)
ALP LIVER SERPL-CCNC: 111 U/L
ALT SERPL-CCNC: 18 U/L
ANION GAP SERPL CALC-SCNC: 13 MMOL/L (ref 0–18)
AST SERPL-CCNC: 22 U/L (ref ?–34)
BASOPHILS # BLD AUTO: 0.04 X10(3) UL (ref 0–0.2)
BASOPHILS NFR BLD AUTO: 0.3 %
BILIRUB SERPL-MCNC: 0.7 MG/DL (ref 0.3–1.2)
BILIRUB UR QL: NEGATIVE
BUN BLD-MCNC: 13 MG/DL (ref 9–23)
BUN/CREAT SERPL: 18.6 (ref 10–20)
CALCIUM BLD-MCNC: 9.8 MG/DL (ref 8.7–10.4)
CHLORIDE SERPL-SCNC: 106 MMOL/L (ref 98–112)
CLARITY UR: CLEAR
CO2 SERPL-SCNC: 21 MMOL/L (ref 21–32)
COLOR UR: YELLOW
CREAT BLD-MCNC: 0.7 MG/DL
DEPRECATED RDW RBC AUTO: 43.2 FL (ref 35.1–46.3)
EGFRCR SERPLBLD CKD-EPI 2021: 100 ML/MIN/1.73M2 (ref 60–?)
EOSINOPHIL # BLD AUTO: 0.14 X10(3) UL (ref 0–0.7)
EOSINOPHIL NFR BLD AUTO: 0.9 %
ERYTHROCYTE [DISTWIDTH] IN BLOOD BY AUTOMATED COUNT: 13.2 % (ref 11–15)
GLOBULIN PLAS-MCNC: 2.6 G/DL (ref 2.8–4.4)
GLUCOSE BLD-MCNC: 118 MG/DL (ref 70–99)
GLUCOSE UR-MCNC: NORMAL MG/DL
HCT VFR BLD AUTO: 43.3 %
HGB BLD-MCNC: 14.6 G/DL
IMM GRANULOCYTES # BLD AUTO: 0.05 X10(3) UL (ref 0–1)
IMM GRANULOCYTES NFR BLD: 0.3 %
KETONES UR-MCNC: 10 MG/DL
LEUKOCYTE ESTERASE UR QL STRIP.AUTO: NEGATIVE
LIPASE SERPL-CCNC: 35 U/L (ref 13–75)
LYMPHOCYTES # BLD AUTO: 0.8 X10(3) UL (ref 1–4)
LYMPHOCYTES NFR BLD AUTO: 5.2 %
MCH RBC QN AUTO: 29.9 PG (ref 26–34)
MCHC RBC AUTO-ENTMCNC: 33.7 G/DL (ref 31–37)
MCV RBC AUTO: 88.7 FL
MONOCYTES # BLD AUTO: 1.12 X10(3) UL (ref 0.1–1)
MONOCYTES NFR BLD AUTO: 7.3 %
NEUTROPHILS # BLD AUTO: 13.09 X10 (3) UL (ref 1.5–7.7)
NEUTROPHILS # BLD AUTO: 13.09 X10(3) UL (ref 1.5–7.7)
NEUTROPHILS NFR BLD AUTO: 86 %
NITRITE UR QL STRIP.AUTO: NEGATIVE
OSMOLALITY SERPL CALC.SUM OF ELEC: 291 MOSM/KG (ref 275–295)
PH UR: 5 [PH] (ref 5–8)
PLATELET # BLD AUTO: 306 10(3)UL (ref 150–450)
POTASSIUM SERPL-SCNC: 3.7 MMOL/L (ref 3.5–5.1)
PROT SERPL-MCNC: 7.3 G/DL (ref 5.7–8.2)
PROT UR-MCNC: 20 MG/DL
RBC # BLD AUTO: 4.88 X10(6)UL
SODIUM SERPL-SCNC: 140 MMOL/L (ref 136–145)
SP GR UR STRIP: 1.02 (ref 1–1.03)
UROBILINOGEN UR STRIP-ACNC: NORMAL
WBC # BLD AUTO: 15.2 X10(3) UL (ref 4–11)

## 2023-11-11 PROCEDURE — 99285 EMERGENCY DEPT VISIT HI MDM: CPT

## 2023-11-11 PROCEDURE — 80053 COMPREHEN METABOLIC PANEL: CPT | Performed by: NURSE PRACTITIONER

## 2023-11-11 PROCEDURE — 74177 CT ABD & PELVIS W/CONTRAST: CPT | Performed by: NURSE PRACTITIONER

## 2023-11-11 PROCEDURE — 96375 TX/PRO/DX INJ NEW DRUG ADDON: CPT

## 2023-11-11 PROCEDURE — 96361 HYDRATE IV INFUSION ADD-ON: CPT

## 2023-11-11 PROCEDURE — 76705 ECHO EXAM OF ABDOMEN: CPT | Performed by: NURSE PRACTITIONER

## 2023-11-11 PROCEDURE — 83690 ASSAY OF LIPASE: CPT | Performed by: NURSE PRACTITIONER

## 2023-11-11 PROCEDURE — 96376 TX/PRO/DX INJ SAME DRUG ADON: CPT

## 2023-11-11 PROCEDURE — 85025 COMPLETE CBC W/AUTO DIFF WBC: CPT | Performed by: NURSE PRACTITIONER

## 2023-11-11 PROCEDURE — 99284 EMERGENCY DEPT VISIT MOD MDM: CPT

## 2023-11-11 PROCEDURE — 81001 URINALYSIS AUTO W/SCOPE: CPT | Performed by: NURSE PRACTITIONER

## 2023-11-11 PROCEDURE — 96374 THER/PROPH/DIAG INJ IV PUSH: CPT

## 2023-11-11 RX ORDER — ONDANSETRON 2 MG/ML
4 INJECTION INTRAMUSCULAR; INTRAVENOUS ONCE
Status: COMPLETED | OUTPATIENT
Start: 2023-11-11 | End: 2023-11-11

## 2023-11-11 RX ORDER — DICYCLOMINE HCL 20 MG
20 TABLET ORAL 4 TIMES DAILY PRN
Qty: 30 TABLET | Refills: 0 | Status: SHIPPED | OUTPATIENT
Start: 2023-11-11 | End: 2023-12-11

## 2023-11-11 RX ORDER — ONDANSETRON 4 MG/1
4 TABLET, ORALLY DISINTEGRATING ORAL EVERY 4 HOURS PRN
Qty: 10 TABLET | Refills: 0 | Status: SHIPPED | OUTPATIENT
Start: 2023-11-11 | End: 2023-11-18

## 2023-11-11 RX ORDER — MORPHINE SULFATE 4 MG/ML
4 INJECTION, SOLUTION INTRAMUSCULAR; INTRAVENOUS ONCE
Status: COMPLETED | OUTPATIENT
Start: 2023-11-11 | End: 2023-11-11

## 2023-11-15 ENCOUNTER — PATIENT OUTREACH (OUTPATIENT)
Dept: INTERNAL MEDICINE CLINIC | Facility: CLINIC | Age: 58
End: 2023-11-15

## 2023-11-17 ENCOUNTER — TELEPHONE (OUTPATIENT)
Dept: INTERNAL MEDICINE CLINIC | Facility: CLINIC | Age: 58
End: 2023-11-17

## 2023-11-17 NOTE — TELEPHONE ENCOUNTER
Pt is calling stating that she went to er on 11/11 and was told needs to follow up with pcp.       Please call and advise

## 2023-11-20 NOTE — TELEPHONE ENCOUNTER
Patient is calling and asking if she needs to be seen after hospital visit? She can't come in soon. She has to work Monday thru Friday and gets out late. She wants to know if maybe she can come after some tests she has scheduled in December?

## 2023-12-11 RX ORDER — PANTOPRAZOLE SODIUM 40 MG/1
40 TABLET, DELAYED RELEASE ORAL
Qty: 90 TABLET | Refills: 1 | Status: SHIPPED | OUTPATIENT
Start: 2023-12-11

## 2023-12-11 NOTE — TELEPHONE ENCOUNTER
Requested Prescriptions     Pending Prescriptions Disp Refills    pantoprazole 40 MG Oral Tab EC 60 tablet 2     Sig: Take 1 tablet (40 mg total) by mouth 2 (two) times daily before meals.      LOV: 12/20/2022  Last Refill: 08/01/2023

## 2023-12-12 RX ORDER — EZETIMIBE 10 MG/1
10 TABLET ORAL NIGHTLY
Qty: 90 TABLET | Refills: 0 | Status: SHIPPED | OUTPATIENT
Start: 2023-12-12

## 2023-12-13 ENCOUNTER — OFFICE VISIT (OUTPATIENT)
Dept: PULMONOLOGY | Facility: CLINIC | Age: 58
End: 2023-12-13

## 2023-12-13 VITALS
RESPIRATION RATE: 14 BRPM | HEIGHT: 61 IN | HEART RATE: 73 BPM | OXYGEN SATURATION: 98 % | DIASTOLIC BLOOD PRESSURE: 85 MMHG | WEIGHT: 151 LBS | SYSTOLIC BLOOD PRESSURE: 132 MMHG | BODY MASS INDEX: 28.51 KG/M2

## 2023-12-13 DIAGNOSIS — G47.33 OSA ON CPAP: Primary | ICD-10-CM

## 2023-12-13 PROCEDURE — 3075F SYST BP GE 130 - 139MM HG: CPT | Performed by: INTERNAL MEDICINE

## 2023-12-13 PROCEDURE — 99214 OFFICE O/P EST MOD 30 MIN: CPT | Performed by: INTERNAL MEDICINE

## 2023-12-13 PROCEDURE — 3079F DIAST BP 80-89 MM HG: CPT | Performed by: INTERNAL MEDICINE

## 2023-12-13 PROCEDURE — 3008F BODY MASS INDEX DOCD: CPT | Performed by: INTERNAL MEDICINE

## 2023-12-13 NOTE — PROGRESS NOTES
Subjective:   Patient ID: Jarvis Foss is a 60year old female. HPI    Tolerated CPAP with a full facemask more than nasal pillow  Trying to use it every night but sometimes takes it in 2-3 hours   Feeling better with more energy and less fatigue  No sinus pressure or nasal congestion  Regular hours of sleep  Otherwise denies any chest pain or shortness of breath or cough    I utilized  in visit today  History/Other:   Review of Systems   Constitutional: Negative. HENT: Negative. Respiratory: Negative. Cardiovascular: Negative. Gastrointestinal: Negative. Neurological: Negative. Psychiatric/Behavioral: Negative. Current Outpatient Medications   Medication Sig Dispense Refill    ezetimibe 10 MG Oral Tab Take 1 tablet (10 mg total) by mouth nightly. 90 tablet 0    pantoprazole 40 MG Oral Tab EC Take 1 tablet (40 mg total) by mouth every morning before breakfast. 90 tablet 1    pravastatin 10 MG Oral Tab Take 1 tablet (10 mg total) by mouth nightly. 90 tablet 0    alendronate 70 MG Oral Tab Take 1 tablet (70 mg total) by mouth once a week. 4 tablet 0    propranolol 20 MG Oral Tab Take 1 tablet (20 mg total) by mouth 2 (two) times daily. 60 tablet 2    SUMAtriptan Succinate 100 MG Oral Tab Use at onset; repeat once after 2 HRS-ONLY 2 IN 24 HR MAX. This is a 30 day supply. 9 tablet 11     Allergies: Allergies   Allergen Reactions    No Known Allergies MYALGIA       Objective:   Physical Exam  Constitutional:       General: She is not in acute distress. Appearance: Normal appearance. HENT:      Head: Normocephalic and atraumatic. Mouth/Throat:      Mouth: Mucous membranes are moist.   Eyes:      General: No scleral icterus. Cardiovascular:      Rate and Rhythm: Normal rate. Pulses: Normal pulses. Pulmonary:      Effort: Pulmonary effort is normal. No respiratory distress. Breath sounds: No stridor. No wheezing, rhonchi or rales.    Abdominal: General: Abdomen is flat. Bowel sounds are normal.      Palpations: Abdomen is soft. Musculoskeletal:      Right lower leg: No edema. Left lower leg: No edema. Skin:     General: Skin is dry. Neurological:      Mental Status: She is oriented to person, place, and time. Assessment & Plan:   1.  LULU on CPAP      1- Moderate  LULU required CPAP at 6 CWP  H/o  HTN, HL    Clinically doing well and feeling better with the CPAP  Tolerating the therapy  Trying to use the mask every night with a compliance of 80% but adherence around 40%  Still with borderline AHI but overall feeling better    Plan :  Encouraged the patient to use CPAP every night and all night  Continue with  CPAP at 6 CWP     Diet and exercise and weight reduction  Avoid sedative and narcotic and alcohol  Avoid driving or operating heavy machinery at any time if sleepy     F/u in 1 year     Meds This Visit:  Requested Prescriptions      No prescriptions requested or ordered in this encounter       Imaging & Referrals:  None

## 2023-12-15 ENCOUNTER — HOSPITAL ENCOUNTER (OUTPATIENT)
Dept: BONE DENSITY | Age: 58
Discharge: HOME | End: 2023-12-15
Attending: INTERNAL MEDICINE
Payer: COMMERCIAL

## 2023-12-15 ENCOUNTER — HOSPITAL ENCOUNTER (OUTPATIENT)
Dept: MAMMOGRAPHY | Age: 58
Discharge: HOME | End: 2023-12-15
Attending: INTERNAL MEDICINE
Payer: COMMERCIAL

## 2023-12-15 DIAGNOSIS — Z12.31 ENCOUNTER FOR SCREENING MAMMOGRAM FOR BREAST CANCER: ICD-10-CM

## 2023-12-15 DIAGNOSIS — M81.0 OSTEOPOROSIS, UNSPECIFIED OSTEOPOROSIS TYPE, UNSPECIFIED PATHOLOGICAL FRACTURE PRESENCE: ICD-10-CM

## 2023-12-15 PROCEDURE — 77063 BREAST TOMOSYNTHESIS BI: CPT | Performed by: INTERNAL MEDICINE

## 2023-12-15 PROCEDURE — 77080 DXA BONE DENSITY AXIAL: CPT | Performed by: INTERNAL MEDICINE

## 2023-12-15 PROCEDURE — 77067 SCR MAMMO BI INCL CAD: CPT | Performed by: INTERNAL MEDICINE

## 2023-12-29 ENCOUNTER — OFFICE VISIT (OUTPATIENT)
Dept: NEUROLOGY | Facility: CLINIC | Age: 58
End: 2023-12-29
Payer: COMMERCIAL

## 2023-12-29 VITALS — BODY MASS INDEX: 28.51 KG/M2 | WEIGHT: 151 LBS | HEIGHT: 61 IN

## 2023-12-29 DIAGNOSIS — G43.009 MIGRAINE WITHOUT AURA AND WITHOUT STATUS MIGRAINOSUS, NOT INTRACTABLE: Primary | ICD-10-CM

## 2023-12-29 DIAGNOSIS — R90.89 ABNORMAL MRA, BRAIN: ICD-10-CM

## 2023-12-29 PROCEDURE — 3008F BODY MASS INDEX DOCD: CPT | Performed by: OTHER

## 2023-12-29 PROCEDURE — 99214 OFFICE O/P EST MOD 30 MIN: CPT | Performed by: OTHER

## 2023-12-29 RX ORDER — LORAZEPAM 1 MG/1
1 TABLET ORAL
Qty: 1 TABLET | Refills: 0 | Status: SHIPPED | OUTPATIENT
Start: 2023-12-29 | End: 2023-12-29

## 2024-01-18 RX ORDER — PRAVASTATIN SODIUM 10 MG
10 TABLET ORAL NIGHTLY
Qty: 30 TABLET | Refills: 2 | Status: SHIPPED | OUTPATIENT
Start: 2024-01-18

## 2024-01-26 RX ORDER — PRAVASTATIN SODIUM 10 MG
10 TABLET ORAL NIGHTLY
Qty: 90 TABLET | Refills: 0 | OUTPATIENT
Start: 2024-01-26

## 2024-01-30 RX ORDER — EZETIMIBE 10 MG/1
10 TABLET ORAL NIGHTLY
Qty: 90 TABLET | Refills: 0 | Status: SHIPPED | OUTPATIENT
Start: 2024-01-30

## 2024-02-08 ENCOUNTER — OFFICE VISIT (OUTPATIENT)
Dept: GASTROENTEROLOGY | Facility: CLINIC | Age: 59
End: 2024-02-08

## 2024-02-08 VITALS
SYSTOLIC BLOOD PRESSURE: 128 MMHG | WEIGHT: 153.63 LBS | DIASTOLIC BLOOD PRESSURE: 84 MMHG | BODY MASS INDEX: 29.01 KG/M2 | HEIGHT: 61 IN | HEART RATE: 66 BPM

## 2024-02-08 DIAGNOSIS — Z87.11 HISTORY OF PEPTIC ULCER: Primary | ICD-10-CM

## 2024-02-08 PROCEDURE — 3074F SYST BP LT 130 MM HG: CPT | Performed by: INTERNAL MEDICINE

## 2024-02-08 PROCEDURE — 99213 OFFICE O/P EST LOW 20 MIN: CPT | Performed by: INTERNAL MEDICINE

## 2024-02-08 PROCEDURE — 3008F BODY MASS INDEX DOCD: CPT | Performed by: INTERNAL MEDICINE

## 2024-02-08 PROCEDURE — 3079F DIAST BP 80-89 MM HG: CPT | Performed by: INTERNAL MEDICINE

## 2024-02-08 RX ORDER — LORAZEPAM 1 MG/1
1 TABLET ORAL ONCE
COMMUNITY
Start: 2023-12-29

## 2024-02-08 NOTE — PROGRESS NOTES
[facility] [address] - Gastroenterology                                                                                                  Clinic Progress Note    Chief Complaint   Patient presents with    Follow - Up     S/p Egd from 10/2023     HPI:   Elizabeth Maier is a 58 year old  woman with history of BMI 28, chronic headache/migraine, sleep apnea, high cholesterol, gastric ulcer,  here for follow up    Seen in December 2022 for a follow up after hospitalization for abdominal pain and found to have an ulcer. Denied any pain. Had been using acetaminophen for headache and avoiding NSAIDs. Had been taking the pantoprazole prescribed to her twice a day. Felt better than when she was in the hospital.     Today says she is and has been doing well. No abdominal pain, blood in the stool, dark stool, vomiting, unintentional weight loss, NSAID use. Takes her PPI daily.    History, Medications, Allergies, ROS:      Past Medical History:   Diagnosis Date    Colon adenomas 01/03/2018    repeat colonoscopy 1/2021    High cholesterol     Hyperlipidemia     Migraine     LULU (obstructive sleep apnea) 06/23/2023    Sleep apnea       Past Surgical History:   Procedure Laterality Date    COLONOSCOPY N/A 03/23/2021    Procedure: COLONOSCOPY;  Surgeon: Pranav Henriquez MD;  [facility] ENDO    EGD  10/13/2023      Family Hx:   Family History   Problem Relation Age of Onset    Cancer Father       Social History:   Social History     Socioeconomic History    Marital status:    Tobacco Use    Smoking status: Never    Smokeless tobacco: Never   Vaping Use    Vaping Use: Never used   Substance and Sexual Activity    Alcohol use: No     Alcohol/week: 0.0 standard drinks of alcohol    Drug use: No        Medications (Active prior to today's visit):  Current Outpatient Medications   Medication Sig Dispense Refill    ezetimibe 10 MG Oral Tab Take  1 tablet (10 mg total) by mouth nightly. 90 tablet 0    pravastatin 10 MG Oral Tab Take 1 tablet (10 mg total) by mouth nightly. 30 tablet 2    pantoprazole 40 MG Oral Tab EC Take 1 tablet (40 mg total) by mouth every morning before breakfast. 90 tablet 1    alendronate 70 MG Oral Tab Take 1 tablet (70 mg total) by mouth once a week. 4 tablet 0    propranolol 20 MG Oral Tab Take 1 tablet (20 mg total) by mouth 2 (two) times daily. 60 tablet 2    SUMAtriptan Succinate 100 MG Oral Tab Use at onset; repeat once after 2 HRS-ONLY 2 IN 24 HR MAX.  This is a 30 day supply. 9 tablet 11       Allergies:  Allergies   Allergen Reactions    No Known Allergies MYALGIA     ROS:   Systems were reviewed and were negative except as noted in the HPI    PHYSICAL EXAM:   Blood pressure 128/84, pulse 66, height 5' 1\" (1.549 m), weight 153 lb 9.6 oz (69.7 kg), not currently breastfeeding.    General:awake, cooperative, no acute distress  HEENT: EOMI, no scleral icterus, MMM; oral pharnyx is without exudates or lesions  Neck: no lymphadenopathy; thyroid is not enlarged and without nodules  CV: RRR  Resp: non-labored breathing  Abd: soft, non-tender, non-distended  Ext: no lower extremity swelling  Neuro: Alert, Oriented X 3  Skin: no rashes, bruises  Psych: normal affect    Labs/Imaging:     Reviewed as noted in the HPI and A/P    ASSESSMENT/PLAN:   Elizabeth Maier is a 58 year old  woman with history of BMI 28, chronic headache/migraine, sleep apnea, high cholesterol, gastric ulcer,  here for follow up    Had colonoscopy 3/23/2021 with findings of two small/diminutive colon polyps (one serrated adenoma) removed and very non-bleeding hemorrhoids.      CT A/P 12/4/22 noted suggestion of possible antral ulcer. Also noted large amount of stool in the colon.  Found to have a small gastric ulcer in the setting of history noting NSAID use. Random biopsies from the stomach were negative for h.pylori. Suspected NSAID related peptic ulcer and to  heal with minimizing NSAIDs and continuing PPI for healing though recommended repeat EGD to evaluate for healing and biopsy the ulcer if still present.    EGD 2/22/23 found multiple superficial serpiginous and linear ulcerations in the antrum. EGD 10/13/23 showed mild to moderate gastric erythema with some erosions in the gastric antrum overall improved in appearance from prior endoscopic evaluations. Biopsies from these endoscopies were benign.    Noted ED evaluation November 2023 for abdominal pain. CT A/P showed non-dilated fluid-filled loops of small bowel throughout the abdomen with additional abnormal fluid in the colon to the level of the splenic flexure with findings raising suspicion for either a low-grade infectious/inflammatory enterocolitis or malabsorption state. CBC showed a leukocytosis but normal hemoglobin and unremarkable CMP and lipase    Doing well and stable clinically.      Recommend:  -Continue PPI daily  -avoid NSAIDs  -routine follow up in 1 year  -routine/surveillance colonoscopy 3/2026    Orders This Visit:  No orders of the defined types were placed in this encounter.    Meds This Visit:  Requested Prescriptions      No prescriptions requested or ordered in this encounter     Imaging & Referrals:  None     MD Florentin [facility]  [facility] - Gastroenterology  2/8/2024

## 2024-02-12 ENCOUNTER — OFFICE VISIT (OUTPATIENT)
Dept: SURGERY | Facility: CLINIC | Age: 59
End: 2024-02-12
Payer: COMMERCIAL

## 2024-02-12 VITALS
DIASTOLIC BLOOD PRESSURE: 81 MMHG | HEIGHT: 61 IN | SYSTOLIC BLOOD PRESSURE: 144 MMHG | HEART RATE: 76 BPM | BODY MASS INDEX: 28.89 KG/M2 | WEIGHT: 153 LBS

## 2024-02-12 DIAGNOSIS — I67.1 BRAIN ANEURYSM: Primary | ICD-10-CM

## 2024-02-12 PROCEDURE — 99204 OFFICE O/P NEW MOD 45 MIN: CPT | Performed by: NEUROLOGICAL SURGERY

## 2024-02-12 PROCEDURE — 3008F BODY MASS INDEX DOCD: CPT | Performed by: NEUROLOGICAL SURGERY

## 2024-02-12 PROCEDURE — 3079F DIAST BP 80-89 MM HG: CPT | Performed by: NEUROLOGICAL SURGERY

## 2024-02-12 PROCEDURE — 3077F SYST BP >= 140 MM HG: CPT | Performed by: NEUROLOGICAL SURGERY

## 2024-02-12 NOTE — H&P
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  Denies smoking or illicits. Denies a history of thunderclap headache, or family history of aneurysm, intracranial hemorrhage, or sudden unexpected death.    PAST MEDICAL HISTORY:  Past Medical History:   Diagnosis Date    Colon adenomas 01/03/2018    repeat colonoscopy 1/2021    High cholesterol     Hyperlipidemia     Migraine     LULU (obstructive sleep apnea) 06/23/2023    Sleep apnea        PAST SURGICAL HISTORY:  Past Surgical History:   Procedure Laterality Date    COLONOSCOPY N/A 03/23/2021    Procedure: COLONOSCOPY;  Surgeon: Pranav Henriquez MD;  [facility]: [facility]    EGD  10/13/2023       FAMILY HISTORY:  family history includes Cancer in her father.    SOCIAL HISTORY:   reports that she has never smoked. She has never used smokeless tobacco. She reports that she does not drink alcohol and does not use drugs.    ALLERGIES:  Allergies   Allergen Reactions    No Known Allergies MYALGIA       MEDICATIONS:  Current Outpatient Medications on File Prior to Visit   Medication Sig Dispense Refill    LORazepam 1 MG Oral Tab Take 1 tablet (1 mg total) by mouth one time. Prior to MRI      ezetimibe 10 MG Oral Tab Take 1 tablet (10 mg total) by mouth nightly. 90 tablet 0    pravastatin 10 MG Oral Tab Take 1 tablet (10 mg total) by mouth nightly. 30 tablet 2    pantoprazole 40 MG Oral Tab EC Take 1 tablet (40 mg total) by mouth every morning before breakfast. 90 tablet 1    alendronate 70 MG Oral Tab Take 1 tablet (70 mg total)  by mouth once a week. 4 tablet 0    propranolol 20 MG Oral Tab Take 1 tablet (20 mg total) by mouth 2 (two) times daily. 60 tablet 2    SUMAtriptan Succinate 100 MG Oral Tab Use at onset; repeat once after 2 HRS-ONLY 2 IN 24 HR MAX.  This is a 30 day supply. 9 tablet 11     No current facility-administered medications on file prior to visit.       REVIEW OF SYSTEMS:  A 10-point system was reviewed.  Pertinent positives and negatives are noted in HPI.      PHYSICAL EXAMINATION:  VITAL SIGNS: /81 (BP Location: Right arm, Patient Position: Sitting, Cuff Size: adult)   Pulse 76   Ht 61\"   Wt 153 lb (69.4 kg)   BMI 28.91 kg/m²     A&Ox3, no acute distress  PERRL, EOMi, FS, TM  Full strength x 4, no drift  Sensation intact     ASSESSMENT:  57yo female with an unruptured 1.5mm right communicating segment ICA aneurysm vs infundibulum    With a , we discussed the natural history of intracranial aneurysms and the risks of further workup and treatment.  We reviewed the imaging together.  We discussed modifiable risk factors.  We discussed the signs and symptoms of subarachnoid hemorrhage and that emergency medical attention should be sought for these symptoms.  After this detailed discussion and answering all questions, the patient wished to proceed with observation with serial imaging.    Plan:  Scheduled for an MRA shortly - we will follow up over the phone for next follow up pending results of that study    Florencio Hickman MD  Neurological Surgery  [facility] Time: 45 min including face to face time, chart review, imaging interpretation, and coordination of care

## 2024-02-12 NOTE — PROGRESS NOTES
New patient presents today for a consultation. Patient was referred by Dr. Og. Dr. Og referred patient due to an abnormal MRA scan (in [facility]). Patient states she has a history of daily headaches and migraines. She is taking Sumitriptan and Propanolol. She also has a history of vertigo.

## 2024-02-23 ENCOUNTER — HOSPITAL ENCOUNTER (OUTPATIENT)
Dept: MRI IMAGING | Facility: HOSPITAL | Age: 59
Discharge: HOME | End: 2024-02-23
Attending: INTERNAL MEDICINE
Payer: COMMERCIAL

## 2024-02-23 DIAGNOSIS — I67.1: ICD-10-CM

## 2024-02-23 PROCEDURE — 70544 MR ANGIOGRAPHY HEAD W/O DYE: CPT | Performed by: INTERNAL MEDICINE

## 2024-03-04 ENCOUNTER — TELEPHONE (OUTPATIENT)
Dept: SURGERY | Facility: CLINIC | Age: 59
End: 2024-03-04

## 2024-03-04 NOTE — TELEPHONE ENCOUNTER
----- Message from Florencio Hickman MD sent at 3/4/2024  2:38 PM CST -----  Can you call her to let her know her MRA shows that the 2mm right pcomm aneurysm is stable. She should have a repeat in a year.    She's seen at [place], so she can see Kyle with that follow up MRA.  I'll put a message to the nurses to set a reminder.    Thank you!

## 2024-03-04 NOTE — TELEPHONE ENCOUNTER
Noted in routing comment from Dr. Hickman that patient will need serial imaging:    \"Follow up in 1 year with a repeat MRA brain without contrast. She can be seen by an DANIEL at [place].\"     Reminder placed to appear in Nursing pool January 29 th of 2025.

## 2024-04-05 ENCOUNTER — OFFICE VISIT (OUTPATIENT)
Dept: NEUROLOGY | Facility: CLINIC | Age: 59
End: 2024-04-05
Payer: COMMERCIAL

## 2024-04-05 VITALS — WEIGHT: 153 LBS | BODY MASS INDEX: 28.89 KG/M2 | HEIGHT: 61 IN

## 2024-04-05 DIAGNOSIS — G43.009 MIGRAINE WITHOUT AURA AND WITHOUT STATUS MIGRAINOSUS, NOT INTRACTABLE: Primary | ICD-10-CM

## 2024-04-05 PROCEDURE — 3008F BODY MASS INDEX DOCD: CPT | Performed by: OTHER

## 2024-04-05 PROCEDURE — 99214 OFFICE O/P EST MOD 30 MIN: CPT | Performed by: OTHER

## 2024-04-05 RX ORDER — SUMATRIPTAN 100 MG/1
TABLET, FILM COATED ORAL
Qty: 9 TABLET | Refills: 11 | Status: SHIPPED | OUTPATIENT
Start: 2024-04-05 | End: 2024-04-05

## 2024-04-05 RX ORDER — RIMEGEPANT SULFATE 75 MG/75MG
75 TABLET, ORALLY DISINTEGRATING ORAL AS NEEDED
Qty: 8 TABLET | Refills: 11 | Status: SHIPPED | OUTPATIENT
Start: 2024-04-05 | End: 2025-04-05

## 2024-04-05 RX ORDER — PROPRANOLOL HYDROCHLORIDE 20 MG/1
20 TABLET ORAL 2 TIMES DAILY
Qty: 180 TABLET | Refills: 3 | Status: SHIPPED | OUTPATIENT
Start: 2024-04-05 | End: 2025-04-05

## 2024-04-05 NOTE — PROGRESS NOTES
[facility] [address], [address]  [facility] [address]  [phone]        Neurology Clinic Follow Up Note    Chief Complaint:  Migraine (LOV 12/29/23 - The pt presents as a follow up for her hx of migraines. Te patient states she continues to have migraines, but they are getting better - having 1-2 migraines every 3-4 weeks. Pt was seen by neurosurgery for abnormal MRA and will do yearly screening. Pt is in need of refill of the Sumatriptan.)      HPI:   Elizabeth Maier is a 58 year old woman here in follow up for migraines and a right ICA aneurysm/PCOM aneurysm (1.6 mm -- > 2mm).  She stopped her propranolol because her headaches improved significantly.  She went from having daily headaches to having no headaches at all.         When she was first seen she was having daily HA. They improved  on propranolol and she stopped the medication.    She will have them every 2 weeks.  In a month a she will have a migraine  headache 2x a month or 1x a month.    She has a tension type HA at least 1x to 2x a week.       The Migraine Disability Assessment Test (MIDAS)    Note: Select zero if  they did not have the activity in the last 3 months.     On how many days in the last 3 months did you miss work or school because of your headaches?   0 days. She takes sumatriptan. She takes a triptan 3x a month.  She use tylenol instead.      How many days in the last 3 months was your productivity at work or school reduced by half or more because of your headaches? (Do not include days you counted in question 1 where you missed work or school.)  0 days.     On how many days in the last 3 months did you not do household work (such as housework, home repairs and maintenance, shopping, caring for children and relatives) because of your headaches?  0 days.     How many days in the last 3 months was your productivity in household work reduced by half of more because of your headaches? (Do not include days you  counted in question 3 where you did not do household work.)  0 days.     On how many days in the last 3 months did you miss family, social or leisure activities because of your headaches?   0 days.      Total (Questions 1-5):  0             A.On how many days in the last 3 months did you have a headache? (If a headache lasted more than 1  day, count each day.)      B.On a scale of 0 - 10, on average how painful were these headaches? (where 0=no pain at all, and 10= pain as bad as it can be.)      Scoring:  After you have filled out this questionnaire, add the total number of days from questions 1-5 (ignore A and B).      MIDAS Grade  Definition  MIDAS Score       I        Little or No Disability     0-5          II     Mild Disability     6-10          III     Moderate Disability     11-20          IV     Severe Disability     21+          © [employer] 1997 © 2007, [employer]. All Rights reserved.     When she takes the triptan it makes her stomach upset.  Pain improves w/ water.      Medications:  Current Outpatient Medications   Medication Instructions    alendronate (FOSAMAX) 70 mg, Oral, Weekly    ezetimibe (ZETIA) 10 mg, Oral, Nightly    LORazepam (ATIVAN) 1 mg, Oral, Once, Prior to MRI     pantoprazole (PROTONIX) 40 mg, Oral, Every morning before breakfast    pravastatin (PRAVACHOL) 10 mg, Oral, Nightly    propranolol (INDERAL) 20 mg, Oral, 2 times daily    SUMAtriptan Succinate 100 MG Oral Tab Use at onset; repeat once after 2 HRS-ONLY 2 IN 24 HR MAX.  This is a 30 day supply.         Reviewed and assessed    Objective:  Last vitals and weight :  There is no height or weight on file to calculate BMI.  not currently breastfeeding.  There were no vitals taken for this visit.   There were no vitals filed for this visit.     Exam:  - General: appears stated age and no distress  - CV: symmetric pulses   Carotids:   - Pulmonary: Normal excursion of the chest.  No signs of  respiratory distress.  Neurologic Exam  - Mental Status: Alert and attentive.  Oriented x 4.  Speech is spontaneous, fluent, and prosodic. Comprehension  intact. Phrase length and rate are normal. No neglect.   - Cranial Nerves: No gaze preference. Visual fields:normal  Pupils are 5 mm briskly constricting to 4 mm and equally round and reactive to light  in a well lit room. No rAPD. EOMI. No nystagmus. No ptosis. V1-V3 intact B/L to light touch.No pathological facial asymmetry. No flattening of the nasolabial fold. .  Hearing grossly intact.  Tongue midline. No atrophy or fasiculations of the tongue noted. Palate and uvula elevate symmetrically.  Shoulder shrug symmetric.    - Motor:  normal tone/bulk. No interosseous wasting. No flattening of hypothenar eminences.   Motor Strength    Pronator drift: No pronator drift   Arm Rolling: No orbiting.   Finger Taps: Finger taps are symmetric in rate and amplitude.    Right Left     Motor Strength    Hip Flexors 5 5   Knee extensors 5 5  - Sensory:   Light touch: normal  Temperature: normal  Vibration: normal      Reflexes:    C5 C6 C7  L4 S1   R 2+ 2+  2+ 2+   L 2+ 2+  2+ 2+      Nonsustained clonus: Absent   Sustained clonus: Absent     - Cerebellum: No truncal ataxia. No titubations. No dysmetria, no dysdiadochokinesis.  - Gait/station: Normal gait and station. Symmetric arm swing.   - Toe walking:  - Heel walking:    Most recent lab results:   Reviewed and assessed  No results found for this or any previous visit (from the past 36 hour(s)).    Diagnostic studies:  Performed an independent visualization of MRA  Imaging revealed: Reviewed MRI and MRA.  Agree with radiology read.    Assessment   She can't tolerate sumatriptan b/c of abd pain. Will try nurtec. Will need prior authorization.  She will need a copay card as well.   Her aneurysm has ?increased in size; she is following with [facility].      An adequate trial of a preventative medication is 2 to 3 months  at the target dose. Limit  rescue agents to <2 times per week to avoid developing medication overuse headaches.       Plan   1.  Migraine incidental finding on MRA  Diagnostics:  Maintain a daily headache diary  Repeat MRA  Therapeutics  There are no diagnoses linked to this encounter.     Preventative: Beta Blocker: Propranolol, Nadolol, Metoprolol   Rescue/Acute Therapy: Rimegepant   Agent for status migrainosus or to treat medication overuse headache: N/A  3. Lifestyle modifications:  Recommend  a stable daily schedule as changes in a pt's daily schedule trigger HAs.  This includes bedtime and wake up time, eating meals regularly, exercising, and maintaining a consistently low stress lifestyle.  Exercise: moderate intensity aerobic exercise (150 minutes/week in 3-5 sessions).  Practice good sleep hygiene. Maintain regular sleep cycles with bedtime and wake up times that do not differ significantly between weekdays and weekends.  Maintain hydration: Visit ActualMeds to determine the individual water needs based on gender, weight, weather, and level of physical activity.  Avoid factors that increase the risk of developing more frequent headaches including caffeine, obesity, certain sleep disorders, and medication overuse.  To determine if caffeine avoidance will decrease HA burden pts must abstain for at least 2 to 3 months.  If obese or overweight consider weight loss which will decrease the number of headache days.  Simple analgesics are overused if taken on 15 or more days per month regardless of the reason.  All other medications are overused when taken on 10 or more days per month.  Avoid triggers.  Patient will let my office know if she becomes pregnant or plan to become pregnant so we can adjust/stop medication safely      2.  2mm aneurysm on  MRA  Diagnostics:  MRA  Follow up w nsgy   Therapeutics  None         2014 AAN quality measure for migraine    prescribed medication for acute migraine  attack Yes   Prescribed an acute medication for cluster headache Yes   Preventative Medication Prescribed Yes   Avoid overuse of Barbiturate Containing Medications for Primary Headache Disorders Yes   Avoid overuse of opioid containing Medications for Primary Headache Disorders Yes   Avoid overuse of neuroimaging in patients with primary headache disorders and normal neurological exams Yes   health related quality of life was assessed with a tool during at least two visits* in the last year AND whose health-related quality of life score stayed the same or improved. Yes   Number of days during the past 3 months, as categorized* by patients or their caregivers, that they are unable to perform common daily activities (e.g., school, work, household chores, social activities, Independent Activities of Daily Living (IADLS), etc.) due to migraine headache or cervicogenic headache. Yes   Patients who had a headache management plan of care for migraine headache or cervicogenic  headache* developed or reviewed by the clinician at least once during the last year. *Headache management plan of care may include: goals for headache management (e.g.,reduced number of days of migraine per month, reduce severity of headache), a plan for acute migraine medications, preventive migraine medications, non-pharmacological options(e.g., trigger management, stress reduction, physical therapy), communication between providers, referral to a headache specialist or other relevant items Yes       Education Provided  Education/Instructions given to: patient   Barriers to Learning:  None  Content: Refer to note above   Evaluation/Outcome: verbalized understanding    This document is not intended to support charting by exception.  Sections left blank in a completed note should be presumed not to have been done.    Disclaimer:   This record was dictated using  Dragon software. There may be errors due to voice recognition problems that were not  realized and corrected during the completion of the note.           Thank you for allowing me to participate in the care of your patient.    Filiberto Og DO  04/05/24

## 2024-04-08 RX ORDER — LORAZEPAM 1 MG/1
TABLET ORAL
Qty: 1 TABLET | Refills: 0 | OUTPATIENT
Start: 2024-04-08

## 2024-04-09 ENCOUNTER — TELEPHONE (OUTPATIENT)
Dept: NEUROLOGY | Facility: CLINIC | Age: 59
End: 2024-04-09

## 2024-04-09 RX ORDER — PRAVASTATIN SODIUM 10 MG
10 TABLET ORAL NIGHTLY
Qty: 90 TABLET | Refills: 0 | OUTPATIENT
Start: 2024-04-09

## 2024-04-09 NOTE — TELEPHONE ENCOUNTER
PA approval letter received from [employer]  Nurtec 75mg ODT (up to 54 tabs per 90 days) APPROVED from 3/9/24 to 4/8/25      Letter sent to scanning

## 2024-04-10 ENCOUNTER — MED REC SCAN ONLY (OUTPATIENT)
Dept: NEUROLOGY | Facility: CLINIC | Age: 59
End: 2024-04-10

## 2024-04-24 ENCOUNTER — TELEPHONE (OUTPATIENT)
Dept: INTERNAL MEDICINE CLINIC | Facility: CLINIC | Age: 59
End: 2024-04-24

## 2024-04-24 RX ORDER — PRAVASTATIN SODIUM 10 MG
10 TABLET ORAL NIGHTLY
Qty: 90 TABLET | Refills: 0 | OUTPATIENT
Start: 2024-04-24

## 2024-04-24 RX ORDER — PRAVASTATIN SODIUM 10 MG
10 TABLET ORAL NIGHTLY
Qty: 30 TABLET | Refills: 0 | Status: SHIPPED | OUTPATIENT
Start: 2024-04-24

## 2024-04-24 NOTE — TELEPHONE ENCOUNTER
Patient needs to get labs done, partial refill was approved.    Message left informing patient she needs to get labs done and that she has to be fasting for them.

## 2024-04-26 ENCOUNTER — LAB ENCOUNTER (OUTPATIENT)
Dept: LAB | Age: 59
End: 2024-04-26
Attending: INTERNAL MEDICINE
Payer: COMMERCIAL

## 2024-04-26 DIAGNOSIS — E78.5 HYPERLIPIDEMIA, UNSPECIFIED HYPERLIPIDEMIA TYPE: ICD-10-CM

## 2024-04-26 LAB
CHOLEST SERPL-MCNC: 207 MG/DL (ref ?–200)
FASTING PATIENT LIPID ANSWER: YES
HDLC SERPL-MCNC: 54 MG/DL (ref 40–59)
LDLC SERPL CALC-MCNC: 116 MG/DL (ref ?–100)
NONHDLC SERPL-MCNC: 153 MG/DL (ref ?–130)
TRIGL SERPL-MCNC: 212 MG/DL (ref 30–149)
VLDLC SERPL CALC-MCNC: 37 MG/DL (ref 0–30)

## 2024-04-26 PROCEDURE — 36415 COLL VENOUS BLD VENIPUNCTURE: CPT

## 2024-04-26 PROCEDURE — 80061 LIPID PANEL: CPT

## 2024-05-03 ENCOUNTER — TELEPHONE (OUTPATIENT)
Dept: INTERNAL MEDICINE CLINIC | Facility: CLINIC | Age: 59
End: 2024-05-03

## 2024-05-07 RX ORDER — PRAVASTATIN SODIUM 10 MG
10 TABLET ORAL NIGHTLY
Qty: 90 TABLET | Refills: 2 | Status: SHIPPED | OUTPATIENT
Start: 2024-05-07

## 2024-05-07 NOTE — TELEPHONE ENCOUNTER
Please inform her that lipid panel reveals LDL is much better improved however triglycerides are now elevated.  I recommend she continue to take pravastatin.  I have sent her a 90-day supply with refills.  Continue to focus on healthy dietary changes.    Follow-up in 6 months.

## 2024-05-28 ENCOUNTER — TELEPHONE (OUTPATIENT)
Facility: CLINIC | Age: 59
End: 2024-05-28

## 2024-05-28 RX ORDER — PANTOPRAZOLE SODIUM 40 MG/1
40 TABLET, DELAYED RELEASE ORAL
Qty: 90 TABLET | Refills: 1 | Status: SHIPPED | OUTPATIENT
Start: 2024-05-28

## 2024-05-28 RX ORDER — EZETIMIBE 10 MG/1
10 TABLET ORAL NIGHTLY
Qty: 90 TABLET | Refills: 0 | Status: SHIPPED | OUTPATIENT
Start: 2024-05-28

## 2024-05-28 NOTE — TELEPHONE ENCOUNTER
Current Outpatient Medications   Medication Sig Dispense Refill    pantoprazole 40 MG Oral Tab EC Take 1 tablet (40 mg total) by mouth every morning before breakfast. 90 tablet 1

## 2024-06-03 RX ORDER — PANTOPRAZOLE SODIUM 40 MG/1
40 TABLET, DELAYED RELEASE ORAL
Qty: 90 TABLET | Refills: 1 | OUTPATIENT
Start: 2024-06-03

## 2024-06-03 NOTE — TELEPHONE ENCOUNTER
Requested Prescriptions     Pending Prescriptions Disp Refills    PANTOPRAZOLE 40 MG Oral Tab EC [Pharmacy Med Name: PANTOPRAZOLE SOD DR 40 MG TAB] 90 tablet 1     Sig: TAKE 1 TABLET BY MOUTH EVERY DAY IN THE MORNING BEFORE BREAKFAST        Duplicate request refilled on 5/28/2024 for 90 days + 1 refill

## 2024-06-04 RX ORDER — EZETIMIBE 10 MG/1
10 TABLET ORAL NIGHTLY
Qty: 90 TABLET | Refills: 0 | OUTPATIENT
Start: 2024-06-04

## 2024-06-24 ENCOUNTER — TELEPHONE (OUTPATIENT)
Dept: INTERNAL MEDICINE CLINIC | Facility: CLINIC | Age: 59
End: 2024-06-24

## 2024-06-24 ENCOUNTER — NURSE TRIAGE (OUTPATIENT)
Dept: INTERNAL MEDICINE CLINIC | Facility: CLINIC | Age: 59
End: 2024-06-24

## 2024-06-24 NOTE — TELEPHONE ENCOUNTER
Triage- Returned call via  [phone]) to pt complaining of Knee pain/requesting  appt with Dr Lino.           Dr Holland CHUA until 7/8/2024.    Unable to reach patient to triage knee pain. Last OV w/ PCP 10/2023- no existing knee conditions documented.    Vmail message left via  for pt to please return call to the office to spk with triage nurse.

## 2024-06-24 NOTE — TELEPHONE ENCOUNTER
Pt called stating that she has been having pain on left knee for the past month and every day is worst, can't bent knee because of pain  Pt has been taking tylenol    Pt wants to know what pcp recommends or if possible to be seen   Please advise

## 2024-06-24 NOTE — TELEPHONE ENCOUNTER
2 attempts made to contact patient. Unable to reach., Message left on voicemail to call and speak with the nurse.

## 2024-07-29 ENCOUNTER — HOSPITAL ENCOUNTER (OUTPATIENT)
Dept: GENERAL RADIOLOGY | Facility: HOSPITAL | Age: 59
Discharge: HOME | End: 2024-07-29
Attending: INTERNAL MEDICINE
Payer: COMMERCIAL

## 2024-07-29 ENCOUNTER — OFFICE VISIT (OUTPATIENT)
Dept: INTERNAL MEDICINE CLINIC | Facility: CLINIC | Age: 59
End: 2024-07-29
Payer: COMMERCIAL

## 2024-07-29 ENCOUNTER — HOSPITAL ENCOUNTER (OUTPATIENT)
Dept: GENERAL RADIOLOGY | Facility: HOSPITAL | Age: 59
End: 2024-07-29
Attending: INTERNAL MEDICINE
Payer: COMMERCIAL

## 2024-07-29 VITALS
WEIGHT: 154.63 LBS | BODY MASS INDEX: 29.19 KG/M2 | OXYGEN SATURATION: 97 % | SYSTOLIC BLOOD PRESSURE: 116 MMHG | HEART RATE: 85 BPM | DIASTOLIC BLOOD PRESSURE: 78 MMHG | HEIGHT: 61 IN | RESPIRATION RATE: 18 BRPM

## 2024-07-29 DIAGNOSIS — M25.561 CHRONIC PAIN OF BOTH KNEES: Primary | ICD-10-CM

## 2024-07-29 DIAGNOSIS — M25.562 CHRONIC PAIN OF BOTH KNEES: ICD-10-CM

## 2024-07-29 DIAGNOSIS — M25.561 CHRONIC PAIN OF BOTH KNEES: ICD-10-CM

## 2024-07-29 DIAGNOSIS — G89.29 CHRONIC PAIN OF BOTH KNEES: ICD-10-CM

## 2024-07-29 DIAGNOSIS — G89.29 CHRONIC PAIN OF BOTH KNEES: Primary | ICD-10-CM

## 2024-07-29 DIAGNOSIS — M25.562 CHRONIC PAIN OF BOTH KNEES: Primary | ICD-10-CM

## 2024-07-29 PROCEDURE — 3008F BODY MASS INDEX DOCD: CPT | Performed by: INTERNAL MEDICINE

## 2024-07-29 PROCEDURE — 3074F SYST BP LT 130 MM HG: CPT | Performed by: INTERNAL MEDICINE

## 2024-07-29 PROCEDURE — 3078F DIAST BP <80 MM HG: CPT | Performed by: INTERNAL MEDICINE

## 2024-07-29 PROCEDURE — 99214 OFFICE O/P EST MOD 30 MIN: CPT | Performed by: INTERNAL MEDICINE

## 2024-07-29 PROCEDURE — 73564 X-RAY EXAM KNEE 4 OR MORE: CPT | Performed by: INTERNAL MEDICINE

## 2024-07-29 RX ORDER — TRAMADOL HYDROCHLORIDE 50 MG/1
50 TABLET ORAL 2 TIMES DAILY PRN
Qty: 60 TABLET | Refills: 0 | Status: SHIPPED | OUTPATIENT
Start: 2024-07-29

## 2024-07-30 NOTE — PROGRESS NOTES
[facility] of [facility]  Return Patient Progress Note      HPI:     Chief Complaint   Patient presents with    Knee Pain     Pt states that she is having bilateral knee pain L>R, Pt states that she has had pain for about 2 months. States that pain comes and goes,        Elizabeth Maier is a 59 year old female presenting for:    Has a significant  has a past medical history of Colon adenomas (01/03/2018), High cholesterol, Hyperlipidemia, Migraine, LULU (obstructive sleep apnea) (06/23/2023), and Sleep apnea.    Bilateral knee pain.  L>R.  Progressive pain.        Labs:   CMP:  Lab Results   Component Value Date/Time     11/11/2023 03:17 AM    K 3.7 11/11/2023 03:17 AM     11/11/2023 03:17 AM    CO2 21.0 11/11/2023 03:17 AM    CREATSERUM 0.70 11/11/2023 03:17 AM    CA 9.8 11/11/2023 03:17 AM     (H) 11/11/2023 03:17 AM    TP 7.3 11/11/2023 03:17 AM    ALB 4.7 11/11/2023 03:17 AM    ALKPHO 111 11/11/2023 03:17 AM    AST 22 11/11/2023 03:17 AM    ALT 18 11/11/2023 03:17 AM    BILT 0.7 11/11/2023 03:17 AM    TSH 5.620 (H) 01/03/2020 08:50 AM    T4F 1.1 01/03/2020 08:50 AM        CBC:  Lab Results   Component Value Date    WBC 15.2 (H) 11/11/2023    HGB 14.6 11/11/2023    HCT 43.3 11/11/2023    .0 11/11/2023    NEPERCENT 86.0 11/11/2023    LYPERCENT 5.2 11/11/2023    MOPERCENT 7.3 11/11/2023    EOPERCENT 0.9 11/11/2023    BAPERCENT 0.3 11/11/2023    NE 13.09 (H) 11/11/2023    LYMABS 0.80 (L) 11/11/2023    MOABSO 1.12 (H) 11/11/2023    EOABSO 0.14 11/11/2023    BAABSO 0.04 11/11/2023          Hemoglobin A1C, Microalbumin  No results found for: \"A1C\"     Lipid panel  Lab Results   Component Value Date/Time    CHOLEST 207 (H) 04/26/2024 08:56 AM    HDL 54 04/26/2024 08:56 AM    TRIG 212 (H) 04/26/2024 08:56 AM     (H) 04/26/2024 08:56 AM    NONHDLC 153 (H) 04/26/2024 08:56 AM        Medications:  Current Outpatient Medications   Medication Sig Dispense Refill    traMADol  50 MG Oral Tab Take 1 tablet (50 mg total) by mouth 2 (two) times daily as needed for Pain. 60 tablet 0    pantoprazole 40 MG Oral Tab EC Take 1 tablet (40 mg total) by mouth every morning before breakfast. 90 tablet 1    ezetimibe 10 MG Oral Tab Take 1 tablet (10 mg total) by mouth nightly. 90 tablet 0    pravastatin 10 MG Oral Tab Take 1 tablet (10 mg total) by mouth nightly. 90 tablet 2    propranolol 20 MG Oral Tab Take 1 tablet (20 mg total) by mouth 2 (two) times daily. 180 tablet 3    Rimegepant Sulfate (NURTEC) 75 MG Oral Tablet Dispersible Take 75 mg by mouth as needed. Take one tablet at onset of migraine.  Maximum dose in 24 hours is 1 tablet (75mg). 8 tablet 11    LORazepam 1 MG Oral Tab Take 1 tablet (1 mg total) by mouth one time. Prior to MRI      alendronate 70 MG Oral Tab Take 1 tablet (70 mg total) by mouth once a week. 4 tablet 0      PMH:  Past Medical History:    Colon adenomas    repeat colonoscopy 1/2021    High cholesterol    Hyperlipidemia    Migraine    LULU (obstructive sleep apnea)    Sleep apnea         PSH:  Past Surgical History:   Procedure Laterality Date    Colonoscopy N/A 03/23/2021    Procedure: COLONOSCOPY;  Surgeon: Pranav Henriquez MD;  Location: [facility]    Egd  10/13/2023       Allergies:  Allergies   Allergen Reactions    No Known Allergies MYALGIA      Social History:  Social History     Socioeconomic History    Marital status:    Tobacco Use    Smoking status: Never    Smokeless tobacco: Never   Vaping Use    Vaping status: Never Used   Substance and Sexual Activity    Alcohol use: No     Alcohol/week: 0.0 standard drinks of alcohol    Drug use: No      Family History:  Family History   Problem Relation Age of Onset    Cancer Father               REVIEW OF SYSTEMS:   Review of Systems   Constitutional:  Negative for chills, fatigue, fever and unexpected weight change.   HENT:  Negative for congestion, ear pain, hearing loss, rhinorrhea, sinus pain and sore  throat.    Eyes:  Negative for pain, redness and visual disturbance.   Respiratory:  Negative for apnea, cough, chest tightness, shortness of breath and wheezing.    Cardiovascular:  Negative for chest pain, palpitations and leg swelling.   Gastrointestinal:  Negative for abdominal distention, abdominal pain, blood in stool, constipation and nausea.   Endocrine: Negative for cold intolerance, heat intolerance and polyuria.   Genitourinary:  Negative for dysuria, hematuria and urgency.   Musculoskeletal:  Negative for arthralgias, back pain, gait problem, joint swelling, myalgias and neck pain.        Knee pain   Skin:  Negative for rash and wound.   Allergic/Immunologic: Negative for food allergies and immunocompromised state.   Neurological:  Negative for dizziness, seizures, facial asymmetry, speech difficulty, weakness, light-headedness, numbness and headaches.   Hematological:  Negative for adenopathy. Does not bruise/bleed easily.   Psychiatric/Behavioral:  Negative for behavioral problems, sleep disturbance and suicidal ideas. The patient is not nervous/anxious.             PHYSICAL EXAM:   /78   Pulse 85   Resp 18   Ht 5' 1\" (1.549 m)   Wt 154 lb 9.6 oz (70.1 kg)   SpO2 97%   BMI 29.21 kg/m²  Estimated body mass index is 29.21 kg/m² as calculated from the following:    Height as of this encounter: 5' 1\" (1.549 m).    Weight as of this encounter: 154 lb 9.6 oz (70.1 kg).     Wt Readings from Last 3 Encounters:   07/29/24 154 lb 9.6 oz (70.1 kg)   04/05/24 153 lb (69.4 kg)   02/12/24 153 lb (69.4 kg)       Physical Exam  Vitals reviewed.   Constitutional:       General: She is not in acute distress.     Appearance: She is well-developed.   HENT:      Head: Normocephalic and atraumatic.   Eyes:      Conjunctiva/sclera: Conjunctivae normal.      Pupils: Pupils are equal, round, and reactive to light.   Neck:      Thyroid: No thyromegaly.   Cardiovascular:      Rate and Rhythm: Normal rate and regular  rhythm.      Heart sounds: Normal heart sounds, S1 normal and S2 normal. No murmur heard.     No friction rub. No gallop.   Pulmonary:      Effort: Pulmonary effort is normal. No respiratory distress.      Breath sounds: Normal breath sounds. No wheezing or rales.   Chest:      Chest wall: No tenderness.   Abdominal:      General: Bowel sounds are normal. There is no distension.      Palpations: Abdomen is soft. There is no mass.      Tenderness: There is no abdominal tenderness. There is no guarding or rebound.   Musculoskeletal:         General: No tenderness. Normal range of motion.      Cervical back: Normal range of motion.   Lymphadenopathy:      Cervical: No cervical adenopathy.   Skin:     General: Skin is warm.      Findings: No erythema or rash.   Neurological:      Mental Status: She is alert and oriented to person, place, and time.      Cranial Nerves: No cranial nerve deficit.      Deep Tendon Reflexes: Reflexes are normal and symmetric.   Psychiatric:         Behavior: Behavior normal.         Thought Content: Thought content normal.         Judgment: Judgment normal.               ASSESSMENT AND PLAN:   Patient is a 59 year old female who presents primarily presents for:    (M25.561,  M25.562,  G89.29) Chronic pain of both knees  (primary encounter diagnosis)  Plan: XR KNEE, COMPLETE (4 OR MORE VIEWS), RIGHT         (CPT=73564), Physiatry Referral - In Network,         Physical Therapy Referral - [facility] [place],        traMADol 50 MG Oral Tab, CANCELED: XR KNEE,         COMPLETE (4 OR MORE VIEWS), LEFT (CPT=73564)            Pt unable to take NSAIDs due to hx of gastric ulcer.  Tramadol prescribed.    Extensive time discussing Knee OA.  Knee xray to be ordered and referral to PT and physiatry.     If FMLA forms needed so that she can attend PT ok to drop off.            Health Maintenance:    Health Maintenance   Topic Date Due    DTaP,Tdap,and Td Vaccines (1 - Tdap) Never done    COVID-19 Vaccine  (2 - 2023-24 season) 09/01/2023    Annual Depression Screening  01/01/2024    Annual Physical  03/22/2024    Influenza Vaccine (1) 10/01/2024    Mammogram  12/15/2024    Pap Smear  05/20/2025    Colorectal Cancer Screening  03/23/2026    Zoster Vaccines  Completed    Pneumococcal Vaccine: Birth to 64yrs  Aged Out         Meds & Refills for this Visit:  Requested Prescriptions     Signed Prescriptions Disp Refills    traMADol 50 MG Oral Tab 60 tablet 0     Sig: Take 1 tablet (50 mg total) by mouth 2 (two) times daily as needed for Pain.       No orders of the defined types were placed in this encounter.      Imaging & Consults:  PHYSIATRY - INTERNAL  PHYSICAL THERAPY - INTERNAL          No follow-ups on file.  Important follow up notes/labs for next visit      Patient indicates understanding of the above recommendations and agrees to the above plan.  Reasurrance and education provided. All questions answered.    Notified to call with any questions, complications, allergies, or worsening or changing symptoms as well as any side effects or complications from the treatments .  Red flags/ ER precautions discussed.    If diagnostic labs or imaging ordered advised patient to contact my office for results  24-48 hours after completion    This note was dictated using dragon speech recognition transcription software.  Typographical and transcription errors may be present.  Please call if any questions.             Ravindra Lino MD  [facility]

## 2024-07-31 ENCOUNTER — TELEPHONE (OUTPATIENT)
Dept: INTERNAL MEDICINE CLINIC | Facility: CLINIC | Age: 59
End: 2024-07-31

## 2024-08-02 NOTE — TELEPHONE ENCOUNTER
Left a message by  Ceci # [phone] on Elizabeth's voice mail to call the office.       Inquire symptoms and triage if needed and if she has discussed varicose veins in the past?

## 2024-08-13 ENCOUNTER — TELEPHONE (OUTPATIENT)
Dept: INTERNAL MEDICINE CLINIC | Facility: CLINIC | Age: 59
End: 2024-08-13

## 2024-08-13 NOTE — TELEPHONE ENCOUNTER
Patient says tramadol might have given her allergies. She is very itchy all over her body and she has red spots since taking Sunday. This is the only new medication she has been taking. She wants to know if it could be tramadol? Please advise.

## 2024-08-14 NOTE — TELEPHONE ENCOUNTER
Attempted to contact patient. Unable to reach. Message left to call triage and speak with the nurse and to not take any more tramadol.

## 2024-08-23 ENCOUNTER — TELEPHONE (OUTPATIENT)
Dept: INTERNAL MEDICINE CLINIC | Facility: CLINIC | Age: 59
End: 2024-08-23

## 2024-08-23 DIAGNOSIS — I83.90 VARICOSE VEIN: Primary | ICD-10-CM

## 2024-08-26 RX ORDER — EZETIMIBE 10 MG/1
10 TABLET ORAL NIGHTLY
Qty: 90 TABLET | Refills: 0 | Status: SHIPPED | OUTPATIENT
Start: 2024-08-26

## 2024-08-27 RX ORDER — PANTOPRAZOLE SODIUM 40 MG/1
40 TABLET, DELAYED RELEASE ORAL
Qty: 90 TABLET | Refills: 1 | Status: SHIPPED | OUTPATIENT
Start: 2024-08-27

## 2024-08-27 NOTE — TELEPHONE ENCOUNTER
Requested Prescriptions     Pending Prescriptions Disp Refills    PANTOPRAZOLE 40 MG Oral Tab EC [Pharmacy Med Name: PANTOPRAZOLE 40MG TABLETS] 90 tablet 1     Sig: TAKE ONE TABLET BY MOUTH EVERY DAY IN THE MORNING BEFORE BREAKFAST     LOV:2/8/24    LR:5/28/24

## 2024-10-08 ENCOUNTER — OFFICE VISIT (OUTPATIENT)
Dept: PHYSICAL MEDICINE AND REHAB | Facility: CLINIC | Age: 59
End: 2024-10-08
Payer: COMMERCIAL

## 2024-10-08 VITALS — BODY MASS INDEX: 29 KG/M2 | WEIGHT: 154 LBS

## 2024-10-08 DIAGNOSIS — K29.60 NSAID INDUCED GASTRITIS: Primary | ICD-10-CM

## 2024-10-08 DIAGNOSIS — M17.10 PRIMARY OSTEOARTHRITIS OF KNEE, UNSPECIFIED LATERALITY: ICD-10-CM

## 2024-10-08 DIAGNOSIS — M22.2X9 PATELLOFEMORAL PAIN SYNDROME, UNSPECIFIED LATERALITY: ICD-10-CM

## 2024-10-08 DIAGNOSIS — E78.5 HYPERLIPIDEMIA, UNSPECIFIED HYPERLIPIDEMIA TYPE: ICD-10-CM

## 2024-10-08 DIAGNOSIS — T39.395A NSAID INDUCED GASTRITIS: Primary | ICD-10-CM

## 2024-10-08 PROCEDURE — 99204 OFFICE O/P NEW MOD 45 MIN: CPT | Performed by: PHYSICAL MEDICINE & REHABILITATION

## 2024-10-08 NOTE — H&P
[facility]  Clinic H&P    Requesting Physician: Ravindra Lino MD    Chief Complaint (Reason for Visit):    Chief Complaint   Patient presents with    New Patient     New right handed patient here for bilateral knee pain. Pain 7/10. Started 4 month ago states she works standing all day . Admits T/N bilateral in the knees . Xray both knees 7/29/24. No prior PT or injections .Denies Prior surgeries injuries. Tramadol prn.        History of Present Illness:  The patient is a 59 year old right-handed female with a significant past medical history of hyperlipidemia, GERD, and gastric ulcer who presents with bilateral knee pain (left greater than right) that has been ongoing for at least 4 months without an inciting event.  She rates her pain a 7 out of 10, sharp and aching, and nonradiating.  Her symptoms are aggravated by standing, walking, and stairs.  She has tried tramadol as needed for pain which is somewhat helpful.  She denies any injections, therapies, or surgeries.  She has had x-rays of the knees which I independently reviewed.    PAST MEDICAL HISTORY:   Past Medical History:    Colon adenomas    repeat colonoscopy 1/2021    High cholesterol    Hyperlipidemia    Migraine    LULU (obstructive sleep apnea)    Sleep apnea       PAST SURGICAL HISTORY:   Past Surgical History:   Procedure Laterality Date    Colonoscopy N/A 03/23/2021    Procedure: COLONOSCOPY;  Surgeon: Pranav Henriquze MD;  Location: [facility]    Eg  10/13/2023        FAMILY HISTORY:   Family History   Problem Relation Age of Onset    Cancer Father        SOCIAL HISTORY:   Social History     Occupational History    Not on file   Tobacco Use    Smoking status: Never    Smokeless tobacco: Never   Vaping Use    Vaping status: Never Used   Substance and Sexual Activity    Alcohol use: No     Alcohol/week: 0.0 standard drinks of alcohol    Drug use: No    Sexual activity: Not on file       CURRENT  MEDICATIONS:   Current Outpatient Medications   Medication Sig Dispense Refill    PANTOPRAZOLE 40 MG Oral Tab EC TAKE ONE TABLET BY MOUTH EVERY DAY IN THE MORNING BEFORE BREAKFAST 90 tablet 1    EZETIMIBE 10 MG Oral Tab TAKE ONE TABLET BY MOUTH EVERY NIGHT 90 tablet 0    traMADol 50 MG Oral Tab Take 1 tablet (50 mg total) by mouth 2 (two) times daily as needed for Pain. 60 tablet 0    pravastatin 10 MG Oral Tab Take 1 tablet (10 mg total) by mouth nightly. 90 tablet 2    propranolol 20 MG Oral Tab Take 1 tablet (20 mg total) by mouth 2 (two) times daily. 180 tablet 3    Rimegepant Sulfate (NURTEC) 75 MG Oral Tablet Dispersible Take 75 mg by mouth as needed. Take one tablet at onset of migraine.  Maximum dose in 24 hours is 1 tablet (75mg). 8 tablet 11    LORazepam 1 MG Oral Tab Take 1 tablet (1 mg total) by mouth one time. Prior to MRI      alendronate 70 MG Oral Tab Take 1 tablet (70 mg total) by mouth once a week. 4 tablet 0        ALLERGIES:   Allergies   Allergen Reactions    No Known Allergies MYALGIA         REVIEW OF SYSTEMS:   Review of Systems   Constitutional: Negative.    HENT: Negative.    Eyes: Negative.    Respiratory: Negative.    Cardiovascular: Negative.    Gastrointestinal: Negative.    Genitourinary: Negative.    Musculoskeletal: As per HPI   Skin: Negative.    Neurological: As per HPI  Endo/Heme/Allergies: Negative.    Psychiatric/Behavioral: Negative.      All other systems reviewed and are negative. Pertinent positives and negatives noted in the HPI.        PHYSICAL EXAM:   Wt 154 lb (69.9 kg)   BMI 29.10 kg/m²     Body mass index is 29.1 kg/m².      General: No immediate distress  Head: Normocephalic/ Atraumatic  Eyes: Extra-occular movements intact.   Ears: No auricular hematoma or deformities  Mouth: No lesions or ulcerations  Heart: peripheral pulses intact. Normal capillary refill.   Lungs: Non-labored respirations  Abdomen: No abdominal guarding  Extremities: No lower extremity edema  bilaterally   Skin: No lesions noted.   Cognition: alert & oriented x 3, attentive, able to follow 2 step commands, comprehention intact, spontaneous speech intact  Motor:    Musculoskeletal:    KNEE:  Inspection: no erythema, swelling, or obvious deformity  Palpation: Tender to palpation over the bilateral medial and lateral joint line of the knees (left greater than right), medial patellar facet on the left  ROM: Full active ROM in flexion and extension  Yanick Test: negative for pain over patella  Lachmans: negative for instability  Anterior / Posterior drawer: negative for instability  Valgus/Varus stress test: negative for instablity  Kacy Test: negative for medial or lateral joint line pain or \"catch\"      Gait:  Normal    Data  [facility] Lab Encounter on 04/26/2024   Component Date Value Ref Range Status    Cholesterol, Total 04/26/2024 207 (H)  <200 mg/dL Final    HDL Cholesterol 04/26/2024 54  40 - 59 mg/dL Final    Triglycerides 04/26/2024 212 (H)  30 - 149 mg/dL Final    LDL Cholesterol 04/26/2024 116 (H)  <100 mg/dL Final    VLDL 04/26/2024 37 (H)  0 - 30 mg/dL Final    Non HDL Chol 04/26/2024 153 (H)  <130 mg/dL Final    Patient Fasting for Lipid? 04/26/2024 Yes   Final   ]      Radiology Imaging:  I reviewed with the patient her X-ray of the knees bilateral   XR KNEE COMPLETE BILAT (ZXA=06862)  Narrative: PROCEDURE: XR KNEE COMPLETE BILAT EM     COMPARISON: None.     INDICATIONS: Chronic pain of both knees.  No injury.     TECHNIQUE: 5 views were obtained.       FINDINGS:   BONES: No acute fracture.  No dislocation.  There is tricompartmental left knee osteoarthrosis, which is most advanced and moderate involving the medial compartment.  The osteoarthrosis is demonstrated by joint space narrowing and marginal osteophyte   formation.  There is mild lateral and patellofemoral compartment osteoarthrosis involving the left knee.  There is mild tricompartmental osteoarthrosis of the right knee.  SOFT  TISSUES: Negative. No visible soft tissue swelling.   EFFUSION: None visible.   OTHER: Negative.               Impression: CONCLUSION:      No acute fracture or dislocation.     Tricompartmental osteoarthrosis of the left knee, which is most advanced and moderate involving the medial compartment.     Mild tricompartmental osteoarthrosis of the right knee.             Dictated by (CST): Jose Hugo MD on 7/30/2024 at 9:59 AM       Finalized by (CST): Jose Hugo MD on 7/30/2024 at 10:00 AM              ASSESSMENT AND PLAN:  Elizabeth is a pleasant 59-year-old female presents with bilateral knee pain due to osteoarthritis and patellofemoral syndrome (left greater than right).  I believe her symptoms are due to osteoarthritis.  I am recommending formal physical therapy and bilateral knee hyaluronic acid and corticosteroid injections under ultrasound guidance.  She should start glucosamine with conjoint as well as turmeric, ice, and Tylenol.  I am not recommending NSAIDs as she has a history of gastritis and gastric ulcers.  I will see her in 2 months but sooner for the knee injections.       RTC in 2 months but sooner for any injections    Discharge Instructions were provided as documented in AVS summary.  The patient was in agreement with the assessment and plan.  All questions were answered.  There were no barriers to learning.         1. NSAID induced gastritis    2. Patellofemoral pain syndrome, unspecified laterality    3. Primary osteoarthritis of knee, unspecified laterality    4. Hyperlipidemia, unspecified hyperlipidemia type        Alex B. Behar MD, [facility]  Physical Medicine and Rehabilitation/Sports Medicine  [facility]

## 2024-10-08 NOTE — PATIENT INSTRUCTIONS
1) Please begin physical therapy as soon as possible.   2) My office will call you to schedule the BILATERAL knee HA and CSI under US once the procedure is approved by your insurance carrier.    3) Take Glucosamine with chondroitin 1500/1200 mg daily.   4) Start tumeric with black pepper 1000 mg twice per day  5)  Ice 20 minutes at a time 3-4 times per day  6) Tylenol 500-1000 mg every 6-8 hours as needed for pain.  No more than 3000 mg daily.  7) I am not recommending the use NSAIDS as there is a history of Gastritis and gastric ulcer.        Patient is a 89y Male seen and evaluated at bedside. NAD, no complaints offered, Kidney function stable, elevated potassium. VSS       Meds:    acetaminophen     Tablet .. 650 every 6 hours PRN  aluminum hydroxide/magnesium hydroxide/simethicone Suspension 30 every 4 hours PRN  amLODIPine   Tablet 10 daily  atorvastatin 40 at bedtime  bisacodyl 5 at bedtime  chlorhexidine 2% Cloths 1 daily  dextrose 10%. 1000 <Continuous>  dextrose 50% Injectable 25 once  dextrose 50% Injectable 12.5 once  dextrose 50% Injectable 25 once  dextrose Oral Gel 15 once  finasteride 5 daily  glucagon  Injectable 1 once  hydrALAZINE 10 two times a day  influenza  Vaccine (HIGH DOSE) 0.7 once  ketotifen 0.025% Ophthalmic Solution 1 daily  melatonin 3 at bedtime PRN  ondansetron Injectable 4 every 8 hours PRN  senna 2 at bedtime  tamsulosin 0.4 at bedtime      T(C): , Max: 37.2 (12-31-22 @ 13:36)  T(F): , Max: 98.9 (12-31-22 @ 13:36)  HR: 81 (01-01-23 @ 08:23)  BP: 132/66 (01-01-23 @ 08:23)  BP(mean): --  RR: 18 (01-01-23 @ 08:23)  SpO2: 98% (01-01-23 @ 08:23)  Wt(kg): --    12-31 @ 07:01  -  01-01 @ 07:00  --------------------------------------------------------  IN: 620 mL / OUT: 150 mL / NET: 470 mL          Review of Systems:  ROS negative except as per HPI      PHYSICAL EXAM:  GENERAL: well-developed, well nourished, alert, no acute distress at present  CHEST/LUNG: Clear to auscultation bilaterally; no accessory muscle use   HEART: normal S1S2, RRR  ABDOMEN: Soft, Nontender, +BS,   EXTREMITIES: No clubbing, cyanosis, or edema       LABS:                        7.9    8.44  )-----------( 144      ( 01 Jan 2023 10:13 )             25.1     01-01    136  |  105  |  34<H>  ----------------------------<  96  5.5<H>   |  24  |  2.91<H>    Ca    7.9<L>      01 Jan 2023 10:13  Phos  5.0     12-31  Mg     2.5     12-31                  RADIOLOGY & ADDITIONAL STUDIES:

## 2024-10-14 ENCOUNTER — TELEPHONE (OUTPATIENT)
Dept: PHYSICAL MEDICINE AND REHAB | Facility: CLINIC | Age: 59
End: 2024-10-14

## 2024-10-14 NOTE — TELEPHONE ENCOUNTER
PATIENT NAME:  STEFFANIE FERNANDEZ/ [phone] (home)/ [phone] (work)  PATIENT :  1965  REFERRAL ID #:  [id number]  REFERRAL STATUS:  Authorized [1]  REVIEW REFERRAL NOTES FOR MORE INFORMATION:  DATE AUTHORIZED:  10/08/2024 // EXPIRATION DATE: 10/08/2025   REFERRED BY:  BEHAR, ALEX, MD (TEL: [phone])  REFERRED TO: No referral provider, MD (TEL: No Referred to Provider on Referral)     No vendor specified on referral. / No vendor phone number known.  No facility specified on referral  REFERRED FOR:    Diagnoses:    K29.60, T39.395A (ICD-10-CM) - 535.40, E935.8 (ICD-9-CM) - NSAID induced gastritis    M22.2X9 (ICD-10-CM) - 719.46 (ICD-9-CM) - Patellofemoral pain syndrome, unspecified laterality    M17.10 (ICD-10-CM) - 715.16 (ICD-9-CM) - Primary osteoarthritis of knee, unspecified laterality    E78.5 (ICD-10-CM) - 272.4 (ICD-9-CM) - Hyperlipidemia, unspecified hyperlipidemia type  Procedures:     [id number] - SPECIALTY (OTHER) - INTERNAL   NUMBER OF VISITS AUTH: 1     Approved: Bilateral knee HA and corticosteroid injection under US    Patient has been scheduled appropriately.

## 2024-11-07 ENCOUNTER — TELEPHONE (OUTPATIENT)
Dept: PHYSICAL THERAPY | Facility: HOSPITAL | Age: 59
End: 2024-11-07

## 2024-11-07 ENCOUNTER — OFFICE VISIT (OUTPATIENT)
Dept: PHYSICAL MEDICINE AND REHAB | Facility: CLINIC | Age: 59
End: 2024-11-07
Payer: COMMERCIAL

## 2024-11-07 DIAGNOSIS — M22.2X9 PATELLOFEMORAL PAIN SYNDROME, UNSPECIFIED LATERALITY: Primary | ICD-10-CM

## 2024-11-07 DIAGNOSIS — M17.10 PRIMARY OSTEOARTHRITIS OF KNEE, UNSPECIFIED LATERALITY: ICD-10-CM

## 2024-11-07 RX ORDER — LIDOCAINE HYDROCHLORIDE 10 MG/ML
14 INJECTION, SOLUTION INFILTRATION; PERINEURAL ONCE
Status: COMPLETED | OUTPATIENT
Start: 2024-11-07 | End: 2024-11-07

## 2024-11-07 RX ORDER — TRIAMCINOLONE ACETONIDE 40 MG/ML
80 INJECTION, SUSPENSION INTRA-ARTICULAR; INTRAMUSCULAR ONCE
Status: COMPLETED | OUTPATIENT
Start: 2024-11-07 | End: 2024-11-07

## 2024-11-07 NOTE — PROCEDURES
[facility]   Knee Joint Injection Procedure Note    CHIEF COMPLAINT:    Chief Complaint   Patient presents with    Injection       PROCEDURE PERFORMED:  BILATERAL knee intra-articular Durolane and corticosteroid injection under ultrasound guidance    INDICATIONS:  BILATERAL knee pain and osteoarthritis    PRIMARY PROCEDURALIST:  Alex Behar, MD    INFORMED CONSENT & TIME OUT:   As documented in the Time Out and Pre-Procedure Check Lists.  Verbal consent was obtained    Vitals: [unfilled]  Labs (document last wbc, plts, hgb, and PT/INR):    No visits with results within 6 Month(s) from this visit.   Latest known visit with results is:   [facility] Lab Encounter on 04/26/2024   Component Date Value Ref Range Status    Cholesterol, Total 04/26/2024 207 (H)  <200 mg/dL Final    HDL Cholesterol 04/26/2024 54  40 - 59 mg/dL Final    Triglycerides 04/26/2024 212 (H)  30 - 149 mg/dL Final    LDL Cholesterol 04/26/2024 116 (H)  <100 mg/dL Final    VLDL 04/26/2024 37 (H)  0 - 30 mg/dL Final    Non HDL Chol 04/26/2024 153 (H)  <130 mg/dL Final    Patient Fasting for Lipid? 04/26/2024 Yes   Final   ]    PROCEDURE:    LEFT knee:    PROCEDURE:  The procedure, risks, benefits and alternatives were discussed with the patient.  Patient verbalized understanding and gave consent.  The LEFT knee was prepped and draped in the usual sterile fashion. Using a linear ultrasound probe, the superolateral patella-femoral joint space was visualized. Using a 30-gauge, 1/2-inch needle, 1 cc of 1% lidocaine was used to numb the skin and soft tissues in the superolateral approach.  The intra-articular space was then accessed using an 18-gauge, 1-1/2-inch needle, which was visualized on ultrasound, using approximately 5 cc of 1% lidocaine to numb the soft tissue.  Once the intra-articular space was visualized on ultrasound, with the needle accessing the space, the syringe was traded for an empty 10 cc syringe  and aspiration was attempted. 0 cc of serous straw colored fluid was removed from the LEFT knee. The Durolane  injection was attached to the needle and injected. During the injection, the Durolane was noted to enter the intra-articular space. The Durolane syringe was then switched out for a syringe containing 2 cc of 1% lidocaine and 1 cc of 40 mg/cc triamcinolone which was injected into the same space. The needle was then removed and hemostasis was achieved.  Skin was cleansed and a dry dressing was placed.    Patient tolerated the procedure well and no immediate complications noted.     RIGHT knee:    PROCEDURE:  The procedure, risks, benefits and alternatives were discussed with the patient.  Patient verbalized understanding and gave consent.  The RIGHT knee was prepped and draped in the usual sterile fashion. Using a linear ultrasound probe, the superolateral patella-femoral joint space was visualized. Using a 30-gauge, 1/2-inch needle, 1 cc of 1% lidocaine was used to numb the skin and soft tissues in the superolateral approach.  The intra-articular space was then accessed using an 18-gauge, 1-1/2-inch needle, which was visualized on ultrasound, using approximately 5 cc of 1% lidocaine to numb the soft tissue.  Once the intra-articular space was visualized on ultrasound, with the needle accessing the space, the syringe was traded for an empty 10 cc syringe and aspiration was attempted. 0 cc of serous straw colored fluid was removed from the RIGHT knee. The Durolane injection was attached to the needle and injected. During the injection, the Durolane was noted to enter the intra-articular space. The Durolane syringe was then switched out for a syringe containing 2 cc of 1% lidocaine and 1 cc of 40 mg/cc triamcinolone which was injected into the same space. The needle was then removed and hemostasis was achieved.  Skin was cleansed and a dry dressing was placed.      Patient tolerated the procedure well and no  immediate complications noted.       Patient verbalized understanding of assessment and plan.  Patient is in agreement with the plan.  All questions were answered.  No barriers to learning identified. Permanent pictures were saved in our PACS systeme.       INSTRUCTIONS GIVEN TO PATIENT:    \"You will see an effect in the next 2-3 days.  Please contact me if you have fevers, worsening swelling, worsening pain, decreased range of motion, increased redness, chills, or anything that makes you concerned about how the joint we injected feels/looks.  If you do not reach me in a reasonable time, please report directly to the emergency room for further evaluation\"    2 months    Alex B. Behar MD, [facility]  Physical Medicine and Rehabilitation/Sports Medicine  [facility]

## 2024-11-07 NOTE — PATIENT INSTRUCTIONS
Instrucciones posteriores a la inyección   No simón nada extenuante khadar los próximos dos días (si recibió dinah inyección en la rodilla, no camine más de 2 woody de la ciudad, no asista a clases de aeróbicos, no corra, no levante objetos pesados, no permanezca de pie por mucho tiempo).  -Puede reanudar aisha actividades cotidianas después de la inyección.  -Es posible que experimente dinah leve hinchazón después del procedimiento.  -Coloque hielo en la articulación que se inyectó al menos 5 a 6 veces al día (15 minutos) khadar dos días después (esto ayudará a prevenir el empeoramiento del dolor que a veces ocurre después de dinah inyección).  -Solo tome tylenol si es necesario para el dolor khadar los primeros días.  -Esté atento a los signos de infección que incluyen enrojecimiento, calor, empeoramiento del dolor, fiebre o escalofríos. Si desarrolla alguno de estos signos, llame a la oficina de inmediato al [phone]  -Todos responden a conrad manera diferente a las inyecciones, mónica puede esperar aisha efectos máximos unas semanas después de conrad última inyección.  Alex B. Behar MD  Physical Medicine and Rehabilitation/Sports Medicine  [facility] [place]

## 2024-11-08 ENCOUNTER — OFFICE VISIT (OUTPATIENT)
Dept: PHYSICAL THERAPY | Age: 59
End: 2024-11-08
Attending: PHYSICAL MEDICINE & REHABILITATION
Payer: COMMERCIAL

## 2024-11-08 DIAGNOSIS — M25.562 BILATERAL KNEE PAIN: Primary | ICD-10-CM

## 2024-11-08 DIAGNOSIS — M25.561 BILATERAL KNEE PAIN: Primary | ICD-10-CM

## 2024-11-08 PROCEDURE — 97110 THERAPEUTIC EXERCISES: CPT

## 2024-11-08 PROCEDURE — 97161 PT EVAL LOW COMPLEX 20 MIN: CPT

## 2024-11-08 PROCEDURE — 97530 THERAPEUTIC ACTIVITIES: CPT

## 2024-11-08 NOTE — PROGRESS NOTES
KNEE EVALUATION:     Diagnosis:   Stiven. Knee pain      Referring Provider: Alex Behar  Date of Evaluation:    11/8/2024    Precautions:   Migraines, OA and High Cholesterol Next MD visit:   1/7/25  Date of Surgery: None     PATIENT SUMMARY   Elizabeth Maier is a 59 year old female who presents to therapy today for stiven. Knee pain for the past 5 months with no specific reason.  Pt describes pain level current 7/10, at best 7/10, at worst 9/10.   Current functional limitations include kneeling and walking at work.    Elizabeth describes prior level of function was independent with her ADLs and IADLs. Pt goals include decreasing her pain.  Past medical history was reviewed with Elizabeth. Significant findings include x-rays were positive for oa.    ASSESSMENT  Elizabeth presents to physical therapy evaluation with primary c/o knee pain. The results of the objective tests and measures show weakness, tightness and gait dysfunction.  Functional deficits include but are not limited to kneeling and walking at work.  Signs and symptoms are consistent with diagnosis of stiven. Knee pain. Pt and PT discussed evaluation findings, pathology, POC and HEP.  Pt voiced understanding and performs HEP correctly without reported pain. Skilled Physical Therapy is medically necessary to address the above impairments and reach functional goals.     OBJECTIVE:   Observation/Skin: Genu valgus and pronation.  Palpation: Left lateral knee pain  Edema:  Swelling at stiven. Knee with visual observation.  Sensation: Intact stiven.    AROM: (* denotes performed with pain)   Knee    Flexion: R 127; L 127   Extension: R -2; L -2       Patellar Mobility/Accessory motion: Stiven. Knee hypomobility in all directions; +2 grades.    Flexibility:   Hamstrings: R Tight; L Tight  Gastroc-soleus: R Tight; L Tight  Piriformis: R Tight; L Tight  Quads: R Tight; L Tight    Strength/MMT: (* denotes performed with pain)  Hip Knee   Flexion: R 4/5; L 4/5  Abduction: R 4/5; L 4/5  Flexion: R 4/5; L 4/5  Extension: R 4/5; L 4/5           Special Tests: Negative stiven.    Gait: pt ambulates on level ground with antalgia and genu valgus with pronation stiven .     Today’s Treatment and Response:   Pt education was provided on exam findings, treatment diagnosis, treatment plan, expectations, and prognosis. Pt was also provided recommendations for activity modifications, postural corrections, and ergonomics. Patient was instructed in and issued a HEP for: Ther. Ex. For 10'- Stiven. HS St. 4x30\"                 Quad Set 10x10\"                 HS Set 10x10\"                 Ther. Act. For 10'- Discussed condition, pt expectations and prognosis.    Charges: 1PT Eval Low Complexity, 1TE and 1TA      Total Timed Treatment: 20 min     Total Treatment Time: 45 min     Based on clinical rationale and outcome measures, this evaluation involved Low Complexity decision making.  PLAN OF CARE:    Goals: (To be met in 10 visits)   Pt will improve quad strength to 5/5 to ascend 1 flight of stairs reciprocally without UE assist  Pt will increase hip and knee strength to grossly 4+/5 to be able to get up and down from the floor safely  Pt will demonstrate increased hip ER/ABD strength to 5/5 to perform stepping and squatting activities without excessive femoral IR/ADD  Pt will improve SLS to >30s to improve safety and independence with gait on uneven surfaces such as grass  Pt will be independent and compliant with comprehensive HEP to maintain progress achieved in PT    Frequency / Duration: Patient will be seen for 2 x/week or a total of 10 visits over a 90 day period.  Treatment will include: Gait training, Manual Therapy, Neuromuscular Re-education, Self-Care Home Management, Therapeutic Activities, Therapeutic Exercise, Home Exercise Program instruction, and Modalities to include: Electrical stimulation (unattended)    Education or treatment limitation: None  Rehab Potential:fair    LEFS Score  LEFS Score:  (Patient-Rptd) 51.25 % (11/4/2024  7:26 PM)      Patient/Family/Caregiver was advised of these findings, precautions, and treatment options and has agreed to actively participate in planning and for this course of care.    Thank you for your referral. Please co-sign or sign and return this letter via fax as soon as possible to [phone]. If you have any questions, please contact me at Dept: [phone]    Sincerely,  Electronically signed by therapist: Ciro Nobles PT, DPT, CSCS  Physician's certification required: Yes  I certify the need for these services furnished under this plan of treatment and while under my care.    X___________________________________________________ Date____________________    Certification From: 11/8/2024  To:2/6/2025

## 2024-11-13 ENCOUNTER — APPOINTMENT (OUTPATIENT)
Dept: PHYSICAL THERAPY | Age: 59
End: 2024-11-13
Attending: PHYSICAL MEDICINE & REHABILITATION
Payer: COMMERCIAL

## 2024-11-15 ENCOUNTER — OFFICE VISIT (OUTPATIENT)
Dept: PHYSICAL THERAPY | Age: 59
End: 2024-11-15
Attending: PHYSICAL MEDICINE & REHABILITATION
Payer: COMMERCIAL

## 2024-11-15 DIAGNOSIS — M25.561 BILATERAL KNEE PAIN: Primary | ICD-10-CM

## 2024-11-15 DIAGNOSIS — M25.562 BILATERAL KNEE PAIN: Primary | ICD-10-CM

## 2024-11-15 PROCEDURE — 97110 THERAPEUTIC EXERCISES: CPT

## 2024-11-15 PROCEDURE — 97112 NEUROMUSCULAR REEDUCATION: CPT

## 2024-11-15 NOTE — PROGRESS NOTES
Diagnosis:   Bilateral knee pain       Referring Provider: Alex Behar  Date of Evaluation:    11/8/24    Precautions:  Migraines, OA and High Cholesterol  Next MD visit:   none scheduled  Date of Surgery: n/a   Insurance Primary/Secondary: [employer] [place] / N/A     # Auth Visits: 10            Subjective: Patient reports left knee pain this afternoon. She has been doing her HEP and felt ok after her initial eval.    Pain: 4/10      Objective: Observation/Skin: Genu valgus and pronation.  Palpation: Left lateral knee pain  Edema:  Swelling at stiven. Knee with visual observation.  Sensation: Intact stiven.     AROM: (* denotes performed with pain)   Knee    Flexion: R 127; L 127   Extension: R -2; L -2         Patellar Mobility/Accessory motion: Stiven. Knee hypomobility in all directions; +2 grades.     Flexibility:   Hamstrings: R Tight; L Tight  Gastroc-soleus: R Tight; L Tight  Piriformis: R Tight; L Tight  Quads: R Tight; L Tight     Strength/MMT: (* denotes performed with pain)  Hip Knee   Flexion: R 4/5; L 4/5  Abduction: R 4/5; L 4/5 Flexion: R 4/5; L 4/5  Extension: R 4/5; L 4/5                         Special Tests: Negative stiven.     Gait: pt ambulates on level ground with antalgia and genu valgus with pronation stiven .       Assessment: Patient presents with stiven. Le tightness during her manual stretching. The patient also presents with stiven. Le weakness during her table exercises. She did not have any pain after her treatment.      Goals: (To be met in 10 visits)   Pt will improve quad strength to 5/5 to ascend 1 flight of stairs reciprocally without UE assist  Pt will increase hip and knee strength to grossly 4+/5 to be able to get up and down from the floor safely  Pt will demonstrate increased hip ER/ABD strength to 5/5 to perform stepping and squatting activities without excessive femoral IR/ADD  Pt will improve SLS to >30s to improve safety and independence with gait on uneven surfaces such as grass  Pt will be  independent and compliant with comprehensive HEP to maintain progress achieved in PT    Plan: Plan to work on rom, stretching and strengthening  Date: 11/15/2024  TX#: 2/10 Date:                 TX#: 3/ Date:                 TX#: 4/ Date:                 TX#: 5/ Date:   Tx#: 6/   Ther3. Ex.: 30'  Bike 8' L3  Stiven. HS, SKC and Calf St. 4x30\"xea.  Quad Set 10x10\"  HS Set 10x10\"  Heel Slides 20x5\"  Standing Calf St. 3x30\"  Standing Quad St. 4x30\"       Neuro Re-ed.: 15'  SLR 20xea.  S/L Abd. 20xea.  LAQs 20xea.       HEP: Reviewed her original HEP.    Charges: 2TE and 1Neuro       Total Timed Treatment: 45 min  Total Treatment Time: 45 min

## 2024-11-21 RX ORDER — EZETIMIBE 10 MG/1
10 TABLET ORAL NIGHTLY
Qty: 90 TABLET | Refills: 0 | Status: SHIPPED | OUTPATIENT
Start: 2024-11-21

## 2024-11-22 ENCOUNTER — OFFICE VISIT (OUTPATIENT)
Dept: PHYSICAL THERAPY | Age: 59
End: 2024-11-22
Attending: PHYSICAL MEDICINE & REHABILITATION
Payer: COMMERCIAL

## 2024-11-22 PROCEDURE — 97112 NEUROMUSCULAR REEDUCATION: CPT | Performed by: PHYSICAL THERAPIST

## 2024-11-22 PROCEDURE — 97110 THERAPEUTIC EXERCISES: CPT | Performed by: PHYSICAL THERAPIST

## 2024-11-22 NOTE — PROGRESS NOTES
Diagnosis: Patellofemoral pain syndrome, unspecified laterality (M22.2X9)  Primary osteoarthritis of knee, unspecified laterality (M17.10)  Hyperlipidemia, unspecified hyperlipidemia type (E78.5)        Next MD visit: none scheduled  Fall Risk: standard         Precautions: n/a          Medication Changes since last visit?: No      Subjective: Still complains of L knee pain. States she walks a lot during the day.  Pt describes pain level 4/10.     Objective:  L knee ROM 0-130  L LE strength grossly 4/5 into knee flexion and extension    Assessment: Tolerated progression of therapeutic exercises. Patient and PT goals in progress      Plan: Continue PT to improve mobility.     11/22/2024  Visit#: 3/5 [facility]   Thera Ex   X40min  - calf stretch on slant board L3 x 30 sec x 3  - B LE heel raises on L2 slant board  - L/R knee flexion stretch on 16 inch step 10 x 5 sec hold  - LE hamstring curls 2lb wts 10 x 2  - LE abduction, hip and knee flexion with 2lb wts 10 x 2  - Shuttle B LE leg press 5 bands 10 x 3  - Shuttle single LE leg press 4 bands 10 x 2  - neuro re-ed     Manual PT      Neuromuscular Re-education   X8min to improve LE stability and control  - side to side rocker board x 2min  - fwd / bkw rocker board x 2min  - forward slow step up on BOSU 10 x 2  - rocker board squat 10 x 2     Modalities                      Charges: EX3, Neuro re-ed1       Total Timed Treatment: 48 min  Total Treatment Time: 48 min

## 2024-11-25 RX ORDER — EZETIMIBE 10 MG/1
10 TABLET ORAL NIGHTLY
Qty: 90 TABLET | Refills: 0 | OUTPATIENT
Start: 2024-11-25

## 2024-11-29 ENCOUNTER — APPOINTMENT (OUTPATIENT)
Dept: PHYSICAL THERAPY | Age: 59
End: 2024-11-29
Attending: PHYSICAL MEDICINE & REHABILITATION
Payer: COMMERCIAL

## 2024-12-06 ENCOUNTER — OFFICE VISIT (OUTPATIENT)
Dept: PHYSICAL THERAPY | Age: 59
End: 2024-12-06
Attending: PHYSICAL MEDICINE & REHABILITATION
Payer: COMMERCIAL

## 2024-12-06 PROCEDURE — 97112 NEUROMUSCULAR REEDUCATION: CPT | Performed by: PHYSICAL THERAPIST

## 2024-12-06 PROCEDURE — 97110 THERAPEUTIC EXERCISES: CPT | Performed by: PHYSICAL THERAPIST

## 2024-12-06 NOTE — PROGRESS NOTES
Diagnosis: Patellofemoral pain syndrome, unspecified laterality (M22.2X9)  Primary osteoarthritis of knee, unspecified laterality (M17.10)  Hyperlipidemia, unspecified hyperlipidemia type (E78.5)        Next MD visit: none scheduled  Fall Risk: standard         Precautions: n/a          Medication Changes since last visit?: No      Subjective:  States she is feeling better overall but still complains of pain on back of L knee with prolonged walking.  Pt describes pain level 4-5/10.     Objective:  L knee ROM 0-130  L LE strength grossly 4/5 into knee flexion and extension    Assessment: Tolerated progression of therapeutic exercises. Patient and PT goals in progress      Plan: Continue PT to improve mobility.     12/6/2024  Visit#: 4/5 [facility] 11[facility]22/2024  [facility]   [facility]   X38min  - calf stretch on slant board L3 x 30 sec x 3  - B LE heel raises on L2 slant board  - L/R knee flexion stretch on 16 inch step 10 x 5 sec hold  - LE hamstring curls AROM 10 x 2  - LE abduction, hip and knee flexion with 2lb wts 10 x 2  - Shuttle B LE leg press 5 bands 10 x 3  - Shuttle single LE leg press 4 bands 10 x 2  - neuro re-ed  - Nustep L5 x 10min  - repeat calf stretch   X40min  - calf stretch on slant board L3 x 30 sec x 3  - B LE heel raises on L2 slant board  - L/R knee flexion stretch on 16 inch step 10 x 5 sec hold  - LE hamstring curls 2lb wts 10 x 2  - LE abduction, hip and knee flexion with 2lb wts 10 x 2  - Shuttle B LE leg press 5 bands 10 x 3  - Shuttle single LE leg press 4 bands 10 x 2  - neuro re-ed     Manual PT       Neuromuscular Re-education   X8min to improve LE stability and control  - side to side rocker board x 2min  - fwd / bkw rocker board x 2min  - forward slow step up on BOSU 10 x 2  - rocker board squat 10 x 2 X8min to improve LE stability and control  - side to side rocker board x 2min  - fwd / bkw rocker board x 2min  - forward slow step up on BOSU 10 x 2  - rocker board squat 10 x 2      Modalities                         Charges: EX3, Neuro re-ed1       Total Timed Treatment: 46 min  Total Treatment Time: 46 min

## 2024-12-13 ENCOUNTER — OFFICE VISIT (OUTPATIENT)
Dept: PHYSICAL THERAPY | Age: 59
End: 2024-12-13
Attending: PHYSICAL MEDICINE & REHABILITATION
Payer: COMMERCIAL

## 2024-12-13 PROCEDURE — 97110 THERAPEUTIC EXERCISES: CPT | Performed by: PHYSICAL THERAPIST

## 2024-12-13 PROCEDURE — 97112 NEUROMUSCULAR REEDUCATION: CPT | Performed by: PHYSICAL THERAPIST

## 2024-12-13 NOTE — PROGRESS NOTES
Diagnosis: Patellofemoral pain syndrome, unspecified laterality (M22.2X9)  Primary osteoarthritis of knee, unspecified laterality (M17.10)  Hyperlipidemia, unspecified hyperlipidemia type (E78.5)        Next MD visit: none scheduled  Fall Risk: standard         Precautions: n/a          Medication Changes since last visit?: No      Subjective:  States her knee is feeling good.  Pt describes pain level 4-5/10.     Objective:  L knee ROM 0-130  L LE strength grossly 4/5 into knee flexion and extension    Assessment: Referred to PT due to initial complaints of LE and knee pain. Patient has completed 5 PT visits and states her knee and leg is feeling better. Reports 0/10 pain. B LE ROM is WFL into all planes. B LE strength is grossly 5/5 throughout. Patient and PT goals have been met.     12/13/2024  VIsit#: 5/5 [facility] 12/6/2024  [facility]  [facility]   Thera Ex   X38min  - calf stretch on slant board L3 x 30 sec x 3  - B LE heel raises on L2 slant board  - L/R knee flexion stretch on 16 inch step 10 x 5 sec hold  - LE hamstring curls AROM 10 x 2  - LE abduction, hip and knee flexion with 3lb wts 10 x 2  - Shuttle B LE leg press 5 bands 10 x 3  - Shuttle single LE leg press 4 bands 10 x 2  - neuro re-ed  - Nustep L5 x 10min  - repeat calf stretch   X38min  - calf stretch on slant board L3 x 30 sec x 3  - B LE heel raises on L2 slant board  - L/R knee flexion stretch on 16 inch step 10 x 5 sec hold  - LE hamstring curls AROM 10 x 2  - LE abduction, hip and knee flexion with 2lb wts 10 x 2  - Shuttle B LE leg press 5 bands 10 x 3  - Shuttle single LE leg press 4 bands 10 x 2  - neuro re-ed  - Nustep L5 x 10min  - repeat calf stretch   X40min  - calf stretch on slant board L3 x 30 sec x 3  - B LE heel raises on L2 slant board  - L/R knee flexion stretch on 16 inch step 10 x 5 sec hold  - LE hamstring curls 2lb wts 10 x 2  - LE abduction, hip and knee flexion with 2lb wts 10 x 2  - Shuttle B LE  leg press 5 bands 10 x 3  - Shuttle single LE leg press 4 bands 10 x 2  - neuro re-ed     Manual PT        Neuromuscular Re-education   X10min to improve LE stability and control  - side to side rocker board x 2min  - fwd / bkw rocker board x 2min  - forward slow step up on BOSU 10 x 2  - rocker board squat 10 x 2 X8min to improve LE stability and control  - side to side rocker board x 2min  - fwd / bkw rocker board x 2min  - forward slow step up on BOSU 10 x 2  - rocker board squat 10 x 2 X8min to improve LE stability and control  - side to side rocker board x 2min  - fwd / bkw rocker board x 2min  - forward slow step up on BOSU 10 x 2  - rocker board squat 10 x 2     Modalities                          Charges: EX3, Neuro re-ed1       Total Timed Treatment: 48 min  Total Treatment Time: 48 min    LEFS Score  LEFS Score: (Patient-Rptd) 51.25 % (11/4/2024  7:26 PM)    Post LEFS Score  Post LEFS Score: 80 % (12/13/2024 10:48 AM)    28.75 % improvement    Plan: Discharged from PT. Patient will continue with HEP and follow up with MD as needed.    Patient/Family/Caregiver was advised of these findings, precautions, and treatment options and has agreed to actively participate in planning and for this course of care.    Thank you for your referral. If you have any questions, please contact me at Dept: [phone].    Sincerely,  Electronically signed by therapist: Keith Vallejo Jr., PT     Certification From: 12/13/2024  To:3/13/2025

## 2025-01-07 ENCOUNTER — TELEPHONE (OUTPATIENT)
Dept: PHYSICAL MEDICINE AND REHAB | Facility: CLINIC | Age: 60
End: 2025-01-07

## 2025-01-07 ENCOUNTER — OFFICE VISIT (OUTPATIENT)
Dept: PHYSICAL MEDICINE AND REHAB | Facility: CLINIC | Age: 60
End: 2025-01-07
Payer: COMMERCIAL

## 2025-01-07 VITALS — WEIGHT: 154 LBS | BODY MASS INDEX: 29 KG/M2

## 2025-01-07 DIAGNOSIS — M17.10 PRIMARY OSTEOARTHRITIS OF KNEE, UNSPECIFIED LATERALITY: ICD-10-CM

## 2025-01-07 DIAGNOSIS — M22.2X9 PATELLOFEMORAL PAIN SYNDROME, UNSPECIFIED LATERALITY: Primary | ICD-10-CM

## 2025-01-07 DIAGNOSIS — K29.60 NSAID INDUCED GASTRITIS: ICD-10-CM

## 2025-01-07 DIAGNOSIS — E78.5 HYPERLIPIDEMIA, UNSPECIFIED HYPERLIPIDEMIA TYPE: ICD-10-CM

## 2025-01-07 DIAGNOSIS — T39.395A NSAID INDUCED GASTRITIS: ICD-10-CM

## 2025-01-07 PROCEDURE — 99214 OFFICE O/P EST MOD 30 MIN: CPT | Performed by: PHYSICAL MEDICINE & REHABILITATION

## 2025-01-07 NOTE — PATIENT INSTRUCTIONS
1) My office will call you to schedule the BILATERAL knee CSI under US after 2/7/25 once the procedure is approved by your insurance carrier.    2) Use Tramadol only if needed  3) Tylenol 500-1000 mg every 6-8 hours as needed for pain.  No more than 3000 mg daily.  4) Continue home exercise program   5)  Ice 20 minutes at a time 3-4 times per day  6) Schedule the knee injection for February and if symptoms continue to be well controlled, then can cancel injection

## 2025-01-07 NOTE — TELEPHONE ENCOUNTER
Initiated authorization for BILATERAL knee CSI under US after 2/7/25. CPT/HCPCS 00507-33,  x's 2 dx:M17.0 with at [facility].    Per the automated line stating PA is based on a referral from the PCP to the treating physician will need to be placed  Message to Dr. Ravindra Lino to place another referral to Dr. Behar with the notes stating:  BILATERAL knee CSI under US   Please use CPT/HCPCS 15017-36,  x's 2 and Dx:M17.0    Thanks

## 2025-01-07 NOTE — PROGRESS NOTES
[facility]  Progress Note    CHIEF COMPLAINT:    Chief Complaint   Patient presents with    Follow - Up     Lov 11/7/24 follow after BILATERAL knee intra-articular Durolane and corticosteroid injection under ultrasound guidance 80% Improvement.Pain 8/10. No T/N. Completed PT with relief.Continues HEP with relief. Tramadol prn with relief.        History of Present Illness:  The patient is a 59 year old right-handed female with a significant past medical history of hyperlipidemia, GERD, and gastric ulcer who presents with bilateral knee pain (left greater than right).  She is status post bilateral knee hyaluronic acid and corticosteroid injections on November 7, 2024 with about 80 to 90% improvement in her symptoms.  Today she rates her pain a 0 out of 10 in her knees.  She has intermittently been taking tramadol.  She denies any radicular symptoms including popping or clicking.  She has been compliant with a home exercise program.  She is taking tramadol only if she really needs it which has been about 2-3 times since the prescription.    PAST MEDICAL HISTORY:  Past Medical History:    Colon adenomas    repeat colonoscopy 1/2021    High cholesterol    Hyperlipidemia    Migraine    LULU (obstructive sleep apnea)    Sleep apnea       SURGICAL HISTORY:  Past Surgical History:   Procedure Laterality Date    Colonoscopy N/A 03/23/2021    Procedure: COLONOSCOPY;  Surgeon: Pranav Henriquez MD;  Location: [facility]    Eg  10/13/2023       SOCIAL HISTORY:   Social History     Occupational History    Not on file   Tobacco Use    Smoking status: Never    Smokeless tobacco: Never   Vaping Use    Vaping status: Never Used   Substance and Sexual Activity    Alcohol use: No     Alcohol/week: 0.0 standard drinks of alcohol    Drug use: No    Sexual activity: Not on file       FAMILY HISTORY:   Family History   Problem Relation Age of Onset    Cancer Father        CURRENT  MEDICATIONS:   Current Outpatient Medications   Medication Sig Dispense Refill    EZETIMIBE 10 MG Oral Tab TAKE ONE TABLET BY MOUTH EVERY NIGHT 90 tablet 0    PANTOPRAZOLE 40 MG Oral Tab EC TAKE ONE TABLET BY MOUTH EVERY DAY IN THE MORNING BEFORE BREAKFAST 90 tablet 1    traMADol 50 MG Oral Tab Take 1 tablet (50 mg total) by mouth 2 (two) times daily as needed for Pain. 60 tablet 0    pravastatin 10 MG Oral Tab Take 1 tablet (10 mg total) by mouth nightly. 90 tablet 2    propranolol 20 MG Oral Tab Take 1 tablet (20 mg total) by mouth 2 (two) times daily. 180 tablet 3    Rimegepant Sulfate (NURTEC) 75 MG Oral Tablet Dispersible Take 75 mg by mouth as needed. Take one tablet at onset of migraine.  Maximum dose in 24 hours is 1 tablet (75mg). 8 tablet 11    LORazepam 1 MG Oral Tab Take 1 tablet (1 mg total) by mouth one time. Prior to MRI      alendronate 70 MG Oral Tab Take 1 tablet (70 mg total) by mouth once a week. 4 tablet 0       ALLERGIES:   Allergies[1]    REVIEW OF SYSTEMS:   Review of Systems   Constitutional: Negative.    HENT: Negative.    Eyes: Negative.    Respiratory: Negative.    Cardiovascular: Negative.    Gastrointestinal: Negative.    Genitourinary: Negative.    Musculoskeletal: As per HPI  Skin: Negative.    Neurological: As per HPI  Endo/Heme/Allergies: Negative.    Psychiatric/Behavioral: Negative.      All other systems reviewed and are negative. Pertinent positives and negatives noted in the HPI.        PHYSICAL EXAM:   Wt 154 lb (69.9 kg)   BMI 29.10 kg/m²     Body mass index is 29.1 kg/m².      General: No immediate distress  Head: Normocephalic/ Atraumatic  Eyes: Extra-occular movements intact.   Ears: No auricular hematoma or deformities  Mouth: No lesions or ulcerations  Heart: peripheral pulses intact. Normal capillary refill.   Lungs: Non-labored respirations  Abdomen: No abdominal guarding  Extremities: No lower extremity edema bilaterally   Skin: No lesions noted.   Cognition: alert &  oriented x 3, attentive, able to follow 2 step commands, comprehention intact, spontaneous speech intact  Motor:    Musculoskeletal:        Data  No visits with results within 6 Month(s) from this visit.   Latest known visit with results is:   [facility] Lab Encounter on 04/26/2024   Component Date Value Ref Range Status    Cholesterol, Total 04/26/2024 207 (H)  <200 mg/dL Final    HDL Cholesterol 04/26/2024 54  40 - 59 mg/dL Final    Triglycerides 04/26/2024 212 (H)  30 - 149 mg/dL Final    LDL Cholesterol 04/26/2024 116 (H)  <100 mg/dL Final    VLDL 04/26/2024 37 (H)  0 - 30 mg/dL Final    Non HDL Chol 04/26/2024 153 (H)  <130 mg/dL Final    Patient Fasting for Lipid? 04/26/2024 Yes   Final   ]      Radiology Imaging:  I reviewed with the patient her X-ray of the knees bilateral   XR KNEE COMPLETE BILAT (SQD=99961)  Narrative: PROCEDURE: XR KNEE COMPLETE BILAT EM     COMPARISON: None.     INDICATIONS: Chronic pain of both knees.  No injury.     TECHNIQUE: 5 views were obtained.       FINDINGS:   BONES: No acute fracture.  No dislocation.  There is tricompartmental left knee osteoarthrosis, which is most advanced and moderate involving the medial compartment.  The osteoarthrosis is demonstrated by joint space narrowing and marginal osteophyte   formation.  There is mild lateral and patellofemoral compartment osteoarthrosis involving the left knee.  There is mild tricompartmental osteoarthrosis of the right knee.  SOFT TISSUES: Negative. No visible soft tissue swelling.   EFFUSION: None visible.   OTHER: Negative.               Impression: CONCLUSION:      No acute fracture or dislocation.     Tricompartmental osteoarthrosis of the left knee, which is most advanced and moderate involving the medial compartment.     Mild tricompartmental osteoarthrosis of the right knee.             Dictated by (CST): Jose Hugo MD on 7/30/2024 at 9:59 AM       Finalized by (CST): Jose Hugo MD on 7/30/2024 at 10:00 AM               ASSESSMENT AND PLAN:  Elizabeth is a pleasant 59-year-old female presents for follow-up of her bilateral knee pain due to osteoarthritis and patellofemoral syndrome.  I am recommending repeating bilateral knee corticosteroid injections under ultrasound guidance anytime after February 7, 2025.  I have put an order for this.  If her symptoms continue to be contained, then we can consider holding off on the procedure.  She should use Tylenol and ice for pain.  She can also use tramadol as needed for severe pain.  I will see her again for her knee injections anytime after February 7.       RTC in as needed for knee injections  Discharge Instructions were provided as documented in AVS summary.  The patient was in agreement with the assessment and plan.  All questions were answered.  There were no barriers to learning.         1. Patellofemoral pain syndrome, unspecified laterality    2. Primary osteoarthritis of knee, unspecified laterality    3. NSAID induced gastritis    4. Hyperlipidemia, unspecified hyperlipidemia type        Alex B. Behar MD  Physical Medicine and Rehabilitation/Sports Medicine  [facility]         [1]   Allergies  Allergen Reactions    No Known Allergies MYALGIA

## 2025-01-08 NOTE — TELEPHONE ENCOUNTER
Message routed to Wallace Rule CMA re: fact that procedural PA's are not the PCP office's responsibility. We place original refer to specialist and work to get that auth'd only.  [facility] Managed care can assist her if she has questions/ needs help in obtaining a procedural auth.

## 2025-01-27 ENCOUNTER — TELEPHONE (OUTPATIENT)
Dept: INTERNAL MEDICINE CLINIC | Facility: CLINIC | Age: 60
End: 2025-01-27

## 2025-01-27 ENCOUNTER — PATIENT MESSAGE (OUTPATIENT)
Dept: INTERNAL MEDICINE CLINIC | Facility: CLINIC | Age: 60
End: 2025-01-27

## 2025-01-27 NOTE — TELEPHONE ENCOUNTER
Left voice msg asking patient to send us images of her rash through [employer]. I offer an opening this Wednesday at 2 o'clock. If patient agrees please add her to the schedule.

## 2025-01-27 NOTE — TELEPHONE ENCOUNTER
Pt called stating that she has a bad rash on her neck and wants for pcp to prescribe some medication    Please advise

## 2025-01-28 RX ORDER — TRIAMCINOLONE ACETONIDE 5 MG/G
1 CREAM TOPICAL 3 TIMES DAILY
Qty: 1 EACH | Refills: 1 | Status: SHIPPED | OUTPATIENT
Start: 2025-01-28

## 2025-01-28 NOTE — TELEPHONE ENCOUNTER
Spoke with Elizabeth, patient agreed to do a video visit tomorrow at 4 o'clock with Dr Lino. She she was notified her medication was sent.

## 2025-01-28 NOTE — TELEPHONE ENCOUNTER
Patient is calling in stating she would like an ointment. Please call to advise.   Patient states she is at work, please leave voicemail if she does not answer.

## 2025-01-28 NOTE — TELEPHONE ENCOUNTER
Appears to be a contact dermatitis.  Will send triamcinolone.  Should schedule virtual visit to re-evaluated      Dr. Lino

## 2025-01-29 ENCOUNTER — TELEMEDICINE (OUTPATIENT)
Dept: INTERNAL MEDICINE CLINIC | Facility: CLINIC | Age: 60
End: 2025-01-29
Payer: COMMERCIAL

## 2025-01-29 DIAGNOSIS — L25.9 CONTACT DERMATITIS, UNSPECIFIED CONTACT DERMATITIS TYPE, UNSPECIFIED TRIGGER: Primary | ICD-10-CM

## 2025-01-29 DIAGNOSIS — H92.02 LEFT EAR PAIN: ICD-10-CM

## 2025-01-29 PROCEDURE — 99214 OFFICE O/P EST MOD 30 MIN: CPT | Performed by: INTERNAL MEDICINE

## 2025-01-29 RX ORDER — NEOMYCIN/POLYMYXIN B/HYDROCORT 3.5-10K-1
1 SUSPENSION, DROPS(FINAL DOSAGE FORM)(ML) OPHTHALMIC (EYE) EVERY 6 HOURS SCHEDULED
Qty: 7.5 ML | Refills: 1 | Status: SHIPPED | OUTPATIENT
Start: 2025-01-29 | End: 2025-02-03

## 2025-01-29 NOTE — PROGRESS NOTES
[facility]  Virtual Telephone Note    History of Present Illness    Elizabeth Maier verbally consents to a Virtual/Telephone Check-In visit on 1/29/2025        Patient understands and accepts financial responsibility for any deductible, co-insurance and/or co-pays associated with this service.    Duration of the service: 35 minutes      Summary of topics discussed:     Rash on chest and ear pain left with itching in canal.        Elizabeth Maier is a 59 year old female whome sets up telephone encounter to discuss:    Has a significant  has a past medical history of Colon adenomas (01/03/2018), High cholesterol, Hyperlipidemia, Migraine, LULU (obstructive sleep apnea) (06/23/2023), and Sleep apnea.    Symptoms started about 7 days ago.      Results for orders placed or performed in visit on 04/26/24   Lipid Panel [E]    Collection Time: 04/26/24  8:56 AM   Result Value Ref Range    Cholesterol, Total 207 (H) <200 mg/dL    HDL Cholesterol 54 40 - 59 mg/dL    Triglycerides 212 (H) 30 - 149 mg/dL    LDL Cholesterol 116 (H) <100 mg/dL    VLDL 37 (H) 0 - 30 mg/dL    Non HDL Chol 153 (H) <130 mg/dL    Patient Fasting for Lipid? Yes        LABS:   CMP:  Lab Results   Component Value Date/Time     11/11/2023 03:17 AM    K 3.7 11/11/2023 03:17 AM     11/11/2023 03:17 AM    CO2 21.0 11/11/2023 03:17 AM    CREATSERUM 0.70 11/11/2023 03:17 AM    CA 9.8 11/11/2023 03:17 AM     (H) 11/11/2023 03:17 AM    TP 7.3 11/11/2023 03:17 AM    ALB 4.7 11/11/2023 03:17 AM    ALKPHO 111 11/11/2023 03:17 AM    AST 22 11/11/2023 03:17 AM    ALT 18 11/11/2023 03:17 AM    BILT 0.7 11/11/2023 03:17 AM    TSH 5.620 (H) 01/03/2020 08:50 AM    T4F 1.1 01/03/2020 08:50 AM        Hemoglobin A1C, Microalbumin  No results found for: \"A1C\"     Lipid panel  Lab Results   Component Value Date/Time    CHOLEST 207 (H) 04/26/2024 08:56 AM    HDL 54 04/26/2024 08:56 AM    TRIG 212 (H) 04/26/2024 08:56 AM     (H)  04/26/2024 08:56 AM    NONHDLC 153 (H) 04/26/2024 08:56 AM        Medications:  Current Outpatient Medications   Medication Sig Dispense Refill    Neomycin-Polymyxin-HC 3.5-37114-4 Ophthalmic Suspension Place 1 drop into the left eye every 6 (six) hours. 7.5 mL 1    triamcinolone 0.5 % External Cream Apply 1 Application topically 3 (three) times daily. 1 each 1    EZETIMIBE 10 MG Oral Tab TAKE ONE TABLET BY MOUTH EVERY NIGHT 90 tablet 0    PANTOPRAZOLE 40 MG Oral Tab EC TAKE ONE TABLET BY MOUTH EVERY DAY IN THE MORNING BEFORE BREAKFAST 90 tablet 1    traMADol 50 MG Oral Tab Take 1 tablet (50 mg total) by mouth 2 (two) times daily as needed for Pain. 60 tablet 0    pravastatin 10 MG Oral Tab Take 1 tablet (10 mg total) by mouth nightly. 90 tablet 2    propranolol 20 MG Oral Tab Take 1 tablet (20 mg total) by mouth 2 (two) times daily. 180 tablet 3    Rimegepant Sulfate (NURTEC) 75 MG Oral Tablet Dispersible Take 75 mg by mouth as needed. Take one tablet at onset of migraine.  Maximum dose in 24 hours is 1 tablet (75mg). 8 tablet 11    LORazepam 1 MG Oral Tab Take 1 tablet (1 mg total) by mouth one time. Prior to MRI      alendronate 70 MG Oral Tab Take 1 tablet (70 mg total) by mouth once a week. 4 tablet 0      PMH:  Past Medical History:    Colon adenomas    repeat colonoscopy 1/2021    High cholesterol    Hyperlipidemia    Migraine    LULU (obstructive sleep apnea)    Sleep apnea         PSH:  Past Surgical History:   Procedure Laterality Date    Colonoscopy N/A 03/23/2021    Procedure: COLONOSCOPY;  Surgeon: Pranav Henriquez MD;  Location: [facility]    Egd  10/13/2023       Allergies:  Allergies[1]   Social History:  Social History     Socioeconomic History    Marital status:    Tobacco Use    Smoking status: Never    Smokeless tobacco: Never   Vaping Use    Vaping status: Never Used   Substance and Sexual Activity    Alcohol use: No     Alcohol/week: 0.0 standard drinks of alcohol    Drug use: No       Family History:  Family History   Problem Relation Age of Onset    Cancer Father               REVIEW OF SYSTEMS:   Review of Systems   Constitutional:  Negative for chills, fatigue, fever and unexpected weight change.   HENT:  Positive for ear pain. Negative for congestion, hearing loss, rhinorrhea, sinus pain and sore throat.    Eyes:  Negative for pain, redness and visual disturbance.   Respiratory:  Negative for apnea, cough, chest tightness, shortness of breath and wheezing.    Cardiovascular:  Negative for chest pain, palpitations and leg swelling.   Gastrointestinal:  Negative for abdominal distention, abdominal pain, blood in stool, constipation and nausea.   Endocrine: Negative for cold intolerance, heat intolerance and polyuria.   Genitourinary:  Negative for dysuria, hematuria and urgency.   Musculoskeletal:  Negative for arthralgias, back pain, gait problem, joint swelling, myalgias and neck pain.   Skin:  Negative for rash and wound.        Rash on chest   Allergic/Immunologic: Negative for food allergies and immunocompromised state.   Neurological:  Negative for dizziness, seizures, facial asymmetry, speech difficulty, weakness, light-headedness, numbness and headaches.   Hematological:  Negative for adenopathy. Does not bruise/bleed easily.   Psychiatric/Behavioral:  Negative for behavioral problems, sleep disturbance and suicidal ideas. The patient is not nervous/anxious.             PHYSICAL EXAM:   There were no vitals taken for this visit. Estimated body mass index is 29.1 kg/m² as calculated from the following:    Height as of 7/29/24: 5' 1\" (1.549 m).    Weight as of 1/7/25: 154 lb (69.9 kg).     Wt Readings from Last 3 Encounters:   01/07/25 154 lb (69.9 kg)   10/08/24 154 lb (69.9 kg)   07/29/24 154 lb 9.6 oz (70.1 kg)       Physical Exam    PE: Appears AxOx3, no increased work of breathing, speaking in full sentences  SKIN: Macular rash on chest area.        ASSESSMENT AND PLAN:   Patient  is a 59 year old female who presents primarily presents for:    1. Contact dermatitis, unspecified contact dermatitis type, unspecified trigger  Patient will  triamcinolone today.  Rash likely a contact dermatitis.      2. Left ear pain  Polymycin neomycin drops prescribed.  Will treat for otitis externa.        Meds & Refills for this Visit:  Requested Prescriptions     Signed Prescriptions Disp Refills    Neomycin-Polymyxin-HC 3.5-49470-4 Ophthalmic Suspension 7.5 mL 1     Sig: Place 1 drop into the left eye every 6 (six) hours.       No orders of the defined types were placed in this encounter.      Imaging & Consults:  None          No follow-ups on file.  Important follow up notes/labs for next visit          Reasurrance and education provided. All questions answered. Red flags/ ER precautions discussed.  The patient understands and accepts the limitations of the use of telemedicine.  Please note that the following visit was completed using two-way, real-time interactive audio and/or video communication.  This has been done in good jessenia to provide continuity of care in the best interest of the provider-patient relationship, due to the ongoing public health crisis/national emergency and because of restrictions of visitation.  There are limitations of this visit as no physical exam could be performed.  Every conscious effort was taken to allow for sufficient and adequate time.  This billing was spent on reviewing labs, medications, radiology tests and decision making.  Appropriate medical decision-making and tests are ordered as detailed in the plan of care above.      As part of our commitment to providing you with comprehensive, transparent, and timely access to your health information, we adhere to the guidelines set forth by the 21st Century Cures Act. This Act enhances your rights to access your electronic health information and ensures that you can easily obtain your medical records.  Please note  that the verbage used in this note is intended for medical documentation and communication and may be interpreted as forthright.  Please do not hesitate to contact my office if you have any questions.        Ravindra Lino MD  Internal Medicine/Primary Care  [facility]                       [1]   Allergies  Allergen Reactions    No Known Allergies MYALGIA

## 2025-02-02 ENCOUNTER — PATIENT MESSAGE (OUTPATIENT)
Dept: INTERNAL MEDICINE CLINIC | Facility: CLINIC | Age: 60
End: 2025-02-02

## 2025-02-03 RX ORDER — NEOMYCIN/POLYMYXIN B/HYDROCORT 3.5-10K-1
1 SUSPENSION, DROPS(FINAL DOSAGE FORM)(ML) OPHTHALMIC (EYE) EVERY 6 HOURS SCHEDULED
Qty: 7.5 ML | Refills: 1 | Status: SHIPPED | OUTPATIENT
Start: 2025-02-03

## 2025-02-17 RX ORDER — PRAVASTATIN SODIUM 10 MG
10 TABLET ORAL NIGHTLY
Qty: 90 TABLET | Refills: 2 | Status: SHIPPED | OUTPATIENT
Start: 2025-02-17

## 2025-02-27 ENCOUNTER — TELEPHONE (OUTPATIENT)
Age: 60
End: 2025-02-27

## 2025-02-27 ENCOUNTER — OFFICE VISIT (OUTPATIENT)
Dept: PHYSICAL MEDICINE AND REHAB | Facility: CLINIC | Age: 60
End: 2025-02-27
Payer: COMMERCIAL

## 2025-02-27 DIAGNOSIS — M25.561 CHRONIC PAIN OF BOTH KNEES: Primary | ICD-10-CM

## 2025-02-27 DIAGNOSIS — M17.10 PRIMARY OSTEOARTHRITIS OF KNEE, UNSPECIFIED LATERALITY: Primary | ICD-10-CM

## 2025-02-27 DIAGNOSIS — M25.562 CHRONIC PAIN OF BOTH KNEES: Primary | ICD-10-CM

## 2025-02-27 DIAGNOSIS — G89.29 CHRONIC PAIN OF BOTH KNEES: Primary | ICD-10-CM

## 2025-02-27 PROCEDURE — 20611 DRAIN/INJ JOINT/BURSA W/US: CPT | Performed by: PHYSICAL MEDICINE & REHABILITATION

## 2025-02-27 RX ORDER — LIDOCAINE HYDROCHLORIDE 10 MG/ML
8 INJECTION, SOLUTION INFILTRATION; PERINEURAL ONCE
Status: COMPLETED | OUTPATIENT
Start: 2025-02-27 | End: 2025-02-27

## 2025-02-27 RX ORDER — TRIAMCINOLONE ACETONIDE 40 MG/ML
80 INJECTION, SUSPENSION INTRA-ARTICULAR; INTRAMUSCULAR ONCE
Status: COMPLETED | OUTPATIENT
Start: 2025-02-27 | End: 2025-02-27

## 2025-02-27 NOTE — TELEPHONE ENCOUNTER
Pt called asking for a referral for Dr Behar Alex    Pt wants to be informed when referral is ready

## 2025-02-27 NOTE — PATIENT INSTRUCTIONS
Instrucciones posteriores a la inyección   No simón nada extenuante khadar los próximos dos días (si recibió dinah inyección en la rodilla, no camine más de 2 wood[place] de la ciudad, no asista a clases de aeróbicos, no corra, no levante objetos pesados, no permanezca de pie por mucho tiempo).  -Puede reanudar aisha actividades cotidianas después de la inyección.  -Es posible que experimente dinah leve hinchazón después del procedimiento.  -Coloque hielo en la articulación que se inyectó al menos 5 a 6 veces al día (15 minutos) khadar dos días después (esto ayudará a prevenir el empeoramiento del dolor que a veces ocurre después de dinah inyección).  -Solo tome tylenol si es necesario para el dolor khadar los primeros días.  -Esté atento a los signos de infección que incluyen enrojecimiento, calor, empeoramiento del dolor, fiebre o escalofríos. Si desarrolla alguno de estos signos, llame a la oficina de inmediato al [phone]  -Todos responden a conrad manera diferente a las inyecciones, mónica puede esperar aisha efectos máximos unas semanas después de conrad última inyección.  Alex B. Behar MD  Physical Medicine and Rehabilitation/Sports Medicine  [facility]

## 2025-02-27 NOTE — PROCEDURES
[facility]   Knee Joint Injection Procedure Note    CHIEF COMPLAINT:    Chief Complaint   Patient presents with    Injection     BILATERAL knee CSI under US       PROCEDURE PERFORMED:  Bilateral knee intra-articular corticosteroid injection under ultrasound guidance    INDICATIONS:  Bilateral knee pain and osteoarthritis    PRIMARY PROCEDURALIST:  Alex Behar, MD    INFORMED CONSENT & TIME OUT:   As documented in the Time Out and Pre-Procedure Check Lists.  Verbal consent was obtained    Vitals: [unfilled]  Labs (document last wbc, plts, hgb, and PT/INR):    No visits with results within 6 Month(s) from this visit.   Latest known visit with results is:   [facility] Lab Encounter on 04/26/2024   Component Date Value Ref Range Status    Cholesterol, Total 04/26/2024 207 (H)  <200 mg/dL Final    HDL Cholesterol 04/26/2024 54  40 - 59 mg/dL Final    Triglycerides 04/26/2024 212 (H)  30 - 149 mg/dL Final    LDL Cholesterol 04/26/2024 116 (H)  <100 mg/dL Final    VLDL 04/26/2024 37 (H)  0 - 30 mg/dL Final    Non HDL Chol 04/26/2024 153 (H)  <130 mg/dL Final    Patient Fasting for Lipid? 04/26/2024 Yes   Final   ]    PROCEDURE:  The procedure, risks, benefits, and alternatives were discussed with the patient.  The patient verbalized understanding and gave verbal consent. The Bilateral knee was prepped and draped in the standard sterile fashion using 3 sticks of Betadine. Using a linear high frequency probe, the suprapatellar recess and bursa was visualized. A 25-gauge 1.5 inch needle with a 5 cc syringe containing 5 cc of 1% lidocaine was introduced through the superolateral approach and was seen entering the the joint capsule to anesthetize the skin and soft tissue. 5 cc of 1% lidocaine was used to anesthetize the skin and soft tissue. Aspiration was attempted with 0 cc of fluid aspirated.  The syringe was switched out and a mixture of 4 cc 1% lidocaine and 1 cc of Kenalog containing  40 mg of corticosteroid was visualized being injected into the intra-articular space. The needle was then removed, hemostasis was obtained, Band-Aid was applied.  Patient tolerated the procedure well.  The patient was able to get up and ambulate.   Patient verbalized understanding of assessment and plan.  Patient is in agreement with the plan.  All questions were answered.  No barriers to learning identified. Permanent pictures were saved.     INSTRUCTIONS GIVEN TO PATIENT:    \"You will see an effect in the next 2-3 days.  Please contact me if you have fevers, worsening swelling, worsening pain, decreased range of motion, increased redness, chills, or anything that makes you concerned about how the joint we injected feels/looks.  If you do not reach me in a reasonable time, please report directly to the emergency room for further evaluation\"      Alex B. Behar MD, [facility]  Physical Medicine and Rehabilitation/Sports Medicine  [facility]

## 2025-02-28 ENCOUNTER — TELEPHONE (OUTPATIENT)
Dept: SURGERY | Facility: CLINIC | Age: 60
End: 2025-02-28

## 2025-02-28 NOTE — TELEPHONE ENCOUNTER
Received pt reminder notice       Per pt reminder note:    \"Follow up in 1 year with a repeat MRA brain without contrast. She can be seen by an DANIEL at [place]\"     Panamanian speaking RN Salina MAGDALENO Attempted to call patient to inform of need for MRA and follow up appointment.     Attempted to call patients daughter Martha, left message on voice mail to call back.    [place]ian letter mailed to patients home address on file.    If patient calls back, staff to remind of need for MRA and follow up appointment please. Order has been placed in chart.

## 2025-03-14 ENCOUNTER — TELEPHONE (OUTPATIENT)
Dept: SURGERY | Facility: CLINIC | Age: 60
End: 2025-03-14

## 2025-03-14 NOTE — TELEPHONE ENCOUNTER
Called patient regarding appointment on 4/11 with Dr. Hickman. Provider will be unavailable this day. Lvm and sent [employer] message for patient to reschedule.

## 2025-03-25 RX ORDER — EZETIMIBE 10 MG/1
10 TABLET ORAL NIGHTLY
Qty: 90 TABLET | Refills: 1 | Status: SHIPPED | OUTPATIENT
Start: 2025-03-25

## 2025-03-28 ENCOUNTER — HOSPITAL ENCOUNTER (OUTPATIENT)
Dept: MRI IMAGING | Age: 60
Discharge: HOME | End: 2025-03-28
Attending: NURSE PRACTITIONER
Payer: COMMERCIAL

## 2025-03-28 DIAGNOSIS — I67.1 BRAIN ANEURYSM (HCC): ICD-10-CM

## 2025-03-28 PROCEDURE — 70544 MR ANGIOGRAPHY HEAD W/O DYE: CPT | Performed by: NURSE PRACTITIONER

## 2025-05-13 ENCOUNTER — OFFICE VISIT (OUTPATIENT)
Dept: PHYSICAL MEDICINE AND REHAB | Facility: CLINIC | Age: 60
End: 2025-05-13
Payer: COMMERCIAL

## 2025-05-13 VITALS
BODY MASS INDEX: 29.07 KG/M2 | HEIGHT: 61 IN | WEIGHT: 154 LBS | SYSTOLIC BLOOD PRESSURE: 110 MMHG | DIASTOLIC BLOOD PRESSURE: 78 MMHG

## 2025-05-13 DIAGNOSIS — M22.2X9 PATELLOFEMORAL PAIN SYNDROME, UNSPECIFIED LATERALITY: ICD-10-CM

## 2025-05-13 DIAGNOSIS — M17.10 PRIMARY OSTEOARTHRITIS OF KNEE, UNSPECIFIED LATERALITY: Primary | ICD-10-CM

## 2025-05-13 DIAGNOSIS — K29.60 NSAID INDUCED GASTRITIS: ICD-10-CM

## 2025-05-13 DIAGNOSIS — T39.395A NSAID INDUCED GASTRITIS: ICD-10-CM

## 2025-05-13 DIAGNOSIS — E78.5 HYPERLIPIDEMIA, UNSPECIFIED HYPERLIPIDEMIA TYPE: ICD-10-CM

## 2025-05-13 PROCEDURE — 99213 OFFICE O/P EST LOW 20 MIN: CPT | Performed by: PHYSICAL MEDICINE & REHABILITATION

## 2025-05-13 PROCEDURE — 3074F SYST BP LT 130 MM HG: CPT | Performed by: PHYSICAL MEDICINE & REHABILITATION

## 2025-05-13 PROCEDURE — 3078F DIAST BP <80 MM HG: CPT | Performed by: PHYSICAL MEDICINE & REHABILITATION

## 2025-05-13 PROCEDURE — 3008F BODY MASS INDEX DOCD: CPT | Performed by: PHYSICAL MEDICINE & REHABILITATION

## 2025-05-13 NOTE — PROGRESS NOTES
The following individual(s) verbally consented to be recorded using ambient AI listening technology and understand that they can each withdraw their consent to this listening technology at any point by asking the clinician to turn off or pause the recording:    Patient name: Elizabeth OSMAN Livier  Additional names:

## 2025-05-13 NOTE — PATIENT INSTRUCTIONS
1) Tylenol 500-1000 mg every 6-8 hours as needed for pain.  No more than 3000 mg daily.  2) Continue home exercise program   3)  Ice 20 minutes at a time 3-4 times per day  4) Follow up with me in about 6 weeks. If symptoms return, then we can repeat the knee injections.

## 2025-05-13 NOTE — PROGRESS NOTES
[facility]  Progress Note    CHIEF COMPLAINT:    Chief Complaint   Patient presents with    Follow - Up     LOV 2/27/25 with BILATERAL knee CSI under US. Pt claims 90% improvement. Pain 0/10. Pt claims very slight weakness in L knee. Pt takes tylenol prn with slight improvement. PT recently helped pt slightly.        History of Present Illness  Elizabeth Maier is a 59 year old female who presents for follow-up after receiving injections for knee pain.    She feels much better after her recent injections, with an estimated improvement of 80-90%.    She experiences occasional weakness in her knee, described as a sudden feeling of weakness while walking.    She takes Tylenol for pain relief, using it up to twice a week, particularly after work when she feels tired.    She also uses ice as part of her management strategy.       PAST MEDICAL HISTORY:  Past Medical History[1]    SURGICAL HISTORY:  Past Surgical History[2]    SOCIAL HISTORY:   Social History     Occupational History    Not on file   Tobacco Use    Smoking status: Never    Smokeless tobacco: Never   Vaping Use    Vaping status: Never Used   Substance and Sexual Activity    Alcohol use: No     Alcohol/week: 0.0 standard drinks of alcohol    Drug use: No    Sexual activity: Not on file       FAMILY HISTORY:   Family History[3]    CURRENT MEDICATIONS:   Current Medications[4]    ALLERGIES:   Allergies[5]    REVIEW OF SYSTEMS:   Review of Systems   Constitutional: Negative.    HENT: Negative.    Eyes: Negative.    Respiratory: Negative.    Cardiovascular: Negative.    Gastrointestinal: Negative.    Genitourinary: Negative.    Musculoskeletal: As per HPI  Skin: Negative.    Neurological: As per HPI  Endo/Heme/Allergies: Negative.    Psychiatric/Behavioral: Negative.      All other systems reviewed and are negative. Pertinent positives and negatives noted in the HPI.    PHYSICAL EXAM:   /78   Ht 61\"   Wt 154  lb (69.9 kg)   BMI 29.10 kg/m²     Body mass index is 29.1 kg/m².      General: No immediate distress  Head: Normocephalic/ Atraumatic  Eyes: Extra-occular movements intact.   Ears: No auricular hematoma or deformities  Mouth: No lesions or ulcerations  Heart: peripheral pulses intact. Normal capillary refill.   Lungs: Non-labored respirations  Abdomen: No abdominal guarding  Extremities: No lower extremity edema bilaterally   Skin: No lesions noted.   Cognition: alert & oriented x 3, attentive, able to follow 2 step commands, comprehension intact, spontaneous speech intact  Motor:    Musculoskeletal:        Data  No visits with results within 6 Month(s) from this visit.   Latest known visit with results is:   [facility] hospital Lab Encounter on 04/26/2024   Component Date Value Ref Range Status    Cholesterol, Total 04/26/2024 207 (H)  <200 mg/dL Final    HDL Cholesterol 04/26/2024 54  40 - 59 mg/dL Final    Triglycerides 04/26/2024 212 (H)  30 - 149 mg/dL Final    LDL Cholesterol 04/26/2024 116 (H)  <100 mg/dL Final    VLDL 04/26/2024 37 (H)  0 - 30 mg/dL Final    Non HDL Chol 04/26/2024 153 (H)  <130 mg/dL Final    Patient Fasting for Lipid? 04/26/2024 Yes   Final   ]      Radiology Imaging:  I reviewed with the patient her X-ray of the knees bilateral   PROCEDURE: XR KNEE COMPLETE BILAT EM     COMPARISON: None.     INDICATIONS: Chronic pain of both knees.  No injury.     TECHNIQUE: 5 views were obtained.       FINDINGS:  BONES: No acute fracture.  No dislocation.  There is tricompartmental left knee osteoarthrosis, which is most advanced and moderate involving the medial compartment.  The osteoarthrosis is demonstrated by joint space narrowing and marginal osteophyte  formation.  There is mild lateral and patellofemoral compartment osteoarthrosis involving the left knee.  There is mild tricompartmental osteoarthrosis of the right knee.  SOFT TISSUES: Negative. No visible soft tissue swelling.  EFFUSION: None visible.  OTHER:  Negative.               Impression   CONCLUSION:     No acute fracture or dislocation.     Tricompartmental osteoarthrosis of the left knee, which is most advanced and moderate involving the medial compartment.     Mild tricompartmental osteoarthrosis of the right knee.             Dictated by (CST): Jose Hugo MD on 7/30/2024 at 9:59 AM      Finalized by (CST): Jose Hugo MD on 7/30/2024 at 10:00 AM             ASSESSMENT AND PLAN:     Assessment & Plan  Primary osteoarthritis of knee  Reported 80-90% improvement post-injections with occasional knee weakness. Overall condition improved.  - Continue exercise regimen.  - Use acetaminophen as needed for pain management.  - Apply ice as needed.  - Schedule follow-up in two months due to insurance change.      RTC in 2 months   Discharge Instructions were provided as documented in AVS summary.  The patient was in agreement with the assessment and plan.  All questions were answered.  There were no barriers to learning.         1. Primary osteoarthritis of knee, unspecified laterality    2. Patellofemoral pain syndrome, unspecified laterality    3. NSAID induced gastritis    4. Hyperlipidemia, unspecified hyperlipidemia type        Alex B. Behar MD  Physical Medicine and Rehabilitation/Sports Medicine  [facility]  [employer] Cures Act Notice to Patient: Medical documents like this are made available to patients in the interest of transparency. However, be advised this is a medical document and it is intended as mpcq-mx-tuee communication between your medical providers. This medical document may contain abbreviations, assessments, medical data, and results or other terms that are unfamiliar. Medical documents are intended to carry relevant information, facts as evident, and the clinical opinion of the practitioner. As such, this medical document may be written in language that appears blunt or direct. You are encouraged to contact your  medical provider and/or [facility] Patient Experience if you have any questions about this medical document.   Abridge tool was used for dictation purposes only and the patient was not recorded at any point during the visit.              [1]   Past Medical History:   Colon adenomas    repeat colonoscopy 1/2021    High cholesterol    Hyperlipidemia    Migraine    LULU (obstructive sleep apnea)    Sleep apnea   [2]   Past Surgical History:  Procedure Laterality Date    Colonoscopy N/A 03/23/2021    Procedure: COLONOSCOPY;  Surgeon: Pranav Henriquez MD;  Location: [facility]    Egd  10/13/2023   [3]   Family History  Problem Relation Age of Onset    Cancer Father    [4]   Current Outpatient Medications   Medication Sig Dispense Refill    ezetimibe 10 MG Oral Tab Take 1 tablet (10 mg total) by mouth nightly. 90 tablet 1    diazePAM 5 MG Oral Tab Take 1 tablet (5 mg total) by mouth as needed. Take 1 tablet 30 minutes prior to your MRI. May repeat 1 time. Do not drive 2 tablet 0    PRAVASTATIN 10 MG Oral Tab TAKE ONE TABLET BY MOUTH EVERY NIGHT AT BEDTIME 90 tablet 2    Neomycin-Polymyxin-HC 3.5-96339-4 Ophthalmic Suspension Place 1 drop into the left eye every 6 (six) hours. 7.5 mL 1    triamcinolone 0.5 % External Cream Apply 1 Application topically 3 (three) times daily. 1 each 1    PANTOPRAZOLE 40 MG Oral Tab EC TAKE ONE TABLET BY MOUTH EVERY DAY IN THE MORNING BEFORE BREAKFAST 90 tablet 1    traMADol 50 MG Oral Tab Take 1 tablet (50 mg total) by mouth 2 (two) times daily as needed for Pain. 60 tablet 0    LORazepam 1 MG Oral Tab Take 1 tablet (1 mg total) by mouth one time. Prior to MRI      alendronate 70 MG Oral Tab Take 1 tablet (70 mg total) by mouth once a week. 4 tablet 0   [5]   Allergies  Allergen Reactions    No Known Allergies MYALGIA

## 2025-05-19 ENCOUNTER — TELEPHONE (OUTPATIENT)
Dept: NEUROLOGY | Facility: CLINIC | Age: 60
End: 2025-05-19

## 2025-05-29 ENCOUNTER — MED REC SCAN ONLY (OUTPATIENT)
Dept: NEUROLOGY | Facility: CLINIC | Age: 60
End: 2025-05-29

## 2025-06-26 RX ORDER — EZETIMIBE 10 MG/1
10 TABLET ORAL NIGHTLY
Qty: 90 TABLET | Refills: 1 | OUTPATIENT
Start: 2025-06-26

## 2025-07-03 ENCOUNTER — TELEPHONE (OUTPATIENT)
Age: 60
End: 2025-07-03

## 2025-07-03 DIAGNOSIS — R90.89 ABNORMAL MRA, BRAIN: ICD-10-CM

## 2025-07-03 DIAGNOSIS — G43.109 MIGRAINE WITH AURA AND WITHOUT STATUS MIGRAINOSUS, NOT INTRACTABLE: Primary | ICD-10-CM

## 2025-07-03 DIAGNOSIS — M25.562 CHRONIC PAIN OF BOTH KNEES: ICD-10-CM

## 2025-07-03 DIAGNOSIS — M25.561 CHRONIC PAIN OF BOTH KNEES: ICD-10-CM

## 2025-07-03 DIAGNOSIS — G89.29 CHRONIC PAIN OF BOTH KNEES: ICD-10-CM

## 2025-07-03 NOTE — TELEPHONE ENCOUNTER
Neurology referral for Dr. Og, Physiatry referral for Dr. Behar, and Neurosurgical referral for Dr. Hickman entered via protocol.     Mychart notification sent to Elizabeth.

## 2025-07-03 NOTE — TELEPHONE ENCOUNTER
Pt called requesting authorized referral for Dr Earle De Los Santos, pt has appt sched 07/10  Dr. Behar Alex, appt sched for 07/15 and for Dr Marylou Matias appt sched 08/11    Please inform pt when referral has been authorized

## 2025-07-10 ENCOUNTER — OFFICE VISIT (OUTPATIENT)
Dept: NEUROLOGY | Facility: CLINIC | Age: 60
End: 2025-07-10

## 2025-07-10 VITALS — HEART RATE: 80 BPM | DIASTOLIC BLOOD PRESSURE: 63 MMHG | SYSTOLIC BLOOD PRESSURE: 119 MMHG

## 2025-07-10 DIAGNOSIS — G43.009 MIGRAINE WITHOUT AURA AND WITHOUT STATUS MIGRAINOSUS, NOT INTRACTABLE: Primary | ICD-10-CM

## 2025-07-10 PROCEDURE — 99214 OFFICE O/P EST MOD 30 MIN: CPT | Performed by: OTHER

## 2025-07-10 PROCEDURE — 3074F SYST BP LT 130 MM HG: CPT | Performed by: OTHER

## 2025-07-10 PROCEDURE — 3078F DIAST BP <80 MM HG: CPT | Performed by: OTHER

## 2025-07-10 RX ORDER — RIMEGEPANT SULFATE 75 MG/75MG
1 TABLET, ORALLY DISINTEGRATING ORAL AS DIRECTED
COMMUNITY
Start: 2025-05-24 | End: 2025-07-10

## 2025-07-10 RX ORDER — UBROGEPANT 100 MG/1
TABLET ORAL
Qty: 10 TABLET | Refills: 11 | Status: SHIPPED | OUTPATIENT
Start: 2025-07-10 | End: 2026-07-10

## 2025-07-15 ENCOUNTER — TELEPHONE (OUTPATIENT)
Dept: NEUROLOGY | Facility: CLINIC | Age: 60
End: 2025-07-15

## 2025-07-15 ENCOUNTER — OFFICE VISIT (OUTPATIENT)
Dept: PHYSICAL MEDICINE AND REHAB | Facility: CLINIC | Age: 60
End: 2025-07-15
Payer: COMMERCIAL

## 2025-07-15 VITALS
HEART RATE: 73 BPM | BODY MASS INDEX: 29.07 KG/M2 | SYSTOLIC BLOOD PRESSURE: 118 MMHG | OXYGEN SATURATION: 97 % | DIASTOLIC BLOOD PRESSURE: 60 MMHG | HEIGHT: 61 IN | WEIGHT: 154 LBS

## 2025-07-15 DIAGNOSIS — M17.10 PRIMARY OSTEOARTHRITIS OF KNEE, UNSPECIFIED LATERALITY: Primary | ICD-10-CM

## 2025-07-15 DIAGNOSIS — M22.2X9 PATELLOFEMORAL PAIN SYNDROME, UNSPECIFIED LATERALITY: ICD-10-CM

## 2025-07-15 DIAGNOSIS — T39.395A NSAID INDUCED GASTRITIS: ICD-10-CM

## 2025-07-15 DIAGNOSIS — K29.60 NSAID INDUCED GASTRITIS: ICD-10-CM

## 2025-07-15 DIAGNOSIS — E78.5 HYPERLIPIDEMIA, UNSPECIFIED HYPERLIPIDEMIA TYPE: ICD-10-CM

## 2025-07-15 PROCEDURE — 99214 OFFICE O/P EST MOD 30 MIN: CPT | Performed by: PHYSICAL MEDICINE & REHABILITATION

## 2025-07-15 PROCEDURE — 3074F SYST BP LT 130 MM HG: CPT | Performed by: PHYSICAL MEDICINE & REHABILITATION

## 2025-07-15 PROCEDURE — 3078F DIAST BP <80 MM HG: CPT | Performed by: PHYSICAL MEDICINE & REHABILITATION

## 2025-07-15 PROCEDURE — 3008F BODY MASS INDEX DOCD: CPT | Performed by: PHYSICAL MEDICINE & REHABILITATION

## 2025-07-15 NOTE — PATIENT INSTRUCTIONS
1) My office will call you to schedule the BILATERAL knee HA and CSI under US once the procedure is approved by your insurance carrier.    2) Continue home exercise program   3)  Ice 20 minutes at a time 3-4 times per day  4) Start tumeric with black pepper 1000 mg twice per day  5) Take Glucosamine with chondroitin 1500/1200 mg daily.   6) Tylenol 500-1000 mg every 6-8 hours as needed for pain.  No more than 3000 mg daily.  7) Use Voltaren gel over the affected area   8) Purchase over the counter Salonpas 4% lidoderm patch maximum strength

## 2025-07-15 NOTE — PROGRESS NOTES
[facility]  Progress Note    CHIEF COMPLAINT:    Chief Complaint   Patient presents with    Follow - Up     LOV 05/13/2025. Pt here for f/u presents with L lateral knee pain. Reports sharp pain. Pain is 9/10. Denies N/T. Admits weakness. Taking tylenol. Reports PT/HEP.       History of Present Illness  Elizabeth Maier is a 60 year old female with osteoarthritis who presents with worsening left knee pain.    She experiences pain on the lateral aspect of her left knee, described as a 'dolencia' and a sensation of 'espinas' or thorns when kneeling. The pain has worsened since her last injection of cortisone in February, which initially provided relief. Currently, she rates the pain as an eight or nine out of ten.    The pain and associated weakness in her knee have significantly impacted her mobility, making it difficult for her to walk comfortably. She is managing the pain with Tylenol and finds ice application helpful. Due to stomach issues, she cannot take other medications.    She is taking turmeric.       PAST MEDICAL HISTORY:  Past Medical History[1]    SURGICAL HISTORY:  Past Surgical History[2]    SOCIAL HISTORY:   Social History     Occupational History    Not on file   Tobacco Use    Smoking status: Never    Smokeless tobacco: Never   Vaping Use    Vaping status: Never Used   Substance and Sexual Activity    Alcohol use: No    Drug use: No    Sexual activity: Not on file       FAMILY HISTORY:   Family History[3]    CURRENT MEDICATIONS:   Current Medications[4]    ALLERGIES:   Allergies[5]    REVIEW OF SYSTEMS:   Review of Systems   Constitutional: Negative.    HENT: Negative.    Eyes: Negative.    Respiratory: Negative.    Cardiovascular: Negative.    Gastrointestinal: Negative.    Genitourinary: Negative.    Musculoskeletal: As per HPI  Skin: Negative.    Neurological: As per HPI  Endo/Heme/Allergies: Negative.    Psychiatric/Behavioral: Negative.      All  other systems reviewed and are negative. Pertinent positives and negatives noted in the HPI.    PHYSICAL EXAM:   /60   Pulse 73   Ht 61\"   Wt 154 lb (69.9 kg)   SpO2 97%   BMI 29.10 kg/m²     Body mass index is 29.1 kg/m².      General: No immediate distress  Head: Normocephalic/ Atraumatic  Eyes: Extra-occular movements intact.   Ears: No auricular hematoma or deformities  Mouth: No lesions or ulcerations  Heart: peripheral pulses intact. Normal capillary refill.   Lungs: Non-labored respirations  Abdomen: No abdominal guarding  Extremities: No lower extremity edema bilaterally   Skin: No lesions noted.   Cognition: alert & oriented x 3, attentive, able to follow 2 step commands, comprehension intact, spontaneous speech intact  Motor:    Musculoskeletal:    Tender to palpation over the bilateral medial and lateral joint line    Data  No visits with results within 6 Month(s) from this visit.   Latest known visit with results is:   [facility] Lab Encounter on 04/26/2024   Component Date Value Ref Range Status    Cholesterol, Total 04/26/2024 207 (H)  <200 mg/dL Final    HDL Cholesterol 04/26/2024 54  40 - 59 mg/dL Final    Triglycerides 04/26/2024 212 (H)  30 - 149 mg/dL Final    LDL Cholesterol 04/26/2024 116 (H)  <100 mg/dL Final    VLDL 04/26/2024 37 (H)  0 - 30 mg/dL Final    Non HDL Chol 04/26/2024 153 (H)  <130 mg/dL Final    Patient Fasting for Lipid? 04/26/2024 Yes   Final   ]      Radiology Imaging:  I reviewed with the patient her X-ray of the knees bilateral   PROCEDURE: XR KNEE COMPLETE BILAT EM     COMPARISON: None.     INDICATIONS: Chronic pain of both knees.  No injury.     TECHNIQUE: 5 views were obtained.       FINDINGS:  BONES: No acute fracture.  No dislocation.  There is tricompartmental left knee osteoarthrosis, which is most advanced and moderate involving the medial compartment.  The osteoarthrosis is demonstrated by joint space narrowing and marginal osteophyte  formation.  There is mild  lateral and patellofemoral compartment osteoarthrosis involving the left knee.  There is mild tricompartmental osteoarthrosis of the right knee.  SOFT TISSUES: Negative. No visible soft tissue swelling.  EFFUSION: None visible.  OTHER: Negative.               Impression   CONCLUSION:     No acute fracture or dislocation.     Tricompartmental osteoarthrosis of the left knee, which is most advanced and moderate involving the medial compartment.     Mild tricompartmental osteoarthrosis of the right knee.             Dictated by (CST): Jose Hugo MD on 7/30/2024 at 9:59 AM      Finalized by (CST): Jose Hugo MD on 7/30/2024 at 10:00 AM             ASSESSMENT AND PLAN:  Elizabeth is a pleasant 60-year-old female who presents for follow-up of her bilateral knee pain due to osteoarthritis and patellofemoral syndrome.  I am recommending bilateral knee hyaluronic acid and corticosteroid injections under ultrasound guidance.  She is to use ice, glucosamine with chondroitin, Tylenol, and Voltaren gel.  She can also use lidocaine patches.  She will follow-up with me for the procedures.     Assessment & Plan  Primary osteoarthritis of knee  Chronic osteoarthritis of the left knee with severe pain and weakness, especially laterally. Imaging showed bone-on-bone contact. Previous cortisone injection.  - Administer cortisone and gel injections to both knees, pending insurance coverage.  - Recommend glucosamine with chondroitin supplement.  - Continue curcumin supplement.  - Use acetaminophen for pain management.  - Apply diclofenac gel or Perengel over the affected area.  - Use lidocaine patches as needed.  - Apply ice regularly.  - Schedule follow-up for injections within two weeks.      RTC in 2 to 4 weeks  Discharge Instructions were provided as documented in AVS summary.  The patient was in agreement with the assessment and plan.  All questions were answered.  There were no barriers to learning.         1. Primary  osteoarthritis of knee, unspecified laterality    2. Patellofemoral pain syndrome, unspecified laterality    3. NSAID induced gastritis    4. Hyperlipidemia, unspecified hyperlipidemia type        Alex B. Behar MD  Physical Medicine and Rehabilitation/Sports Medicine  [address] Cures Act Notice to Patient: Medical documents like this are made available to patients in the interest of transparency. However, be advised this is a medical document and it is intended as slte-sm-ovkm communication between your medical providers. This medical document may contain abbreviations, assessments, medical data, and results or other terms that are unfamiliar. Medical documents are intended to carry relevant information, facts as evident, and the clinical opinion of the practitioner. As such, this medical document may be written in language that appears blunt or direct. You are encouraged to contact your medical provider and/or [facility] Patient Experience if you have any questions about this medical document.   Abridge tool was used for dictation purposes only and the patient was not recorded at any point during the visit.              [1]   Past Medical History:   Colon adenomas    repeat colonoscopy 2021    High cholesterol    Hyperlipidemia    Migraine    Migraines    LULU (obstructive sleep apnea)    Sleep apnea   [2]   Past Surgical History:  Procedure Laterality Date    Colonoscopy N/A 2021    Procedure: COLONOSCOPY;  Surgeon: Pranav Henriquez MD;  Location: [facility] ENDO    Egd  10/13/2023          Tubal ligation     [3]   Family History  Problem Relation Age of Onset    Cancer Father     Stroke Brother    [4]   Current Outpatient Medications   Medication Sig Dispense Refill    ubrogepant (UBRELVY) 100 MG Oral Tab Take one tablet at onset of migraine.  May take additional tablet in 2 hours if needed.  Do not exceed two tablets per 24 hour period.  This is a 30-day supply.  10 tablet 11    ezetimibe 10 MG Oral Tab Take 1 tablet (10 mg total) by mouth nightly. 90 tablet 1    diazePAM 5 MG Oral Tab Take 1 tablet (5 mg total) by mouth as needed. Take 1 tablet 30 minutes prior to your MRI. May repeat 1 time. Do not drive 2 tablet 0    PRAVASTATIN 10 MG Oral Tab TAKE ONE TABLET BY MOUTH EVERY NIGHT AT BEDTIME 90 tablet 2    Neomycin-Polymyxin-HC 3.5-33297-6 Ophthalmic Suspension Place 1 drop into the left eye every 6 (six) hours. (Patient not taking: Reported on 7/10/2025) 7.5 mL 1    triamcinolone 0.5 % External Cream Apply 1 Application topically 3 (three) times daily. 1 each 1    PANTOPRAZOLE 40 MG Oral Tab EC TAKE ONE TABLET BY MOUTH EVERY DAY IN THE MORNING BEFORE BREAKFAST 90 tablet 1    traMADol 50 MG Oral Tab Take 1 tablet (50 mg total) by mouth 2 (two) times daily as needed for Pain. (Patient not taking: Reported on 7/10/2025) 60 tablet 0    LORazepam 1 MG Oral Tab Take 1 tablet (1 mg total) by mouth one time. Prior to MRI (Patient not taking: Reported on 7/10/2025)      alendronate 70 MG Oral Tab Take 1 tablet (70 mg total) by mouth once a week. 4 tablet 0   [5]   Allergies  Allergen Reactions    No Known Allergies MYALGIA

## 2025-07-16 ENCOUNTER — TELEPHONE (OUTPATIENT)
Dept: PHYSICAL MEDICINE AND REHAB | Facility: CLINIC | Age: 60
End: 2025-07-16

## 2025-07-16 NOTE — TELEPHONE ENCOUNTER
Initiated authorization for BILATERAL knee HA and CSI under . CPT/HCPCS 82392-81,  x's 2,  x's 2 dx:M17.0  with Precious with [employer].    Status: No auth required  Reference/Authorization # [id number]      Authorization is not required based on medical necessity, however, is not a guarantee of payment and may be subject to review once claim is submitted.     PLEASE INFORM THE PATIENT TO CONTACT THE OFFICE IF THE INSURANCE CHANGES AS HE/SHE IS RESPONSIBLE TO UPDATE THE OFFICE IF INSURANCE CHANGES SO THAT PROCEDURE IS BILLED CORRECTLY.

## 2025-07-18 ENCOUNTER — OFFICE VISIT (OUTPATIENT)
Dept: PHYSICAL MEDICINE AND REHAB | Facility: CLINIC | Age: 60
End: 2025-07-18
Payer: COMMERCIAL

## 2025-07-18 DIAGNOSIS — M17.10 PRIMARY OSTEOARTHRITIS OF KNEE, UNSPECIFIED LATERALITY: Primary | ICD-10-CM

## 2025-07-18 DIAGNOSIS — M22.2X9 PATELLOFEMORAL PAIN SYNDROME, UNSPECIFIED LATERALITY: ICD-10-CM

## 2025-07-18 RX ORDER — TRIAMCINOLONE ACETONIDE 40 MG/ML
80 INJECTION, SUSPENSION INTRA-ARTICULAR; INTRAMUSCULAR ONCE
Status: COMPLETED | OUTPATIENT
Start: 2025-07-18 | End: 2025-07-18

## 2025-07-18 RX ORDER — LIDOCAINE HYDROCHLORIDE 10 MG/ML
14 INJECTION, SOLUTION INFILTRATION; PERINEURAL ONCE
Status: COMPLETED | OUTPATIENT
Start: 2025-07-18 | End: 2025-07-18

## 2025-07-18 NOTE — PROCEDURES
[facility]   Knee Joint Injection Procedure Note    CHIEF COMPLAINT:    Chief Complaint   Patient presents with    Injection     BILATERAL knee HA and CSI under US         PROCEDURE PERFORMED:  BILATERAL knee intra-articular Durolane and corticosteroid injection under ultrasound guidance    INDICATIONS:  BILATERAL knee pain and osteoarthritis    PRIMARY PROCEDURALIST:  Alex Behar, MD    INFORMED CONSENT & TIME OUT:   As documented in the Time Out and Pre-Procedure Check Lists.  Verbal consent was obtained    Vitals: [unfilled]  Labs (document last wbc, plts, hgb, and PT/INR):    No visits with results within 6 Month(s) from this visit.   Latest known visit with results is:   [facility] Lab Encounter on 04/26/2024   Component Date Value Ref Range Status    Cholesterol, Total 04/26/2024 207 (H)  <200 mg/dL Final    HDL Cholesterol 04/26/2024 54  40 - 59 mg/dL Final    Triglycerides 04/26/2024 212 (H)  30 - 149 mg/dL Final    LDL Cholesterol 04/26/2024 116 (H)  <100 mg/dL Final    VLDL 04/26/2024 37 (H)  0 - 30 mg/dL Final    Non HDL Chol 04/26/2024 153 (H)  <130 mg/dL Final    Patient Fasting for Lipid? 04/26/2024 Yes   Final   ]    PROCEDURE:    LEFT knee:    PROCEDURE:  The procedure, risks, benefits and alternatives were discussed with the patient.  Patient verbalized understanding and gave consent.  The LEFT knee was prepped and draped in the usual sterile fashion. Using a linear ultrasound probe, the superolateral patella-femoral joint space was visualized. Using a 30-gauge, 1/2-inch needle, 1 cc of 1% lidocaine was used to numb the skin and soft tissues in the superolateral approach.  The intra-articular space was then accessed using an 18-gauge, 1-1/2-inch needle, which was visualized on ultrasound, using approximately 5 cc of 1% lidocaine to numb the soft tissue.  Once the intra-articular space was visualized on ultrasound, with the needle accessing the space, the syringe  was traded for an empty 10 cc syringe and aspiration was attempted. 1 cc of serous straw colored fluid was removed from the LEFT knee. The Durolane  injection was attached to the needle and injected. During the injection, the Durolane was noted to enter the intra-articular space. The Durolane syringe was then switched out for a syringe containing 2 cc of 1% lidocaine and 1 cc of 40 mg/cc triamcinolone which was injected into the same space. The needle was then removed and hemostasis was achieved.  Skin was cleansed and a dry dressing was placed.    Patient tolerated the procedure well and no immediate complications noted.     RIGHT knee:    PROCEDURE:  The procedure, risks, benefits and alternatives were discussed with the patient.  Patient verbalized understanding and gave consent.  The RIGHT knee was prepped and draped in the usual sterile fashion. Using a linear ultrasound probe, the superolateral patella-femoral joint space was visualized. Using a 30-gauge, 1/2-inch needle, 1 cc of 1% lidocaine was used to numb the skin and soft tissues in the superolateral approach.  The intra-articular space was then accessed using an 18-gauge, 1-1/2-inch needle, which was visualized on ultrasound, using approximately 5 cc of 1% lidocaine to numb the soft tissue.  Once the intra-articular space was visualized on ultrasound, with the needle accessing the space, the syringe was traded for an empty 10 cc syringe and aspiration was attempted. 0 cc of serous straw colored fluid was removed from the RIGHT knee. The Durolane injection was attached to the needle and injected. During the injection, the Durolane was noted to enter the intra-articular space. The Durolane syringe was then switched out for a syringe containing 2 cc of 1% lidocaine and 1 cc of 40 mg/cc triamcinolone which was injected into the same space. The needle was then removed and hemostasis was achieved.  Skin was cleansed and a dry dressing was placed.      Patient  tolerated the procedure well and no immediate complications noted.       Patient verbalized understanding of assessment and plan.  Patient is in agreement with the plan.  All questions were answered.  No barriers to learning identified. Permanent pictures were saved in our PACS systeme.       INSTRUCTIONS GIVEN TO PATIENT:    \"You will see an effect in the next 2-3 days.  Please contact me if you have fevers, worsening swelling, worsening pain, decreased range of motion, increased redness, chills, or anything that makes you concerned about how the joint we injected feels/looks.  If you do not reach me in a reasonable time, please report directly to the emergency room for further evaluation\"    2 months    Alex B. Behar MD, [facility]  Physical Medicine and Rehabilitation/Sports Medicine  [facility]

## 2025-07-18 NOTE — PATIENT INSTRUCTIONS
Post Injection Instructions     Please do not do anything strenuous over the next two days (if you had a knee injection do not walk more than 2 city blocks, do not attend any aerobic classes, do not run, no heavy lifting, no prolong standing).  You may resume your day to day activities after your injection.  You may experience some mild amount of swelling after the procedure.  Please ice your joint that was injected at least 5-6 times a day (15 minutes) for two days after (this will help prevent worsening pain that sometimes occurs after an injection).  Only take tylenol if needed for pain for the first few days.  Watch for signs of infection which include redness, warmth, worsening pain, fevers or chills.  If you develop any of these signs call the office immediately at [phone]    Everyone responds differently to injections, but you can expect your peak effects a few weeks after your last injection.  Alex B. Behar MD  Physical Medicine and Rehabilitation/Sports Medicine  [facility]

## 2025-07-28 ENCOUNTER — TELEPHONE (OUTPATIENT)
Age: 60
End: 2025-07-28

## 2025-07-28 NOTE — TELEPHONE ENCOUNTER
Pt called asking why her referrals for neurology, physiatry and neurosurgery have not been approved  Pt has ap upcoming appt for Aug 11 with Neuro    Please advise

## 2025-07-29 RX ORDER — PANTOPRAZOLE SODIUM 40 MG/1
40 TABLET, DELAYED RELEASE ORAL
Qty: 90 TABLET | Refills: 1 | Status: SHIPPED | OUTPATIENT
Start: 2025-07-29

## 2025-08-26 RX ORDER — PRAVASTATIN SODIUM 10 MG
10 TABLET ORAL NIGHTLY
Qty: 90 TABLET | Refills: 2 | OUTPATIENT
Start: 2025-08-26

## (undated) DIAGNOSIS — G43.109 MIGRAINE WITH AURA AND WITHOUT STATUS MIGRAINOSUS, NOT INTRACTABLE: ICD-10-CM

## (undated) DIAGNOSIS — E78.2 MIXED HYPERLIPIDEMIA: ICD-10-CM

## (undated) DEVICE — TRAP MCS 40ML 5IN PLS SCR CAP

## (undated) DEVICE — KIT ENDO ORCAPOD 160/180/190

## (undated) DEVICE — LINE MNTR ADLT SET O2 INTMD

## (undated) DEVICE — KIT CLEAN ENDOKIT 1.1OZ GOWNX2

## (undated) DEVICE — 6 ML SYRINGE LUER-LOCK TIP: Brand: MONOJECT

## (undated) DEVICE — MEDI-VAC NON-CONDUCTIVE SUCTION TUBING 6MM X 1.8M (6FT.) L: Brand: CARDINAL HEALTH

## (undated) DEVICE — FORCEP RADIAL JAW 4

## (undated) DEVICE — Device: Brand: DEFENDO AIR/WATER/SUCTION AND BIOPSY VALVE

## (undated) DEVICE — SNARE CAPTIFLEX MICRO-OVL OLY

## (undated) DEVICE — 3 ML SYRINGE LUER-LOCK TIP: Brand: MONOJECT

## (undated) DEVICE — 60 ML SYRINGE REGULAR TIP: Brand: MONOJECT

## (undated) DEVICE — Device: Brand: DUAL NARE NASAL CANNULAE FEMALE LUER CON 7FT O2 TUBE

## (undated) DEVICE — CONMED SCOPE SAVER BITE BLOCK, 20X27 MM: Brand: SCOPE SAVER

## (undated) DEVICE — Device: Brand: CUSTOM PROCEDURE KIT

## (undated) DEVICE — FORCEPS BX L240CM DIA2.4MM L NDL RAD JAW 4

## (undated) DEVICE — 35 ML SYRINGE REGULAR TIP: Brand: MONOJECT

## (undated) NOTE — MR AVS SNAPSHOT
[address] at [address] [id number] [phone]               Thank you for choosing us for your health care visit with [place] [address] NURSE.   We are glad to serve you and happy to provide you with this summ information, go to [email or web address] and click on the Sign Up Now link in the [employer] box. Enter your [employer] Activation Code exactly as it appears below along with your Zip Code and Date of Birth to complete the sign-up process.  If you do

## (undated) NOTE — LETTER
25    Patient: Elizabeth miles  : 6.14.1965    Estimado paciente Elizabeth,    Porque conrad justine es importante para nosotros, nuestra oficina de [facility] ha intentado comunicarse con usted para recordarle que estas atrasado para tu seguimiento en el consultorio del Dr. Hickman.     También necesita un MRA del cerebro para monitoriar conrad aneurisma cerebral. Por favor llame a el centro de programación al brian [phone] para programar esta jarett.    Hemos intentado comunicarnos con usted utilizando los números que proporcionó anteriormente sin éxito.    Por favorLlame a nuestra oficina al [phone] opción número 2 para programar dinah jarett con el Dr. Hickman ya despues de obtener cornad MRA.    Atentamente,    Personal de KERRIE

## (undated) NOTE — LETTER
AUTHORIZATION FOR SURGICAL OPERATION OR OTHER PROCEDURE    1. I hereby authorize Dr. Alex Behar and the [facility] staff assigned to my case to perform the following operation and/or procedure at the [facility]:    BILATERAL knee HA and CSI under US    2.  My physician has explained the nature and purpose of the operation or other procedure, possible alternative methods of treatment, the risks involved, and the possibility of complication to me.  I acknowledge that no guarantee has been made as to the result that may be obtained.  3.  I recognize that, during the course of this operation, or other procedure, unforseen conditions may necessitate additional or different procedure than those listed above.  I, therefore, further authorize and request that the above named physician, his/her physician assistants or designees perform such procedures as are, in his/her professional opinion, necessary and desirable.  4.  Any tissue or organs removed in the operation or other procedure may be disposed of by and at the discretion of the [facility] staff and [facility].  5.  I understand that in the event of a medical emergency, I will be transported by local paramedics to [facility] or other hospital emergency department.  6.  I certify that I have read and fully understand the above consent to operation and/or other procedure.    7.  I acknowledge that my physician has explained sedation/analgesia administration to me including the risks and benefits.  I consent to the administration of sedation/analgesia as may be necessary or desirable in the judgement of my physician.    Witness signature: ___________________________________________________ Date:  ______/______/_____                    Time:  ________ A.M.  P.M.       Patient Name:  Elizabeth Maier  6/14/1965  [id number]         Patient signature:  ___________________________________________________                 Statement of  Physician  My signature below affirms that prior to the time of the procedure, I have explained to the patient and/or his/her guardian, the risks and benefits involved in the proposed treatment and any reasonable alternative to the proposed treatment.  I have also explained the risks and benefits involved in the refusal of the proposed treatment and have answered the patient's questions.                        Date:  ______/______/_______  Provider                      Signature:  __________________________________________________________       Time:  ___________ A.M    P.M.

## (undated) NOTE — LETTER
Date: 2024      Patient Name: Elizabeth Maier      : 1965        Thank you for choosing [facility] as your health care provider. Your physician has deemed the following medical service(s) necessary. However, your insurance plan may not pay for all of your health care and costs and may deny payment for this service. The fact that your insurance plan does not pay for an item or service does not mean you should not receive it. The purpose of this form is to help you make an informed decision about whether or not you want to receive this service(s) that may not be paid for by your insurance plan.    CPT Code Description     Cost     BILATERAL knee HA and CSI under US       I understand that the above mentioned service(s) or supply may not be covered by my insurance company. I agree to be financially responsible for the cost of this service or supply in the event of my insurance denies payment as a non-covered benefit.        ______________________________________________________________________  Signature of Patient or Patient's Representative  Relationship  Date    ______________________________________________________________________  Signature of Witness to signing of form   Printed Name

## (undated) NOTE — LETTER
Date: 2025      Patient Name: Elizabeth Maier      : 1965        Thank you for choosing [facility] as your health care provider. Your physician has deemed the following medical service(s) necessary. However, your insurance plan may not pay for all of your health care and costs and may deny payment for this service. The fact that your insurance plan does not pay for an item or service does not mean you should not receive it. The purpose of this form is to help you make an informed decision about whether or not you want to receive this service(s) that may not be paid for by your insurance plan.    CPT Code Description     Cost     BILATERAL knee HA and CSI under US      I understand that the above mentioned service(s) or supply may not be covered by my insurance company. I agree to be financially responsible for the cost of this service or supply in the event of my insurance denies payment as a non-covered benefit.        ______________________________________________________________________  Signature of Patient or Patient's Representative  Relationship  Date    ______________________________________________________________________  Signature of Witness to signing of form   Printed Name

## (undated) NOTE — MR AVS SNAPSHOT
[address] at [address] [phone]               Thank you for choosing us for your health care visit with Chai Bueno DO.   We are glad to serve you and happy to provide you with this conrad Migraine. Use at onset; repeat once after 2 HRS-ONLY 2 IN 24 HR MAX   Commonly known as:  IMITREX                Where to Get Your Medications      These medications were sent to [facility]/PHARMACY #[id number]- Flore[address] Mercado - [address] [facility] [address]    [address]  [address] [place] [address]    It is the patient's responsibility to check with and follow their insurance company's guidelines for prior authorization for this test.  You ma Your unique [employer] Access Code: [email or web address]  Expires: 4/14/2017  5:53 PM    If you have questions, you can call [phone] to talk to our [facility] Staff. Remember, [employer] is NOT to be used for urgent needs. For medical emergencies, dial 911.

## (undated) NOTE — LETTER
[address]  [address] [address], [place]  Authorization for Surgical Operation and Procedure                                                                                           I hereby authorize Germán Parrish MD, my physician and his/her assistants (if applicable), which may include medical students, residents, and/or fellows, to perform the following surgical operation/ procedure and administer such anesthesia as may be determined necessary by my physician: Operation/Procedure name (s) ESOPHAGOGASTRODUODENOSCOPY on [address]   2. I recognize that during the surgical operation/procedure, unforeseen conditions may necessitate additional or different procedures than those listed above. I, therefore, further authorize and request that the above-named surgeon, assistants, or designees perform such procedures as are, in their judgment, necessary and desirable. 3.   My surgeon/physician has discussed prior to my surgery the potential benefits, risks and side effects of this procedure; the likelihood of achieving goals; and potential problems that might occur during recuperation. They also discussed reasonable alternatives to the procedure, including risks, benefits, and side effects related to the alternatives and risks related to not receiving this procedure. I have had all my questions answered and I acknowledge that no guarantee has been made as to the result that may be obtained. 4.   Should the need arise during my operation/procedure, which includes change of level of care prior to discharge, I also consent to the administration of blood and/or blood products. Further, I understand that despite careful testing and screening of blood or blood products by collecting agencies, I may still be subject to ill effects as a result of receiving a blood transfusion and/or blood products.   The following are some, but not all, of the potential risks that can occur: fever and allergic reactions, hemolytic reactions, transmission of diseases such as Hepatitis, AIDS and Cytomegalovirus (CMV) and fluid overload. In the event that I wish to have an autologous transfusion of my own blood, or a directed donor transfusion, I will discuss this with my physician. Check only if Refusing Blood or Blood Products  I understand refusal of blood or blood products as deemed necessary by my physician may have serious consequences to my condition to include possible death. I hereby assume responsibility for my refusal and release the hospital, its personnel, and my physicians from any responsibility for the consequences of my refusal.    o  Refuse   5. I authorize the use of any specimen, organs, tissues, body parts or foreign objects that may be removed from my body during the operation/procedure for diagnosis, research or teaching purposes and their subsequent disposal by hospital authorities. I also authorize the release of specimen test results and/or written reports to my treating physician on the hospital medical staff or other referring or consulting physicians involved in my care, at the discretion of the Pathologist or my treating physician. 6.   I consent to the photographing or videotaping of the operations or procedures to be performed, including appropriate portions of my body for medical, scientific, or educational purposes, provided my identity is not revealed by the pictures or by descriptive texts accompanying them. If the procedure has been photographed/videotaped, the surgeon will obtain the original picture, image, videotape or CD. The hospital will not be responsible for storage, release or maintenance of the picture, image, tape or CD.    7.   I consent to the presence of a  or observers in the operating room as deemed necessary by my physician or their designees.     8.   I recognize that in the event my procedure results in extended X-Ray/fluoroscopy time, I may develop a skin reaction. 9. If I have a Do Not Attempt Resuscitation (DNAR) order in place, that status will be suspended while in the operating room, procedural suite, and during the recovery period unless otherwise explicitly stated by me (or a person authorized to consent on my behalf). The surgeon or my attending physician will determine when the applicable recovery period ends for purposes of reinstating the DNAR order. 10. Patients having a sterilization procedure: I understand that if the procedure is successful the results will be permanent and it will therefore be impossible for me to inseminate, conceive, or bear children. I also understand that the procedure is intended to result in sterility, although the result has not been guaranteed. 11. I acknowledge that my physician has explained sedation/analgesia administration to me including the risk and benefits I consent to the administration of sedation/analgesia as may be necessary or desirable in the judgment of my physician. I CERTIFY THAT I HAVE READ AND FULLY UNDERSTAND THE ABOVE CONSENT TO OPERATION and/or OTHER PROCEDURE.     _________________________________________ _________________________________     ___________________________________  Signature of Patient     Signature of Responsible Person                   Printed Name of Responsible Person                              _________________________________________ ______________________________        ___________________________________  Signature of Witness         Date  Time         Relationship to Patient    STATEMENT OF PHYSICIAN My signature below affirms that prior to the time of the procedure; I have explained to the patient and/or his/her legal representative, the risks and benefits involved in the proposed treatment and any reasonable alternative to the proposed treatment.  I have also explained the risks and benefits involved in refusal of the proposed treatment and alternatives to the proposed treatment and have answered the patient's questions.  If I have a significant financial interest in a co-management agreement or a significant financial interest in any product or implant, or other significant relationship used in this procedure/surgery, I have disclosed this and had a discussion with my patient.     _______________________________________________________________ _____________________________  Yris Paredes Physician)                                                                                         (Date)                                   (Time)  Patient Name: Tony Hannah    : 1965   Printed: 10/12/2023      Medical Record #: [id number]                                              Page 1 of 1

## (undated) NOTE — LETTER
11/22/2019              Eilzabeth Maier        [address] [facility] [address]         ABOVE PATIENT SEEN/EXAMINED TODAY POST FALL AND RIGHT BUTTOCK SPRAIN 10 DAYS AGO. SHE IS IMPROVING BUT NOT FULLY HEALED.   REMAIN ON \"LIGHT DUTY\" X O

## (undated) NOTE — LETTER
[facility]  Administration of Anesthesia  1. Saad Medina, or _________________________________ acting on her behalf, (Patient) (Dependent/Representative) request to receive anesthesia for my pending procedure/operation/treatment.   A bleeding, seizure, cardiac arrest and death. 7. AWARENESS: I understand that it is possible (but unlikely) to have explicit memory of events from the operating room while under general anesthesia.   8. ELECTROCONVULSIVE THERAPY PATIENTS: This consent serve below affirms that prior to the time of the procedure, I have explained to the patient and/or his/her guardian, the risks and benefits of undergoing anesthesia, as well as any reasonable alternatives.     ___________________________________________________

## (undated) NOTE — LETTER
12/3/2020    Elizabeth Maier        [address] [address]            Dear Estela Finely,      Our records indicate that you are due for an appointment for a Colonoscopy in January 2021, or shortly there after, with Vinny Flannery,

## (undated) NOTE — LETTER
AUTHORIZATION FOR SURGICAL OPERATION OR OTHER PROCEDURE    1. I hereby authorize Dr. Alex Behar and the [facility] staff assigned to my case to perform the following operation and/or procedure at the [facility]:    BILATERAL knee HA and CSI under US     2.  My physician has explained the nature and purpose of the operation or other procedure, possible alternative methods of treatment, the risks involved, and the possibility of complication to me.  I acknowledge that no guarantee has been made as to the result that may be obtained.  3.  I recognize that, during the course of this operation, or other procedure, unforseen conditions may necessitate additional or different procedure than those listed above.  I, therefore, further authorize and request that the above named physician, his/her physician assistants or designees perform such procedures as are, in his/her professional opinion, necessary and desirable.  4.  Any tissue or organs removed in the operation or other procedure may be disposed of by and at the discretion of the [facility] staff and [facility].  5.  I understand that in the event of a medical emergency, I will be transported by local paramedics to [facility] or other hospital emergency department.  6.  I certify that I have read and fully understand the above consent to operation and/or other procedure.    7.  I acknowledge that my physician has explained sedation/analgesia administration to me including the risks and benefits.  I consent to the administration of sedation/analgesia as may be necessary or desirable in the judgement of my physician.    Witness signature: ___________________________________________________ Date:  ______/______/_____                    Time:  ________ A.M.  P.M.       Patient Name:  Elizabeth Maier  6/14/1965  [id number]         Patient signature:  ___________________________________________________               Statement of Physician  My  signature below affirms that prior to the time of the procedure, I have explained to the patient and/or his/her guardian, the risks and benefits involved in the proposed treatment and any reasonable alternative to the proposed treatment.  I have also explained the risks and benefits involved in the refusal of the proposed treatment and have answered the patient's questions.                        Date:  ______/______/_______  Provider                      Signature:  __________________________________________________________       Time:  ___________ A.M    P.M.

## (undated) NOTE — LETTER
AUTHORIZATION FOR SURGICAL OPERATION OR OTHER PROCEDURE    1. I hereby authorize Dr. Alex Behar and the [facility] staff assigned to my case to perform the following operation and/or procedure at the [facility]:    BILATERAL knee CSI under US     2.  My physician has explained the nature and purpose of the operation or other procedure, possible alternative methods of treatment, the risks involved, and the possibility of complication to me.  I acknowledge that no guarantee has been made as to the result that may be obtained.  3.  I recognize that, during the course of this operation, or other procedure, unforseen conditions may necessitate additional or different procedure than those listed above.  I, therefore, further authorize and request that the above named physician, his/her physician assistants or designees perform such procedures as are, in his/her professional opinion, necessary and desirable.  4.  Any tissue or organs removed in the operation or other procedure may be disposed of by and at the discretion of the [facility] staff and [facility].  5.  I understand that in the event of a medical emergency, I will be transported by local paramedics to [facility] or other hospital emergency department.  6.  I certify that I have read and fully understand the above consent to operation and/or other procedure.    7.  I acknowledge that my physician has explained sedation/analgesia administration to me including the risks and benefits.  I consent to the administration of sedation/analgesia as may be necessary or desirable in the judgement of my physician.    Witness signature: ___________________________________________________ Date:  ______/______/_____                    Time:  ________ A.M.  P.M.       Patient Name:  Elizabeth Maier  6/14/1965  [id number]       Patient signature:  ___________________________________________________  Statement of Physician  My signature below  affirms that prior to the time of the procedure, I have explained to the patient and/or his/her guardian, the risks and benefits involved in the proposed treatment and any reasonable alternative to the proposed treatment.  I have also explained the risks and benefits involved in the refusal of the proposed treatment and have answered the patient's questions.                        Date:  ______/______/_______  Provider                      Signature:  __________________________________________________________       Time:  ___________ ASudhaM    P.M.

## (undated) NOTE — LETTER
Date: 2025      Patient Name: Elizabeth Maier      : 1965        Thank you for choosing [facility] as your health care provider. Your physician has deemed the following medical service(s) necessary. However, your insurance plan may not pay for all of your health care and costs and may deny payment for this service. The fact that your insurance plan does not pay for an item or service does not mean you should not receive it. The purpose of this form is to help you make an informed decision about whether or not you want to receive this service(s) that may not be paid for by your insurance plan.    CPT Code Description     Cost     BILATERAL knee CSI under US       _________ ______________________________ _____________      _________ ______________________________ _____________      I understand that the above mentioned service(s) or supply may not be covered by my insurance company. I agree to be financially responsible for the cost of this service or supply in the event of my insurance denies payment as a non-covered benefit.        ______________________________________________________________________  Signature of Patient or Patient's Representative  Relationship  Date    ______________________________________________________________________  Signature of Witness to signing of form   Printed Name

## (undated) NOTE — LETTER
[facility], [place]  Authorization for Invasive Procedures  1.  I hereby authorize Dr. Balwinder Raymundo** , my physician and whomever may be designated as the doctor's assistant, to perform the following operation and/or procedure:  *CO and allergic reactions, hemolytic reactions, transmission of disease such as hepatitis, AIDS, cytomegalovirus (CMV), and flluid overload.  In the event that I wish to have autologous transfusions of my own blood, or a directed donor transfusion, I will disc Signature of Patient:  ________________________________________________ Date: _________Time: _________    Responsible person in case of minor or unconscious: _____________________________Relationship: ____________     Witness Signature: ___________________

## (undated) NOTE — MR AVS SNAPSHOT
[address] [facility] [address] [facility] [place] [facility] [id number] [phone]               Thank you for choosing us for your health care visit with Suhail Sepulveda DO.   We are glad to serve you and happy to provide you with this conrad Reason for Today's Visit     Ear Pain           Medical Issues Discussed Today     TMJ dysfunction    -  Primary      Instructions and Information about Your Health      Si usted tiene trastorno temporomandibular (TTM)  La articulación temporomandibular (A dolor en la lily o en los dientes puede provenir de un problema de la SUSI. El dolor miofascial afecta a tejidos blandos, deirdre los músculos. Los puntos de estimulación de estas áreas de dolor suelen causar dolor referido.  Es posible que sienta dolor en la m · Maloclusión (desalineación de los dientes o la mordida). Brown proveedor de [facility] [employer] dará más información sobre estos problemas, si se hace necesario.    Date Last Reviewed: 7/13/2015  © 5219-5269 The [facility], Enter your [employer] Activation Code exactly as it appears below along with your Zip Code and Date of Birth to complete the sign-up process. If you do not sign up before the expiration date, you must request a new code.     Your unique [employer] Access Code: [email or web address]